# Patient Record
Sex: FEMALE | Race: ASIAN
[De-identification: names, ages, dates, MRNs, and addresses within clinical notes are randomized per-mention and may not be internally consistent; named-entity substitution may affect disease eponyms.]

---

## 2017-05-03 ENCOUNTER — HOSPITAL ENCOUNTER (EMERGENCY)
Dept: HOSPITAL 8 - ED | Age: 47
Discharge: HOME | End: 2017-05-03
Payer: MEDICAID

## 2017-05-03 VITALS — BODY MASS INDEX: 24.01 KG/M2 | HEIGHT: 71 IN | WEIGHT: 171.52 LBS

## 2017-05-03 VITALS — SYSTOLIC BLOOD PRESSURE: 139 MMHG | DIASTOLIC BLOOD PRESSURE: 93 MMHG

## 2017-05-03 DIAGNOSIS — E03.9: ICD-10-CM

## 2017-05-03 DIAGNOSIS — E78.00: ICD-10-CM

## 2017-05-03 DIAGNOSIS — J45.909: ICD-10-CM

## 2017-05-03 DIAGNOSIS — M79.601: Primary | ICD-10-CM

## 2017-05-03 DIAGNOSIS — G89.11: ICD-10-CM

## 2017-05-03 DIAGNOSIS — E11.9: ICD-10-CM

## 2017-05-03 DIAGNOSIS — I10: ICD-10-CM

## 2017-05-03 PROCEDURE — 96372 THER/PROPH/DIAG INJ SC/IM: CPT

## 2017-05-03 PROCEDURE — 99284 EMERGENCY DEPT VISIT MOD MDM: CPT

## 2017-05-03 PROCEDURE — 73060 X-RAY EXAM OF HUMERUS: CPT

## 2017-05-03 PROCEDURE — 73030 X-RAY EXAM OF SHOULDER: CPT

## 2017-09-05 ENCOUNTER — HOSPITAL ENCOUNTER (EMERGENCY)
Dept: HOSPITAL 8 - ED | Age: 47
Discharge: LEFT BEFORE DISCHARGE | End: 2017-09-05
Payer: MEDICAID

## 2017-09-05 DIAGNOSIS — R51: Primary | ICD-10-CM

## 2017-09-05 DIAGNOSIS — R04.0: ICD-10-CM

## 2017-09-05 DIAGNOSIS — Z53.21: ICD-10-CM

## 2017-10-13 ENCOUNTER — HOSPITAL ENCOUNTER (EMERGENCY)
Dept: HOSPITAL 8 - ED | Age: 47
Discharge: HOME | End: 2017-10-13
Payer: MEDICAID

## 2017-10-13 VITALS — HEIGHT: 61 IN | BODY MASS INDEX: 31.3 KG/M2 | WEIGHT: 165.79 LBS

## 2017-10-13 VITALS — SYSTOLIC BLOOD PRESSURE: 162 MMHG | DIASTOLIC BLOOD PRESSURE: 113 MMHG

## 2017-10-13 DIAGNOSIS — M25.551: ICD-10-CM

## 2017-10-13 DIAGNOSIS — M25.572: ICD-10-CM

## 2017-10-13 DIAGNOSIS — M25.552: ICD-10-CM
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
## 2017-10-13 DIAGNOSIS — I10: ICD-10-CM

## 2017-10-13 DIAGNOSIS — M25.562: ICD-10-CM

## 2017-10-13 DIAGNOSIS — S39.012A: Primary | ICD-10-CM

## 2017-10-13 DIAGNOSIS — E03.9: ICD-10-CM

## 2017-10-13 DIAGNOSIS — E78.00: ICD-10-CM

## 2017-10-13 DIAGNOSIS — M25.571: ICD-10-CM
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
## 2017-10-13 DIAGNOSIS — M25.561: ICD-10-CM
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
## 2017-10-13 DIAGNOSIS — E11.9: ICD-10-CM

## 2017-10-13 LAB
BUN SERPL-MCNC: 16 MG/DL (ref 7–18)
HCT VFR BLD CALC: 44.3 % (ref 34.6–47.8)
HGB BLD-MCNC: 15.1 G/DL (ref 11.7–16.4)
WBC # BLD AUTO: 7.1 X10^3/UL (ref 3.4–10)

## 2017-10-13 PROCEDURE — 82040 ASSAY OF SERUM ALBUMIN: CPT

## 2017-10-13 PROCEDURE — 80048 BASIC METABOLIC PNL TOTAL CA: CPT

## 2017-10-13 PROCEDURE — 96372 THER/PROPH/DIAG INJ SC/IM: CPT

## 2017-10-13 PROCEDURE — 85025 COMPLETE CBC W/AUTO DIFF WBC: CPT

## 2017-10-13 PROCEDURE — 99284 EMERGENCY DEPT VISIT MOD MDM: CPT

## 2017-10-13 PROCEDURE — 36415 COLL VENOUS BLD VENIPUNCTURE: CPT

## 2018-01-09 ENCOUNTER — HOSPITAL ENCOUNTER (EMERGENCY)
Dept: HOSPITAL 8 - ED | Age: 48
Discharge: HOME | End: 2018-01-09
Payer: MEDICAID

## 2018-01-09 VITALS — WEIGHT: 163.34 LBS | BODY MASS INDEX: 30.84 KG/M2 | HEIGHT: 61 IN

## 2018-01-09 VITALS — DIASTOLIC BLOOD PRESSURE: 101 MMHG | SYSTOLIC BLOOD PRESSURE: 143 MMHG

## 2018-01-09 DIAGNOSIS — E03.9: ICD-10-CM

## 2018-01-09 DIAGNOSIS — I10: ICD-10-CM

## 2018-01-09 DIAGNOSIS — E11.65: ICD-10-CM

## 2018-01-09 DIAGNOSIS — R07.89: Primary | ICD-10-CM

## 2018-01-09 DIAGNOSIS — J45.909: ICD-10-CM

## 2018-01-09 DIAGNOSIS — E87.6: ICD-10-CM

## 2018-01-09 DIAGNOSIS — E78.00: ICD-10-CM

## 2018-01-09 LAB
ALBUMIN SERPL-MCNC: 3.6 G/DL (ref 3.4–5)
ANION GAP SERPL CALC-SCNC: 7 MMOL/L (ref 5–15)
BASOPHILS # BLD AUTO: 0.05 X10^3/UL (ref 0–0.1)
BASOPHILS NFR BLD AUTO: 1 % (ref 0–1)
CALCIUM SERPL-MCNC: 8.5 MG/DL (ref 8.5–10.1)
CHLORIDE SERPL-SCNC: 106 MMOL/L (ref 98–107)
CREAT SERPL-MCNC: 0.9 MG/DL (ref 0.55–1.02)
EOSINOPHIL # BLD AUTO: 0.21 X10^3/UL (ref 0–0.4)
EOSINOPHIL NFR BLD AUTO: 3 % (ref 1–7)
ERYTHROCYTE [DISTWIDTH] IN BLOOD BY AUTOMATED COUNT: 12.9 % (ref 9.6–15.2)
LYMPHOCYTES # BLD AUTO: 2.55 X10^3/UL (ref 1–3.4)
LYMPHOCYTES NFR BLD AUTO: 39 % (ref 22–44)
MCH RBC QN AUTO: 30.2 PG (ref 27–34.8)
MCHC RBC AUTO-ENTMCNC: 33.7 G/DL (ref 32.4–35.8)
MCV RBC AUTO: 89.6 FL (ref 80–100)
MD: NO
MONOCYTES # BLD AUTO: 0.51 X10^3/UL (ref 0.2–0.8)
MONOCYTES NFR BLD AUTO: 8 % (ref 2–9)
NEUTROPHILS # BLD AUTO: 3.25 X10^3/UL (ref 1.8–6.8)
NEUTROPHILS NFR BLD AUTO: 50 % (ref 42–75)
PLATELET # BLD AUTO: 314 X10^3/UL (ref 130–400)
PMV BLD AUTO: 7.2 FL (ref 7.4–10.4)
RBC # BLD AUTO: 4.92 X10^6/UL (ref 3.82–5.3)
TROPONIN I SERPL-MCNC: < 0.015 NG/ML (ref 0–0.04)

## 2018-01-09 PROCEDURE — 71045 X-RAY EXAM CHEST 1 VIEW: CPT

## 2018-01-09 PROCEDURE — 84484 ASSAY OF TROPONIN QUANT: CPT

## 2018-01-09 PROCEDURE — 85025 COMPLETE CBC W/AUTO DIFF WBC: CPT

## 2018-01-09 PROCEDURE — 82040 ASSAY OF SERUM ALBUMIN: CPT

## 2018-01-09 PROCEDURE — 93005 ELECTROCARDIOGRAM TRACING: CPT

## 2018-01-09 PROCEDURE — 99285 EMERGENCY DEPT VISIT HI MDM: CPT

## 2018-01-09 PROCEDURE — 36415 COLL VENOUS BLD VENIPUNCTURE: CPT

## 2018-01-09 PROCEDURE — 80048 BASIC METABOLIC PNL TOTAL CA: CPT

## 2018-04-21 ENCOUNTER — HOSPITAL ENCOUNTER (EMERGENCY)
Dept: HOSPITAL 8 - ED | Age: 48
LOS: 1 days | Discharge: HOME | End: 2018-04-22
Payer: MEDICAID

## 2018-04-21 VITALS — WEIGHT: 165.35 LBS | HEIGHT: 62 IN | BODY MASS INDEX: 30.43 KG/M2

## 2018-04-21 DIAGNOSIS — J45.909: ICD-10-CM

## 2018-04-21 DIAGNOSIS — E11.9: ICD-10-CM

## 2018-04-21 DIAGNOSIS — E03.9: ICD-10-CM

## 2018-04-21 DIAGNOSIS — N30.90: Primary | ICD-10-CM

## 2018-04-21 DIAGNOSIS — I10: ICD-10-CM

## 2018-04-21 DIAGNOSIS — E78.00: ICD-10-CM

## 2018-04-21 PROCEDURE — 85025 COMPLETE CBC W/AUTO DIFF WBC: CPT

## 2018-04-21 PROCEDURE — 99285 EMERGENCY DEPT VISIT HI MDM: CPT

## 2018-04-21 PROCEDURE — 96375 TX/PRO/DX INJ NEW DRUG ADDON: CPT

## 2018-04-21 PROCEDURE — 36415 COLL VENOUS BLD VENIPUNCTURE: CPT

## 2018-04-21 PROCEDURE — 96365 THER/PROPH/DIAG IV INF INIT: CPT

## 2018-04-21 PROCEDURE — 80053 COMPREHEN METABOLIC PANEL: CPT

## 2018-04-21 PROCEDURE — 96372 THER/PROPH/DIAG INJ SC/IM: CPT

## 2018-04-21 PROCEDURE — 83690 ASSAY OF LIPASE: CPT

## 2018-04-21 PROCEDURE — 84703 CHORIONIC GONADOTROPIN ASSAY: CPT

## 2018-04-21 PROCEDURE — 74177 CT ABD & PELVIS W/CONTRAST: CPT

## 2018-04-21 PROCEDURE — 81001 URINALYSIS AUTO W/SCOPE: CPT

## 2018-04-21 PROCEDURE — 87086 URINE CULTURE/COLONY COUNT: CPT

## 2018-04-22 VITALS — DIASTOLIC BLOOD PRESSURE: 90 MMHG | SYSTOLIC BLOOD PRESSURE: 141 MMHG

## 2018-04-22 LAB
ALBUMIN SERPL-MCNC: 3.8 G/DL (ref 3.4–5)
ALP SERPL-CCNC: 95 U/L (ref 45–117)
ALT SERPL-CCNC: 23 U/L (ref 12–78)
ANION GAP SERPL CALC-SCNC: 8 MMOL/L (ref 5–15)
BASOPHILS # BLD AUTO: 0.07 X10^3/UL (ref 0–0.1)
BASOPHILS NFR BLD AUTO: 1 % (ref 0–1)
BILIRUB SERPL-MCNC: 0.4 MG/DL (ref 0.2–1)
CALCIUM SERPL-MCNC: 8.6 MG/DL (ref 8.5–10.1)
CHLORIDE SERPL-SCNC: 109 MMOL/L (ref 98–107)
CREAT SERPL-MCNC: 0.88 MG/DL (ref 0.55–1.02)
CULTURE INDICATED?: YES
EOSINOPHIL # BLD AUTO: 0.15 X10^3/UL (ref 0–0.4)
EOSINOPHIL NFR BLD AUTO: 1 % (ref 1–7)
ERYTHROCYTE [DISTWIDTH] IN BLOOD BY AUTOMATED COUNT: 13 % (ref 9.6–15.2)
LYMPHOCYTES # BLD AUTO: 3.19 X10^3/UL (ref 1–3.4)
LYMPHOCYTES NFR BLD AUTO: 23 % (ref 22–44)
MCH RBC QN AUTO: 30 PG (ref 27–34.8)
MCHC RBC AUTO-ENTMCNC: 33.9 G/DL (ref 32.4–35.8)
MCV RBC AUTO: 88.4 FL (ref 80–100)
MD: NO
MICROSCOPIC: (no result)
MONOCYTES # BLD AUTO: 1.21 X10^3/UL (ref 0.2–0.8)
MONOCYTES NFR BLD AUTO: 9 % (ref 2–9)
NEUTROPHILS # BLD AUTO: 9.36 X10^3/UL (ref 1.8–6.8)
NEUTROPHILS NFR BLD AUTO: 67 % (ref 42–75)
PLATELET # BLD AUTO: 383 X10^3/UL (ref 130–400)
PMV BLD AUTO: 7.3 FL (ref 7.4–10.4)
PROT SERPL-MCNC: 8.2 G/DL (ref 6.4–8.2)
RBC # BLD AUTO: 4.93 X10^6/UL (ref 3.82–5.3)

## 2018-06-21 ENCOUNTER — HOSPITAL ENCOUNTER (EMERGENCY)
Dept: HOSPITAL 8 - ED | Age: 48
Discharge: LEFT BEFORE DISCHARGE | End: 2018-06-21
Payer: MEDICAID

## 2018-06-21 ENCOUNTER — HOSPITAL ENCOUNTER (EMERGENCY)
Dept: HOSPITAL 8 - ED | Age: 48
LOS: 1 days | Discharge: HOME | End: 2018-06-22
Payer: MEDICAID

## 2018-06-21 VITALS — HEIGHT: 61 IN | WEIGHT: 176.15 LBS | BODY MASS INDEX: 33.26 KG/M2

## 2018-06-21 VITALS — WEIGHT: 175.71 LBS | HEIGHT: 61 IN | BODY MASS INDEX: 33.17 KG/M2

## 2018-06-21 VITALS — DIASTOLIC BLOOD PRESSURE: 116 MMHG | SYSTOLIC BLOOD PRESSURE: 183 MMHG

## 2018-06-21 DIAGNOSIS — E03.9: ICD-10-CM

## 2018-06-21 DIAGNOSIS — M79.661: Primary | ICD-10-CM

## 2018-06-21 DIAGNOSIS — E11.65: ICD-10-CM

## 2018-06-21 DIAGNOSIS — L01.01: ICD-10-CM

## 2018-06-21 DIAGNOSIS — I10: ICD-10-CM

## 2018-06-21 DIAGNOSIS — J45.909: ICD-10-CM

## 2018-06-21 DIAGNOSIS — M79.651: ICD-10-CM

## 2018-06-21 DIAGNOSIS — Z53.21: ICD-10-CM

## 2018-06-21 DIAGNOSIS — F17.200: ICD-10-CM

## 2018-06-21 DIAGNOSIS — M25.571: Primary | ICD-10-CM

## 2018-06-21 PROCEDURE — 99284 EMERGENCY DEPT VISIT MOD MDM: CPT

## 2018-06-22 VITALS — DIASTOLIC BLOOD PRESSURE: 77 MMHG | SYSTOLIC BLOOD PRESSURE: 132 MMHG

## 2018-11-04 ENCOUNTER — HOSPITAL ENCOUNTER (EMERGENCY)
Dept: HOSPITAL 8 - ED | Age: 48
Discharge: HOME | End: 2018-11-04
Payer: MEDICAID

## 2018-11-04 VITALS — WEIGHT: 154.32 LBS | HEIGHT: 61 IN | BODY MASS INDEX: 29.14 KG/M2

## 2018-11-04 VITALS — SYSTOLIC BLOOD PRESSURE: 121 MMHG | DIASTOLIC BLOOD PRESSURE: 94 MMHG

## 2018-11-04 DIAGNOSIS — I10: ICD-10-CM

## 2018-11-04 DIAGNOSIS — E03.9: ICD-10-CM

## 2018-11-04 DIAGNOSIS — J45.901: Primary | ICD-10-CM

## 2018-11-04 DIAGNOSIS — F17.200: ICD-10-CM

## 2018-11-04 DIAGNOSIS — E11.9: ICD-10-CM

## 2018-11-04 DIAGNOSIS — E78.00: ICD-10-CM

## 2018-11-04 LAB
ALBUMIN SERPL-MCNC: 3 G/DL (ref 3.4–5)
ANION GAP SERPL CALC-SCNC: 9 MMOL/L (ref 5–15)
BASOPHILS # BLD AUTO: 0.05 X10^3/UL (ref 0–0.1)
BASOPHILS NFR BLD AUTO: 1 % (ref 0–1)
CALCIUM SERPL-MCNC: 8.2 MG/DL (ref 8.5–10.1)
CHLORIDE SERPL-SCNC: 109 MMOL/L (ref 98–107)
CREAT SERPL-MCNC: 0.84 MG/DL (ref 0.55–1.02)
EOSINOPHIL # BLD AUTO: 0.16 X10^3/UL (ref 0–0.4)
EOSINOPHIL NFR BLD AUTO: 2 % (ref 1–7)
ERYTHROCYTE [DISTWIDTH] IN BLOOD BY AUTOMATED COUNT: 12.4 % (ref 9.6–15.2)
LYMPHOCYTES # BLD AUTO: 3.38 X10^3/UL (ref 1–3.4)
LYMPHOCYTES NFR BLD AUTO: 37 % (ref 22–44)
MCH RBC QN AUTO: 29.9 PG (ref 27–34.8)
MCHC RBC AUTO-ENTMCNC: 33.3 G/DL (ref 32.4–35.8)
MCV RBC AUTO: 89.7 FL (ref 80–100)
MD: NO
MONOCYTES # BLD AUTO: 0.75 X10^3/UL (ref 0.2–0.8)
MONOCYTES NFR BLD AUTO: 8 % (ref 2–9)
NEUTROPHILS # BLD AUTO: 4.74 X10^3/UL (ref 1.8–6.8)
NEUTROPHILS NFR BLD AUTO: 52 % (ref 42–75)
PLATELET # BLD AUTO: 359 X10^3/UL (ref 130–400)
PMV BLD AUTO: 7 FL (ref 7.4–10.4)
RBC # BLD AUTO: 4.59 X10^6/UL (ref 3.82–5.3)
TROPONIN I SERPL-MCNC: 0.01 NG/ML (ref 0–0.04)

## 2018-11-04 PROCEDURE — 36415 COLL VENOUS BLD VENIPUNCTURE: CPT

## 2018-11-04 PROCEDURE — 94640 AIRWAY INHALATION TREATMENT: CPT

## 2018-11-04 PROCEDURE — 82040 ASSAY OF SERUM ALBUMIN: CPT

## 2018-11-04 PROCEDURE — 99285 EMERGENCY DEPT VISIT HI MDM: CPT

## 2018-11-04 PROCEDURE — 93005 ELECTROCARDIOGRAM TRACING: CPT

## 2018-11-04 PROCEDURE — 80048 BASIC METABOLIC PNL TOTAL CA: CPT

## 2018-11-04 PROCEDURE — 84484 ASSAY OF TROPONIN QUANT: CPT

## 2018-11-04 PROCEDURE — 96374 THER/PROPH/DIAG INJ IV PUSH: CPT

## 2018-11-04 PROCEDURE — 71045 X-RAY EXAM CHEST 1 VIEW: CPT

## 2018-11-04 PROCEDURE — 85025 COMPLETE CBC W/AUTO DIFF WBC: CPT

## 2019-03-25 ENCOUNTER — HOSPITAL ENCOUNTER (EMERGENCY)
Dept: HOSPITAL 8 - ED | Age: 49
Discharge: HOME | End: 2019-03-25
Payer: MEDICAID

## 2019-03-25 VITALS — SYSTOLIC BLOOD PRESSURE: 165 MMHG | DIASTOLIC BLOOD PRESSURE: 111 MMHG

## 2019-03-25 VITALS — HEIGHT: 61 IN | WEIGHT: 175.27 LBS | BODY MASS INDEX: 33.09 KG/M2

## 2019-03-25 DIAGNOSIS — L03.115: ICD-10-CM

## 2019-03-25 DIAGNOSIS — G89.11: Primary | ICD-10-CM

## 2019-03-25 DIAGNOSIS — M25.512: ICD-10-CM
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
## 2019-03-25 DIAGNOSIS — E11.9: ICD-10-CM
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
## 2019-03-25 DIAGNOSIS — Z76.0: ICD-10-CM

## 2019-03-25 DIAGNOSIS — L03.116: ICD-10-CM

## 2019-03-25 DIAGNOSIS — I10: ICD-10-CM

## 2019-03-25 PROCEDURE — 82962 GLUCOSE BLOOD TEST: CPT

## 2019-03-25 PROCEDURE — 99284 EMERGENCY DEPT VISIT MOD MDM: CPT

## 2019-04-19 ENCOUNTER — HOSPITAL ENCOUNTER (EMERGENCY)
Dept: HOSPITAL 8 - ED | Age: 49
Discharge: HOME | End: 2019-04-19
Payer: MEDICAID

## 2019-04-19 VITALS — SYSTOLIC BLOOD PRESSURE: 181 MMHG | DIASTOLIC BLOOD PRESSURE: 101 MMHG

## 2019-04-19 VITALS — WEIGHT: 174.17 LBS | HEIGHT: 61 IN | BODY MASS INDEX: 32.88 KG/M2

## 2019-04-19 DIAGNOSIS — E03.9: ICD-10-CM

## 2019-04-19 DIAGNOSIS — F17.200: ICD-10-CM

## 2019-04-19 DIAGNOSIS — L40.0: Primary | ICD-10-CM

## 2019-04-19 DIAGNOSIS — E78.00: ICD-10-CM

## 2019-04-19 DIAGNOSIS — E11.9: ICD-10-CM

## 2019-04-19 DIAGNOSIS — I10: ICD-10-CM

## 2019-04-19 PROCEDURE — 99283 EMERGENCY DEPT VISIT LOW MDM: CPT

## 2019-05-21 ENCOUNTER — HOSPITAL ENCOUNTER (EMERGENCY)
Dept: HOSPITAL 8 - ED | Age: 49
Discharge: HOME | End: 2019-05-21
Payer: MEDICAID

## 2019-05-21 VITALS — BODY MASS INDEX: 32.97 KG/M2 | HEIGHT: 61 IN | WEIGHT: 174.61 LBS

## 2019-05-21 VITALS — SYSTOLIC BLOOD PRESSURE: 167 MMHG | DIASTOLIC BLOOD PRESSURE: 112 MMHG

## 2019-05-21 DIAGNOSIS — I10: ICD-10-CM

## 2019-05-21 DIAGNOSIS — E03.9: ICD-10-CM

## 2019-05-21 DIAGNOSIS — E78.00: ICD-10-CM

## 2019-05-21 DIAGNOSIS — B02.9: Primary | ICD-10-CM

## 2019-05-21 DIAGNOSIS — F17.200: ICD-10-CM

## 2019-05-21 DIAGNOSIS — E11.9: ICD-10-CM

## 2019-05-21 DIAGNOSIS — E78.5: ICD-10-CM

## 2019-05-21 PROCEDURE — 99283 EMERGENCY DEPT VISIT LOW MDM: CPT

## 2019-10-22 ENCOUNTER — HOSPITAL ENCOUNTER (EMERGENCY)
Dept: HOSPITAL 8 - ED | Age: 49
Discharge: HOME | End: 2019-10-22
Payer: MEDICAID

## 2019-10-22 VITALS — BODY MASS INDEX: 33.09 KG/M2 | HEIGHT: 61 IN | WEIGHT: 175.27 LBS

## 2019-10-22 VITALS — SYSTOLIC BLOOD PRESSURE: 184 MMHG | DIASTOLIC BLOOD PRESSURE: 113 MMHG

## 2019-10-22 DIAGNOSIS — E78.00: ICD-10-CM

## 2019-10-22 DIAGNOSIS — S00.83XA: ICD-10-CM

## 2019-10-22 DIAGNOSIS — E11.9: ICD-10-CM
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
## 2019-10-22 DIAGNOSIS — J45.909: ICD-10-CM
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
## 2019-10-22 DIAGNOSIS — E03.9: ICD-10-CM
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
## 2019-10-22 DIAGNOSIS — S00.11XA: Primary | ICD-10-CM

## 2019-10-22 PROCEDURE — 70486 CT MAXILLOFACIAL W/O DYE: CPT

## 2019-10-22 PROCEDURE — 70450 CT HEAD/BRAIN W/O DYE: CPT

## 2019-10-22 PROCEDURE — 99284 EMERGENCY DEPT VISIT MOD MDM: CPT

## 2019-12-27 ENCOUNTER — HOSPITAL ENCOUNTER (EMERGENCY)
Dept: HOSPITAL 8 - ED | Age: 49
Discharge: HOME | End: 2019-12-27
Payer: MEDICAID

## 2019-12-27 VITALS — DIASTOLIC BLOOD PRESSURE: 119 MMHG | SYSTOLIC BLOOD PRESSURE: 175 MMHG

## 2019-12-27 VITALS — WEIGHT: 171.96 LBS | HEIGHT: 61 IN | BODY MASS INDEX: 32.47 KG/M2

## 2019-12-27 DIAGNOSIS — E78.00: ICD-10-CM

## 2019-12-27 DIAGNOSIS — E11.9: ICD-10-CM

## 2019-12-27 DIAGNOSIS — J45.41: Primary | ICD-10-CM

## 2019-12-27 DIAGNOSIS — I10: ICD-10-CM

## 2019-12-27 DIAGNOSIS — E03.9: ICD-10-CM

## 2019-12-27 DIAGNOSIS — F17.200: ICD-10-CM

## 2019-12-27 LAB
ALBUMIN SERPL-MCNC: 3.6 G/DL (ref 3.4–5)
ALP SERPL-CCNC: 90 U/L (ref 45–117)
ALT SERPL-CCNC: 24 U/L (ref 12–78)
ANION GAP SERPL CALC-SCNC: 8 MMOL/L (ref 5–15)
BASOPHILS # BLD AUTO: 0.04 X10^3/UL (ref 0–0.1)
BASOPHILS NFR BLD AUTO: 1 % (ref 0–1)
BILIRUB SERPL-MCNC: 0.2 MG/DL (ref 0.2–1)
CALCIUM SERPL-MCNC: 8.6 MG/DL (ref 8.5–10.1)
CHLORIDE SERPL-SCNC: 106 MMOL/L (ref 98–107)
CREAT SERPL-MCNC: 0.86 MG/DL (ref 0.55–1.02)
EOSINOPHIL # BLD AUTO: 0.16 X10^3/UL (ref 0–0.4)
EOSINOPHIL NFR BLD AUTO: 2 % (ref 1–7)
ERYTHROCYTE [DISTWIDTH] IN BLOOD BY AUTOMATED COUNT: 12.7 % (ref 9.6–15.2)
LYMPHOCYTES # BLD AUTO: 2.39 X10^3/UL (ref 1–3.4)
LYMPHOCYTES NFR BLD AUTO: 33 % (ref 22–44)
MCH RBC QN AUTO: 30.5 PG (ref 27–34.8)
MCHC RBC AUTO-ENTMCNC: 32.8 G/DL (ref 32.4–35.8)
MCV RBC AUTO: 92.8 FL (ref 80–100)
MD: NO
MONOCYTES # BLD AUTO: 0.51 X10^3/UL (ref 0.2–0.8)
MONOCYTES NFR BLD AUTO: 7 % (ref 2–9)
NEUTROPHILS # BLD AUTO: 4.1 X10^3/UL (ref 1.8–6.8)
NEUTROPHILS NFR BLD AUTO: 57 % (ref 42–75)
PLATELET # BLD AUTO: 360 X10^3/UL (ref 130–400)
PMV BLD AUTO: 7.5 FL (ref 7.4–10.4)
PROT SERPL-MCNC: 7.6 G/DL (ref 6.4–8.2)
RBC # BLD AUTO: 5.05 X10^6/UL (ref 3.82–5.3)
TROPONIN I SERPL-MCNC: 0.02 NG/ML (ref 0–0.04)

## 2019-12-27 PROCEDURE — 36415 COLL VENOUS BLD VENIPUNCTURE: CPT

## 2019-12-27 PROCEDURE — 80053 COMPREHEN METABOLIC PANEL: CPT

## 2019-12-27 PROCEDURE — 99284 EMERGENCY DEPT VISIT MOD MDM: CPT

## 2019-12-27 PROCEDURE — 84484 ASSAY OF TROPONIN QUANT: CPT

## 2019-12-27 PROCEDURE — 83880 ASSAY OF NATRIURETIC PEPTIDE: CPT

## 2019-12-27 PROCEDURE — 71045 X-RAY EXAM CHEST 1 VIEW: CPT

## 2019-12-27 PROCEDURE — 96374 THER/PROPH/DIAG INJ IV PUSH: CPT

## 2019-12-27 PROCEDURE — 85025 COMPLETE CBC W/AUTO DIFF WBC: CPT

## 2019-12-27 PROCEDURE — 94640 AIRWAY INHALATION TREATMENT: CPT

## 2019-12-27 PROCEDURE — 96375 TX/PRO/DX INJ NEW DRUG ADDON: CPT

## 2019-12-27 PROCEDURE — 93005 ELECTROCARDIOGRAM TRACING: CPT

## 2020-01-06 ENCOUNTER — HOSPITAL ENCOUNTER (EMERGENCY)
Dept: HOSPITAL 8 - ED | Age: 50
Discharge: HOME | End: 2020-01-06
Payer: MEDICAID

## 2020-01-06 VITALS — SYSTOLIC BLOOD PRESSURE: 148 MMHG | DIASTOLIC BLOOD PRESSURE: 105 MMHG

## 2020-01-06 VITALS — WEIGHT: 170.86 LBS | HEIGHT: 61.5 IN | BODY MASS INDEX: 31.85 KG/M2

## 2020-01-06 DIAGNOSIS — E11.9: ICD-10-CM

## 2020-01-06 DIAGNOSIS — I10: ICD-10-CM

## 2020-01-06 DIAGNOSIS — Z76.0: ICD-10-CM
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
## 2020-01-06 DIAGNOSIS — F17.210: ICD-10-CM
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
## 2020-01-06 DIAGNOSIS — S01.111A: Primary | ICD-10-CM

## 2020-01-06 PROCEDURE — 99284 EMERGENCY DEPT VISIT MOD MDM: CPT

## 2020-01-06 PROCEDURE — 12051 INTMD RPR FACE/MM 2.5 CM/<: CPT

## 2020-02-10 ENCOUNTER — HOSPITAL ENCOUNTER (EMERGENCY)
Dept: HOSPITAL 8 - ED | Age: 50
Discharge: HOME | End: 2020-02-10
Payer: MEDICAID

## 2020-02-10 VITALS — BODY MASS INDEX: 34.09 KG/M2 | WEIGHT: 180.56 LBS | HEIGHT: 61 IN

## 2020-02-10 VITALS — DIASTOLIC BLOOD PRESSURE: 113 MMHG | SYSTOLIC BLOOD PRESSURE: 169 MMHG

## 2020-02-10 DIAGNOSIS — Z72.9: ICD-10-CM

## 2020-02-10 DIAGNOSIS — L03.116: ICD-10-CM

## 2020-02-10 DIAGNOSIS — E11.9: ICD-10-CM

## 2020-02-10 DIAGNOSIS — L03.115: Primary | ICD-10-CM

## 2020-02-10 DIAGNOSIS — F17.200: ICD-10-CM

## 2020-02-10 DIAGNOSIS — L40.0: ICD-10-CM

## 2020-02-10 DIAGNOSIS — I10: ICD-10-CM

## 2020-02-10 PROCEDURE — 90715 TDAP VACCINE 7 YRS/> IM: CPT

## 2020-02-10 PROCEDURE — 90471 IMMUNIZATION ADMIN: CPT

## 2020-02-10 PROCEDURE — 99283 EMERGENCY DEPT VISIT LOW MDM: CPT

## 2020-09-10 ENCOUNTER — HOSPITAL ENCOUNTER (INPATIENT)
Dept: HOSPITAL 8 - ED | Age: 50
Discharge: LEFT BEFORE DISCHARGE | DRG: 280 | End: 2020-09-10
Attending: STUDENT IN AN ORGANIZED HEALTH CARE EDUCATION/TRAINING PROGRAM | Admitting: INTERNAL MEDICINE
Payer: MEDICAID

## 2020-09-10 VITALS — SYSTOLIC BLOOD PRESSURE: 148 MMHG | DIASTOLIC BLOOD PRESSURE: 101 MMHG

## 2020-09-10 VITALS — DIASTOLIC BLOOD PRESSURE: 109 MMHG | SYSTOLIC BLOOD PRESSURE: 159 MMHG

## 2020-09-10 VITALS — SYSTOLIC BLOOD PRESSURE: 138 MMHG | DIASTOLIC BLOOD PRESSURE: 90 MMHG

## 2020-09-10 VITALS — HEIGHT: 61 IN | BODY MASS INDEX: 34.59 KG/M2 | WEIGHT: 183.2 LBS

## 2020-09-10 DIAGNOSIS — G93.41: ICD-10-CM

## 2020-09-10 DIAGNOSIS — K22.4: ICD-10-CM

## 2020-09-10 DIAGNOSIS — F15.10: ICD-10-CM

## 2020-09-10 DIAGNOSIS — J45.909: ICD-10-CM

## 2020-09-10 DIAGNOSIS — F10.239: ICD-10-CM

## 2020-09-10 DIAGNOSIS — F41.9: ICD-10-CM

## 2020-09-10 DIAGNOSIS — I26.99: ICD-10-CM

## 2020-09-10 DIAGNOSIS — E03.9: ICD-10-CM

## 2020-09-10 DIAGNOSIS — E87.6: ICD-10-CM

## 2020-09-10 DIAGNOSIS — K21.0: ICD-10-CM

## 2020-09-10 DIAGNOSIS — Z79.4: ICD-10-CM

## 2020-09-10 DIAGNOSIS — I20.0: ICD-10-CM

## 2020-09-10 DIAGNOSIS — R07.89: ICD-10-CM

## 2020-09-10 DIAGNOSIS — E11.40: ICD-10-CM

## 2020-09-10 DIAGNOSIS — F17.200: ICD-10-CM

## 2020-09-10 DIAGNOSIS — I21.9: Primary | ICD-10-CM

## 2020-09-10 DIAGNOSIS — I10: ICD-10-CM

## 2020-09-10 DIAGNOSIS — R00.0: ICD-10-CM

## 2020-09-10 DIAGNOSIS — Z63.8: ICD-10-CM

## 2020-09-10 LAB
ALBUMIN SERPL-MCNC: 3.8 G/DL (ref 3.4–5)
ALP SERPL-CCNC: 91 U/L (ref 45–117)
ALT SERPL-CCNC: 23 U/L (ref 12–78)
ANION GAP SERPL CALC-SCNC: 8 MMOL/L (ref 5–15)
ANION GAP SERPL CALC-SCNC: 8 MMOL/L (ref 5–15)
BASOPHILS # BLD AUTO: 0.06 X10^3/UL (ref 0–0.1)
BASOPHILS NFR BLD AUTO: 1 % (ref 0–1)
BILIRUB SERPL-MCNC: 0.7 MG/DL (ref 0.2–1)
CALCIUM SERPL-MCNC: 7.9 MG/DL (ref 8.5–10.1)
CALCIUM SERPL-MCNC: 8.4 MG/DL (ref 8.5–10.1)
CHLORIDE SERPL-SCNC: 104 MMOL/L (ref 98–107)
CHLORIDE SERPL-SCNC: 109 MMOL/L (ref 98–107)
CHOL/HDL RATIO: 5.7
CREAT SERPL-MCNC: 0.74 MG/DL (ref 0.55–1.02)
CREAT SERPL-MCNC: 1.09 MG/DL (ref 0.55–1.02)
EOSINOPHIL # BLD AUTO: 0.14 X10^3/UL (ref 0–0.4)
EOSINOPHIL NFR BLD AUTO: 2 % (ref 1–7)
ERYTHROCYTE [DISTWIDTH] IN BLOOD BY AUTOMATED COUNT: 13.1 % (ref 9.6–15.2)
HDL CHOL %: 18 % (ref 28–40)
HDL CHOLESTEROL (DIRECT): 36 MG/DL (ref 40–60)
LDL CHOLESTEROL,CALCULATED: 128 MG/DL (ref 54–169)
LDLC/HDLC SERPL: 3.6 {RATIO} (ref 0.5–3)
LYMPHOCYTES # BLD AUTO: 3.53 X10^3/UL (ref 1–3.4)
LYMPHOCYTES NFR BLD AUTO: 38 % (ref 22–44)
MCH RBC QN AUTO: 29.5 PG (ref 27–34.8)
MCHC RBC AUTO-ENTMCNC: 32.7 G/DL (ref 32.4–35.8)
MCV RBC AUTO: 90.2 FL (ref 80–100)
MD: NO
MONOCYTES # BLD AUTO: 0.68 X10^3/UL (ref 0.2–0.8)
MONOCYTES NFR BLD AUTO: 7 % (ref 2–9)
NEUTROPHILS # BLD AUTO: 4.84 X10^3/UL (ref 1.8–6.8)
NEUTROPHILS NFR BLD AUTO: 52 % (ref 42–75)
PLATELET # BLD AUTO: 319 X10^3/UL (ref 130–400)
PMV BLD AUTO: 7.4 FL (ref 7.4–10.4)
PROT SERPL-MCNC: 7.9 G/DL (ref 6.4–8.2)
RBC # BLD AUTO: 5.15 X10^6/UL (ref 3.82–5.3)
TRIGL SERPL-MCNC: 203 MG/DL (ref 50–200)
TROPONIN I SERPL-MCNC: 0.06 NG/ML (ref 0–0.04)
TROPONIN I SERPL-MCNC: 0.07 NG/ML (ref 0–0.04)
TROPONIN I SERPL-MCNC: 0.07 NG/ML (ref 0–0.04)
TROPONIN I SERPL-MCNC: 0.08 NG/ML (ref 0–0.04)
VLDLC SERPL CALC-MCNC: 41 MG/DL (ref 0–25)

## 2020-09-10 PROCEDURE — 80307 DRUG TEST PRSMV CHEM ANLYZR: CPT

## 2020-09-10 PROCEDURE — 36415 COLL VENOUS BLD VENIPUNCTURE: CPT

## 2020-09-10 PROCEDURE — 83735 ASSAY OF MAGNESIUM: CPT

## 2020-09-10 PROCEDURE — 85379 FIBRIN DEGRADATION QUANT: CPT

## 2020-09-10 PROCEDURE — 80048 BASIC METABOLIC PNL TOTAL CA: CPT

## 2020-09-10 PROCEDURE — 82962 GLUCOSE BLOOD TEST: CPT

## 2020-09-10 PROCEDURE — 85025 COMPLETE CBC W/AUTO DIFF WBC: CPT

## 2020-09-10 PROCEDURE — 71045 X-RAY EXAM CHEST 1 VIEW: CPT

## 2020-09-10 PROCEDURE — 80061 LIPID PANEL: CPT

## 2020-09-10 PROCEDURE — 93005 ELECTROCARDIOGRAM TRACING: CPT

## 2020-09-10 PROCEDURE — 84484 ASSAY OF TROPONIN QUANT: CPT

## 2020-09-10 PROCEDURE — 80053 COMPREHEN METABOLIC PANEL: CPT

## 2020-09-10 RX ADMIN — ASPIRIN SCH MG: 81 TABLET, COATED ORAL at 08:58

## 2020-09-10 RX ADMIN — INSULIN LISPRO SCH UNITS: 100 INJECTION, SOLUTION INTRAVENOUS; SUBCUTANEOUS at 11:00

## 2020-09-10 RX ADMIN — INSULIN LISPRO SCH UNITS: 100 INJECTION, SOLUTION INTRAVENOUS; SUBCUTANEOUS at 07:00

## 2020-09-10 RX ADMIN — SODIUM CHLORIDE SCH MLS/HR: 0.9 INJECTION, SOLUTION INTRAVENOUS at 05:04

## 2020-09-10 RX ADMIN — SODIUM CHLORIDE SCH MLS/HR: 0.9 INJECTION, SOLUTION INTRAVENOUS at 16:40

## 2020-09-10 RX ADMIN — ASPIRIN SCH MG: 81 TABLET, COATED ORAL at 08:55

## 2020-09-10 SDOH — SOCIAL STABILITY - SOCIAL INSECURITY: OTHER SPECIFIED PROBLEMS RELATED TO PRIMARY SUPPORT GROUP: Z63.8

## 2020-09-11 ENCOUNTER — HOSPITAL ENCOUNTER (EMERGENCY)
Dept: HOSPITAL 8 - ED | Age: 50
Discharge: LEFT BEFORE DISCHARGE | End: 2020-09-11
Payer: MEDICAID

## 2020-09-11 DIAGNOSIS — R06.02: Primary | ICD-10-CM

## 2020-09-11 DIAGNOSIS — Z53.21: ICD-10-CM

## 2021-04-15 ENCOUNTER — HOSPITAL ENCOUNTER (INPATIENT)
Dept: HOSPITAL 8 - ED | Age: 51
LOS: 5 days | Discharge: LEFT BEFORE DISCHARGE | DRG: 193 | End: 2021-04-20
Attending: STUDENT IN AN ORGANIZED HEALTH CARE EDUCATION/TRAINING PROGRAM | Admitting: INTERNAL MEDICINE
Payer: MEDICAID

## 2021-04-15 VITALS — HEIGHT: 61 IN | WEIGHT: 181.44 LBS | BODY MASS INDEX: 34.26 KG/M2

## 2021-04-15 VITALS — DIASTOLIC BLOOD PRESSURE: 113 MMHG | SYSTOLIC BLOOD PRESSURE: 166 MMHG

## 2021-04-15 DIAGNOSIS — J44.0: ICD-10-CM

## 2021-04-15 DIAGNOSIS — I25.2: ICD-10-CM

## 2021-04-15 DIAGNOSIS — J44.1: ICD-10-CM

## 2021-04-15 DIAGNOSIS — E78.5: ICD-10-CM

## 2021-04-15 DIAGNOSIS — D86.9: ICD-10-CM

## 2021-04-15 DIAGNOSIS — J18.9: Primary | ICD-10-CM

## 2021-04-15 DIAGNOSIS — J96.01: ICD-10-CM

## 2021-04-15 DIAGNOSIS — E03.9: ICD-10-CM

## 2021-04-15 DIAGNOSIS — I25.10: ICD-10-CM

## 2021-04-15 DIAGNOSIS — I50.22: ICD-10-CM

## 2021-04-15 DIAGNOSIS — Z20.822: ICD-10-CM

## 2021-04-15 DIAGNOSIS — E11.65: ICD-10-CM

## 2021-04-15 DIAGNOSIS — I11.0: ICD-10-CM

## 2021-04-15 DIAGNOSIS — F15.90: ICD-10-CM

## 2021-04-15 DIAGNOSIS — Z91.14: ICD-10-CM

## 2021-04-15 DIAGNOSIS — K80.20: ICD-10-CM

## 2021-04-15 DIAGNOSIS — Z91.19: ICD-10-CM

## 2021-04-15 LAB
ALBUMIN SERPL-MCNC: 3.5 G/DL (ref 3.4–5)
ALP SERPL-CCNC: 107 U/L (ref 45–117)
ALT SERPL-CCNC: 108 U/L (ref 12–78)
ANION GAP SERPL CALC-SCNC: 5 MMOL/L (ref 5–15)
BASOPHILS # BLD AUTO: 0.1 X10^3/UL (ref 0–0.1)
BASOPHILS NFR BLD AUTO: 1 % (ref 0–1)
BILIRUB SERPL-MCNC: 0.7 MG/DL (ref 0.2–1)
CALCIUM SERPL-MCNC: 8.8 MG/DL (ref 8.5–10.1)
CHLORIDE SERPL-SCNC: 101 MMOL/L (ref 98–107)
CREAT SERPL-MCNC: 1.11 MG/DL (ref 0.55–1.02)
EOSINOPHIL # BLD AUTO: 0 X10^3/UL (ref 0–0.4)
EOSINOPHIL NFR BLD AUTO: 0 % (ref 1–7)
ERYTHROCYTE [DISTWIDTH] IN BLOOD BY AUTOMATED COUNT: 13.8 % (ref 9.6–15.2)
EST. AVERAGE GLUCOSE BLD GHB EST-MCNC: 249 MG/DL (ref 0–126)
LYMPHOCYTES # BLD AUTO: 2.2 X10^3/UL (ref 1–3.4)
LYMPHOCYTES NFR BLD AUTO: 21 % (ref 22–44)
MCH RBC QN AUTO: 30.6 PG (ref 27–34.8)
MCHC RBC AUTO-ENTMCNC: 33.7 G/DL (ref 32.4–35.8)
MD: NO
MICROSCOPIC: (no result)
MONOCYTES # BLD AUTO: 0.7 X10^3/UL (ref 0.2–0.8)
MONOCYTES NFR BLD AUTO: 7 % (ref 2–9)
NEUTROPHILS # BLD AUTO: 7.5 X10^3/UL (ref 1.8–6.8)
NEUTROPHILS NFR BLD AUTO: 72 % (ref 42–75)
PLATELET # BLD AUTO: 330 X10^3/UL (ref 130–400)
PMV BLD AUTO: 7.5 FL (ref 7.4–10.4)
PROT SERPL-MCNC: 7.8 G/DL (ref 6.4–8.2)
RBC # BLD AUTO: 5.02 X10^6/UL (ref 3.82–5.3)
TROPONIN I SERPL-MCNC: 0.06 NG/ML (ref 0–0.04)
TROPONIN I SERPL-MCNC: 0.08 NG/ML (ref 0–0.04)

## 2021-04-15 PROCEDURE — 81001 URINALYSIS AUTO W/SCOPE: CPT

## 2021-04-15 PROCEDURE — 80074 ACUTE HEPATITIS PANEL: CPT

## 2021-04-15 PROCEDURE — 76700 US EXAM ABDOM COMPLETE: CPT

## 2021-04-15 PROCEDURE — 80053 COMPREHEN METABOLIC PANEL: CPT

## 2021-04-15 PROCEDURE — 96365 THER/PROPH/DIAG IV INF INIT: CPT

## 2021-04-15 PROCEDURE — 80048 BASIC METABOLIC PNL TOTAL CA: CPT

## 2021-04-15 PROCEDURE — 83036 HEMOGLOBIN GLYCOSYLATED A1C: CPT

## 2021-04-15 PROCEDURE — 93005 ELECTROCARDIOGRAM TRACING: CPT

## 2021-04-15 PROCEDURE — 84100 ASSAY OF PHOSPHORUS: CPT

## 2021-04-15 PROCEDURE — 71045 X-RAY EXAM CHEST 1 VIEW: CPT

## 2021-04-15 PROCEDURE — 96375 TX/PRO/DX INJ NEW DRUG ADDON: CPT

## 2021-04-15 PROCEDURE — 84703 CHORIONIC GONADOTROPIN ASSAY: CPT

## 2021-04-15 PROCEDURE — U0003 INFECTIOUS AGENT DETECTION BY NUCLEIC ACID (DNA OR RNA); SEVERE ACUTE RESPIRATORY SYNDROME CORONAVIRUS 2 (SARS-COV-2) (CORONAVIRUS DISEASE [COVID-19]), AMPLIFIED PROBE TECHNIQUE, MAKING USE OF HIGH THROUGHPUT TECHNOLOGIES AS DESCRIBED BY CMS-2020-01-R: HCPCS

## 2021-04-15 PROCEDURE — 83605 ASSAY OF LACTIC ACID: CPT

## 2021-04-15 PROCEDURE — 84145 PROCALCITONIN (PCT): CPT

## 2021-04-15 PROCEDURE — 94640 AIRWAY INHALATION TREATMENT: CPT

## 2021-04-15 PROCEDURE — 87040 BLOOD CULTURE FOR BACTERIA: CPT

## 2021-04-15 PROCEDURE — 83880 ASSAY OF NATRIURETIC PEPTIDE: CPT

## 2021-04-15 PROCEDURE — 99285 EMERGENCY DEPT VISIT HI MDM: CPT

## 2021-04-15 PROCEDURE — 87806 HIV AG W/HIV1&2 ANTB W/OPTIC: CPT

## 2021-04-15 PROCEDURE — 85379 FIBRIN DEGRADATION QUANT: CPT

## 2021-04-15 PROCEDURE — 82962 GLUCOSE BLOOD TEST: CPT

## 2021-04-15 PROCEDURE — 71275 CT ANGIOGRAPHY CHEST: CPT

## 2021-04-15 PROCEDURE — 93306 TTE W/DOPPLER COMPLETE: CPT

## 2021-04-15 PROCEDURE — 85025 COMPLETE CBC W/AUTO DIFF WBC: CPT

## 2021-04-15 PROCEDURE — 82947 ASSAY GLUCOSE BLOOD QUANT: CPT

## 2021-04-15 PROCEDURE — 84484 ASSAY OF TROPONIN QUANT: CPT

## 2021-04-15 PROCEDURE — 83735 ASSAY OF MAGNESIUM: CPT

## 2021-04-15 PROCEDURE — 84443 ASSAY THYROID STIM HORMONE: CPT

## 2021-04-15 PROCEDURE — 36415 COLL VENOUS BLD VENIPUNCTURE: CPT

## 2021-04-15 PROCEDURE — G0475 HIV COMBINATION ASSAY: HCPCS

## 2021-04-15 PROCEDURE — 80307 DRUG TEST PRSMV CHEM ANLYZR: CPT

## 2021-04-15 RX ADMIN — LINEZOLID SCH MG: 600 TABLET, FILM COATED ORAL at 18:23

## 2021-04-15 RX ADMIN — Medication SCH MG: at 21:10

## 2021-04-15 RX ADMIN — ENOXAPARIN SODIUM SCH MG: 40 INJECTION SUBCUTANEOUS at 18:23

## 2021-04-15 RX ADMIN — INSULIN LISPRO SCH UNITS: 100 INJECTION, SOLUTION INTRAVENOUS; SUBCUTANEOUS at 21:09

## 2021-04-15 RX ADMIN — HYDRALAZINE HYDROCHLORIDE PRN MG: 20 INJECTION INTRAMUSCULAR; INTRAVENOUS at 18:42

## 2021-04-15 RX ADMIN — ATORVASTATIN CALCIUM SCH MG: 40 TABLET, FILM COATED ORAL at 21:10

## 2021-04-15 RX ADMIN — TAZOBACTAM SODIUM AND PIPERACILLIN SODIUM SCH MLS/HR: 375; 3 INJECTION, SOLUTION INTRAVENOUS at 21:10

## 2021-04-16 VITALS — SYSTOLIC BLOOD PRESSURE: 159 MMHG | DIASTOLIC BLOOD PRESSURE: 93 MMHG

## 2021-04-16 VITALS — DIASTOLIC BLOOD PRESSURE: 99 MMHG | SYSTOLIC BLOOD PRESSURE: 144 MMHG

## 2021-04-16 VITALS — DIASTOLIC BLOOD PRESSURE: 96 MMHG | SYSTOLIC BLOOD PRESSURE: 139 MMHG

## 2021-04-16 VITALS — DIASTOLIC BLOOD PRESSURE: 82 MMHG | SYSTOLIC BLOOD PRESSURE: 142 MMHG

## 2021-04-16 VITALS — DIASTOLIC BLOOD PRESSURE: 90 MMHG | SYSTOLIC BLOOD PRESSURE: 149 MMHG

## 2021-04-16 LAB
ANION GAP SERPL CALC-SCNC: 4 MMOL/L (ref 5–15)
BASOPHILS # BLD AUTO: 0 X10^3/UL (ref 0–0.1)
BASOPHILS NFR BLD AUTO: 0 % (ref 0–1)
CALCIUM SERPL-MCNC: 8.6 MG/DL (ref 8.5–10.1)
CHLORIDE SERPL-SCNC: 100 MMOL/L (ref 98–107)
CREAT SERPL-MCNC: 0.98 MG/DL (ref 0.55–1.02)
EOSINOPHIL # BLD AUTO: 0 X10^3/UL (ref 0–0.4)
EOSINOPHIL NFR BLD AUTO: 0 % (ref 1–7)
ERYTHROCYTE [DISTWIDTH] IN BLOOD BY AUTOMATED COUNT: 13.7 % (ref 9.6–15.2)
LYMPHOCYTES # BLD AUTO: 1.7 X10^3/UL (ref 1–3.4)
LYMPHOCYTES NFR BLD AUTO: 14 % (ref 22–44)
MCH RBC QN AUTO: 30.2 PG (ref 27–34.8)
MCHC RBC AUTO-ENTMCNC: 33.7 G/DL (ref 32.4–35.8)
MD: NO
MONOCYTES # BLD AUTO: 0.6 X10^3/UL (ref 0.2–0.8)
MONOCYTES NFR BLD AUTO: 5 % (ref 2–9)
NEUTROPHILS # BLD AUTO: 9.7 X10^3/UL (ref 1.8–6.8)
NEUTROPHILS NFR BLD AUTO: 81 % (ref 42–75)
PLATELET # BLD AUTO: 334 X10^3/UL (ref 130–400)
PMV BLD AUTO: 8 FL (ref 7.4–10.4)
RBC # BLD AUTO: 4.82 X10^6/UL (ref 3.82–5.3)
TROPONIN I SERPL-MCNC: 0.05 NG/ML (ref 0–0.04)

## 2021-04-16 RX ADMIN — TAZOBACTAM SODIUM AND PIPERACILLIN SODIUM SCH MLS/HR: 375; 3 INJECTION, SOLUTION INTRAVENOUS at 22:03

## 2021-04-16 RX ADMIN — INSULIN LISPRO SCH UNITS: 100 INJECTION, SOLUTION INTRAVENOUS; SUBCUTANEOUS at 11:24

## 2021-04-16 RX ADMIN — ATORVASTATIN CALCIUM SCH MG: 40 TABLET, FILM COATED ORAL at 20:12

## 2021-04-16 RX ADMIN — ASPIRIN 81 MG SCH MG: 81 TABLET ORAL at 05:09

## 2021-04-16 RX ADMIN — INSULIN LISPRO SCH UNITS: 100 INJECTION, SOLUTION INTRAVENOUS; SUBCUTANEOUS at 20:11

## 2021-04-16 RX ADMIN — TAZOBACTAM SODIUM AND PIPERACILLIN SODIUM SCH MLS/HR: 375; 3 INJECTION, SOLUTION INTRAVENOUS at 16:30

## 2021-04-16 RX ADMIN — Medication SCH MG: at 20:13

## 2021-04-16 RX ADMIN — INSULIN LISPRO SCH UNITS: 100 INJECTION, SOLUTION INTRAVENOUS; SUBCUTANEOUS at 09:07

## 2021-04-16 RX ADMIN — LINEZOLID SCH MG: 600 TABLET, FILM COATED ORAL at 05:09

## 2021-04-16 RX ADMIN — TAZOBACTAM SODIUM AND PIPERACILLIN SODIUM SCH MLS/HR: 375; 3 INJECTION, SOLUTION INTRAVENOUS at 02:56

## 2021-04-16 RX ADMIN — Medication SCH MG: at 09:07

## 2021-04-16 RX ADMIN — TAZOBACTAM SODIUM AND PIPERACILLIN SODIUM SCH MLS/HR: 375; 3 INJECTION, SOLUTION INTRAVENOUS at 09:46

## 2021-04-16 RX ADMIN — ENOXAPARIN SODIUM SCH MG: 40 INJECTION SUBCUTANEOUS at 20:13

## 2021-04-16 RX ADMIN — INSULIN LISPRO SCH UNITS: 100 INJECTION, SOLUTION INTRAVENOUS; SUBCUTANEOUS at 17:22

## 2021-04-16 RX ADMIN — LINEZOLID SCH MG: 600 TABLET, FILM COATED ORAL at 17:22

## 2021-04-16 RX ADMIN — FUROSEMIDE SCH MG: 10 INJECTION, SOLUTION INTRAVENOUS at 18:32

## 2021-04-17 VITALS — DIASTOLIC BLOOD PRESSURE: 110 MMHG | SYSTOLIC BLOOD PRESSURE: 161 MMHG

## 2021-04-17 VITALS — SYSTOLIC BLOOD PRESSURE: 187 MMHG | DIASTOLIC BLOOD PRESSURE: 103 MMHG

## 2021-04-17 VITALS — SYSTOLIC BLOOD PRESSURE: 160 MMHG | DIASTOLIC BLOOD PRESSURE: 100 MMHG

## 2021-04-17 VITALS — DIASTOLIC BLOOD PRESSURE: 109 MMHG | SYSTOLIC BLOOD PRESSURE: 170 MMHG

## 2021-04-17 VITALS — SYSTOLIC BLOOD PRESSURE: 147 MMHG | DIASTOLIC BLOOD PRESSURE: 92 MMHG

## 2021-04-17 VITALS — DIASTOLIC BLOOD PRESSURE: 109 MMHG | SYSTOLIC BLOOD PRESSURE: 158 MMHG

## 2021-04-17 LAB
ALBUMIN SERPL-MCNC: 3.2 G/DL (ref 3.4–5)
ALP SERPL-CCNC: 92 U/L (ref 45–117)
ALT SERPL-CCNC: 108 U/L (ref 12–78)
ANION GAP SERPL CALC-SCNC: 4 MMOL/L (ref 5–15)
BASOPHILS # BLD AUTO: 0.1 X10^3/UL (ref 0–0.1)
BASOPHILS NFR BLD AUTO: 1 % (ref 0–1)
BILIRUB SERPL-MCNC: 0.3 MG/DL (ref 0.2–1)
CALCIUM SERPL-MCNC: 8.8 MG/DL (ref 8.5–10.1)
CHLORIDE SERPL-SCNC: 99 MMOL/L (ref 98–107)
CREAT SERPL-MCNC: 0.91 MG/DL (ref 0.55–1.02)
EOSINOPHIL # BLD AUTO: 0 X10^3/UL (ref 0–0.4)
EOSINOPHIL NFR BLD AUTO: 0 % (ref 1–7)
ERYTHROCYTE [DISTWIDTH] IN BLOOD BY AUTOMATED COUNT: 13.5 % (ref 9.6–15.2)
LYMPHOCYTES # BLD AUTO: 2.4 X10^3/UL (ref 1–3.4)
LYMPHOCYTES NFR BLD AUTO: 18 % (ref 22–44)
MCH RBC QN AUTO: 30.4 PG (ref 27–34.8)
MCHC RBC AUTO-ENTMCNC: 33.1 G/DL (ref 32.4–35.8)
MD: NO
MONOCYTES # BLD AUTO: 0.9 X10^3/UL (ref 0.2–0.8)
MONOCYTES NFR BLD AUTO: 7 % (ref 2–9)
NEUTROPHILS # BLD AUTO: 9.7 X10^3/UL (ref 1.8–6.8)
NEUTROPHILS NFR BLD AUTO: 74 % (ref 42–75)
PLATELET # BLD AUTO: 312 X10^3/UL (ref 130–400)
PMV BLD AUTO: 7.8 FL (ref 7.4–10.4)
PROT SERPL-MCNC: 7.2 G/DL (ref 6.4–8.2)
RBC # BLD AUTO: 4.59 X10^6/UL (ref 3.82–5.3)

## 2021-04-17 RX ADMIN — LINEZOLID SCH MG: 600 TABLET, FILM COATED ORAL at 16:33

## 2021-04-17 RX ADMIN — INSULIN LISPRO SCH UNITS: 100 INJECTION, SOLUTION INTRAVENOUS; SUBCUTANEOUS at 16:33

## 2021-04-17 RX ADMIN — TAZOBACTAM SODIUM AND PIPERACILLIN SODIUM SCH MLS/HR: 375; 3 INJECTION, SOLUTION INTRAVENOUS at 04:45

## 2021-04-17 RX ADMIN — TAZOBACTAM SODIUM AND PIPERACILLIN SODIUM SCH MLS/HR: 375; 3 INJECTION, SOLUTION INTRAVENOUS at 11:14

## 2021-04-17 RX ADMIN — Medication SCH MG: at 08:29

## 2021-04-17 RX ADMIN — ATORVASTATIN CALCIUM SCH MG: 40 TABLET, FILM COATED ORAL at 21:13

## 2021-04-17 RX ADMIN — METOPROLOL TARTRATE SCH MG: 25 TABLET, FILM COATED ORAL at 16:33

## 2021-04-17 RX ADMIN — INSULIN GLARGINE SCH UNITS: 100 INJECTION, SOLUTION SUBCUTANEOUS at 21:14

## 2021-04-17 RX ADMIN — INSULIN GLARGINE SCH UNITS: 100 INJECTION, SOLUTION SUBCUTANEOUS at 08:33

## 2021-04-17 RX ADMIN — TAZOBACTAM SODIUM AND PIPERACILLIN SODIUM SCH MLS/HR: 375; 3 INJECTION, SOLUTION INTRAVENOUS at 23:01

## 2021-04-17 RX ADMIN — TAZOBACTAM SODIUM AND PIPERACILLIN SODIUM SCH MLS/HR: 375; 3 INJECTION, SOLUTION INTRAVENOUS at 16:33

## 2021-04-17 RX ADMIN — LINEZOLID SCH MG: 600 TABLET, FILM COATED ORAL at 05:16

## 2021-04-17 RX ADMIN — METOPROLOL TARTRATE SCH MG: 25 TABLET, FILM COATED ORAL at 09:23

## 2021-04-17 RX ADMIN — ASPIRIN 81 MG SCH MG: 81 TABLET ORAL at 05:16

## 2021-04-17 RX ADMIN — INSULIN GLARGINE SCH UNITS: 100 INJECTION, SOLUTION SUBCUTANEOUS at 09:00

## 2021-04-17 RX ADMIN — INSULIN LISPRO SCH UNITS: 100 INJECTION, SOLUTION INTRAVENOUS; SUBCUTANEOUS at 21:14

## 2021-04-17 RX ADMIN — ENOXAPARIN SODIUM SCH MG: 40 INJECTION SUBCUTANEOUS at 21:13

## 2021-04-17 RX ADMIN — Medication SCH MG: at 21:13

## 2021-04-17 RX ADMIN — HYDRALAZINE HYDROCHLORIDE PRN MG: 20 INJECTION INTRAMUSCULAR; INTRAVENOUS at 21:13

## 2021-04-17 RX ADMIN — FUROSEMIDE SCH MG: 10 INJECTION, SOLUTION INTRAVENOUS at 08:30

## 2021-04-17 RX ADMIN — INSULIN LISPRO SCH UNITS: 100 INJECTION, SOLUTION INTRAVENOUS; SUBCUTANEOUS at 08:32

## 2021-04-17 RX ADMIN — INSULIN LISPRO SCH UNITS: 100 INJECTION, SOLUTION INTRAVENOUS; SUBCUTANEOUS at 11:48

## 2021-04-17 RX ADMIN — LISINOPRIL SCH MG: 10 TABLET ORAL at 09:23

## 2021-04-18 VITALS — SYSTOLIC BLOOD PRESSURE: 158 MMHG | DIASTOLIC BLOOD PRESSURE: 112 MMHG

## 2021-04-18 VITALS — SYSTOLIC BLOOD PRESSURE: 150 MMHG | DIASTOLIC BLOOD PRESSURE: 69 MMHG

## 2021-04-18 VITALS — SYSTOLIC BLOOD PRESSURE: 123 MMHG | DIASTOLIC BLOOD PRESSURE: 82 MMHG

## 2021-04-18 VITALS — DIASTOLIC BLOOD PRESSURE: 80 MMHG | SYSTOLIC BLOOD PRESSURE: 127 MMHG

## 2021-04-18 VITALS — SYSTOLIC BLOOD PRESSURE: 143 MMHG | DIASTOLIC BLOOD PRESSURE: 90 MMHG

## 2021-04-18 LAB
ANION GAP SERPL CALC-SCNC: 4 MMOL/L (ref 5–15)
CALCIUM SERPL-MCNC: 8.4 MG/DL (ref 8.5–10.1)
CHLORIDE SERPL-SCNC: 99 MMOL/L (ref 98–107)
CREAT SERPL-MCNC: 0.91 MG/DL (ref 0.55–1.02)

## 2021-04-18 RX ADMIN — DOXYCYCLINE SCH MLS/HR: 100 INJECTION, POWDER, LYOPHILIZED, FOR SOLUTION INTRAVENOUS at 22:14

## 2021-04-18 RX ADMIN — LINEZOLID SCH MG: 600 TABLET, FILM COATED ORAL at 05:08

## 2021-04-18 RX ADMIN — INSULIN LISPRO SCH UNITS: 100 INJECTION, SOLUTION INTRAVENOUS; SUBCUTANEOUS at 21:26

## 2021-04-18 RX ADMIN — TAZOBACTAM SODIUM AND PIPERACILLIN SODIUM SCH MLS/HR: 375; 3 INJECTION, SOLUTION INTRAVENOUS at 17:09

## 2021-04-18 RX ADMIN — INSULIN LISPRO SCH UNITS: 100 INJECTION, SOLUTION INTRAVENOUS; SUBCUTANEOUS at 10:05

## 2021-04-18 RX ADMIN — FUROSEMIDE SCH MG: 10 INJECTION, SOLUTION INTRAVENOUS at 22:08

## 2021-04-18 RX ADMIN — LINEZOLID SCH MG: 600 TABLET, FILM COATED ORAL at 17:08

## 2021-04-18 RX ADMIN — LISINOPRIL SCH MG: 10 TABLET ORAL at 09:59

## 2021-04-18 RX ADMIN — INSULIN GLARGINE SCH UNITS: 100 INJECTION, SOLUTION SUBCUTANEOUS at 10:06

## 2021-04-18 RX ADMIN — CARVEDILOL SCH MG: 3.12 TABLET, FILM COATED ORAL at 17:08

## 2021-04-18 RX ADMIN — TAZOBACTAM SODIUM AND PIPERACILLIN SODIUM SCH MLS/HR: 375; 3 INJECTION, SOLUTION INTRAVENOUS at 12:27

## 2021-04-18 RX ADMIN — INSULIN LISPRO SCH UNITS: 100 INJECTION, SOLUTION INTRAVENOUS; SUBCUTANEOUS at 17:09

## 2021-04-18 RX ADMIN — ASPIRIN 81 MG SCH MG: 81 TABLET ORAL at 05:08

## 2021-04-18 RX ADMIN — METOPROLOL TARTRATE SCH MG: 25 TABLET, FILM COATED ORAL at 05:08

## 2021-04-18 RX ADMIN — TAZOBACTAM SODIUM AND PIPERACILLIN SODIUM SCH MLS/HR: 375; 3 INJECTION, SOLUTION INTRAVENOUS at 23:38

## 2021-04-18 RX ADMIN — Medication SCH MG: at 09:59

## 2021-04-18 RX ADMIN — ENOXAPARIN SODIUM SCH MG: 40 INJECTION SUBCUTANEOUS at 21:13

## 2021-04-18 RX ADMIN — Medication SCH MG: at 21:13

## 2021-04-18 RX ADMIN — INSULIN LISPRO SCH UNITS: 100 INJECTION, SOLUTION INTRAVENOUS; SUBCUTANEOUS at 12:26

## 2021-04-18 RX ADMIN — ATORVASTATIN CALCIUM SCH MG: 40 TABLET, FILM COATED ORAL at 21:14

## 2021-04-18 RX ADMIN — TAZOBACTAM SODIUM AND PIPERACILLIN SODIUM SCH MLS/HR: 375; 3 INJECTION, SOLUTION INTRAVENOUS at 05:08

## 2021-04-18 RX ADMIN — INSULIN GLARGINE SCH UNITS: 100 INJECTION, SOLUTION SUBCUTANEOUS at 21:27

## 2021-04-18 RX ADMIN — FUROSEMIDE SCH MG: 10 INJECTION, SOLUTION INTRAVENOUS at 10:00

## 2021-04-19 VITALS — SYSTOLIC BLOOD PRESSURE: 145 MMHG | DIASTOLIC BLOOD PRESSURE: 87 MMHG

## 2021-04-19 VITALS — DIASTOLIC BLOOD PRESSURE: 94 MMHG | SYSTOLIC BLOOD PRESSURE: 143 MMHG

## 2021-04-19 VITALS — DIASTOLIC BLOOD PRESSURE: 90 MMHG | SYSTOLIC BLOOD PRESSURE: 150 MMHG

## 2021-04-19 VITALS — DIASTOLIC BLOOD PRESSURE: 97 MMHG | SYSTOLIC BLOOD PRESSURE: 158 MMHG

## 2021-04-19 LAB
ALBUMIN SERPL-MCNC: 3.3 G/DL (ref 3.4–5)
ALP SERPL-CCNC: 98 U/L (ref 45–117)
ALT SERPL-CCNC: 132 U/L (ref 12–78)
ANION GAP SERPL CALC-SCNC: 6 MMOL/L (ref 5–15)
BASOPHILS # BLD AUTO: 0 X10^3/UL (ref 0–0.1)
BASOPHILS NFR BLD AUTO: 0 % (ref 0–1)
BILIRUB SERPL-MCNC: 0.6 MG/DL (ref 0.2–1)
CALCIUM SERPL-MCNC: 9.3 MG/DL (ref 8.5–10.1)
CHLORIDE SERPL-SCNC: 96 MMOL/L (ref 98–107)
CREAT SERPL-MCNC: 0.86 MG/DL (ref 0.55–1.02)
EOSINOPHIL # BLD AUTO: 0 X10^3/UL (ref 0–0.4)
EOSINOPHIL NFR BLD AUTO: 0 % (ref 1–7)
ERYTHROCYTE [DISTWIDTH] IN BLOOD BY AUTOMATED COUNT: 13.5 % (ref 9.6–15.2)
LYMPHOCYTES # BLD AUTO: 2.8 X10^3/UL (ref 1–3.4)
LYMPHOCYTES NFR BLD AUTO: 27 % (ref 22–44)
MCH RBC QN AUTO: 30.6 PG (ref 27–34.8)
MCHC RBC AUTO-ENTMCNC: 34.3 G/DL (ref 32.4–35.8)
MD: NO
MONOCYTES # BLD AUTO: 1 X10^3/UL (ref 0.2–0.8)
MONOCYTES NFR BLD AUTO: 9 % (ref 2–9)
NEUTROPHILS # BLD AUTO: 6.6 X10^3/UL (ref 1.8–6.8)
NEUTROPHILS NFR BLD AUTO: 63 % (ref 42–75)
PLATELET # BLD AUTO: 328 X10^3/UL (ref 130–400)
PMV BLD AUTO: 7.6 FL (ref 7.4–10.4)
PROT SERPL-MCNC: 7.3 G/DL (ref 6.4–8.2)
RBC # BLD AUTO: 5.34 X10^6/UL (ref 3.82–5.3)

## 2021-04-19 RX ADMIN — TAZOBACTAM SODIUM AND PIPERACILLIN SODIUM SCH MLS/HR: 375; 3 INJECTION, SOLUTION INTRAVENOUS at 12:13

## 2021-04-19 RX ADMIN — TAZOBACTAM SODIUM AND PIPERACILLIN SODIUM SCH MLS/HR: 375; 3 INJECTION, SOLUTION INTRAVENOUS at 05:20

## 2021-04-19 RX ADMIN — INSULIN GLARGINE SCH UNITS: 100 INJECTION, SOLUTION SUBCUTANEOUS at 20:16

## 2021-04-19 RX ADMIN — Medication SCH MG: at 20:14

## 2021-04-19 RX ADMIN — CARVEDILOL SCH MG: 3.12 TABLET, FILM COATED ORAL at 05:16

## 2021-04-19 RX ADMIN — LISINOPRIL SCH MG: 10 TABLET ORAL at 09:10

## 2021-04-19 RX ADMIN — LINEZOLID SCH MG: 600 TABLET, FILM COATED ORAL at 18:05

## 2021-04-19 RX ADMIN — Medication SCH MG: at 09:10

## 2021-04-19 RX ADMIN — TAZOBACTAM SODIUM AND PIPERACILLIN SODIUM SCH MLS/HR: 375; 3 INJECTION, SOLUTION INTRAVENOUS at 16:52

## 2021-04-19 RX ADMIN — ASPIRIN 81 MG SCH MG: 81 TABLET ORAL at 05:16

## 2021-04-19 RX ADMIN — ATORVASTATIN CALCIUM SCH MG: 40 TABLET, FILM COATED ORAL at 20:14

## 2021-04-19 RX ADMIN — INSULIN LISPRO SCH UNITS: 100 INJECTION, SOLUTION INTRAVENOUS; SUBCUTANEOUS at 16:51

## 2021-04-19 RX ADMIN — DOXYCYCLINE SCH MLS/HR: 100 INJECTION, POWDER, LYOPHILIZED, FOR SOLUTION INTRAVENOUS at 09:04

## 2021-04-19 RX ADMIN — CARVEDILOL SCH MG: 3.12 TABLET, FILM COATED ORAL at 18:05

## 2021-04-19 RX ADMIN — FUROSEMIDE SCH MG: 10 INJECTION, SOLUTION INTRAVENOUS at 09:11

## 2021-04-19 RX ADMIN — DOXYCYCLINE SCH MLS/HR: 100 INJECTION, POWDER, LYOPHILIZED, FOR SOLUTION INTRAVENOUS at 20:15

## 2021-04-19 RX ADMIN — LINEZOLID SCH MG: 600 TABLET, FILM COATED ORAL at 05:16

## 2021-04-19 RX ADMIN — INSULIN LISPRO SCH UNITS: 100 INJECTION, SOLUTION INTRAVENOUS; SUBCUTANEOUS at 20:15

## 2021-04-19 RX ADMIN — INSULIN LISPRO SCH UNITS: 100 INJECTION, SOLUTION INTRAVENOUS; SUBCUTANEOUS at 07:00

## 2021-04-19 RX ADMIN — INSULIN LISPRO SCH UNITS: 100 INJECTION, SOLUTION INTRAVENOUS; SUBCUTANEOUS at 11:20

## 2021-04-19 RX ADMIN — INSULIN GLARGINE SCH UNITS: 100 INJECTION, SOLUTION SUBCUTANEOUS at 09:08

## 2021-04-19 RX ADMIN — TAZOBACTAM SODIUM AND PIPERACILLIN SODIUM SCH MLS/HR: 375; 3 INJECTION, SOLUTION INTRAVENOUS at 23:11

## 2021-04-19 RX ADMIN — ENOXAPARIN SODIUM SCH MG: 40 INJECTION SUBCUTANEOUS at 20:14

## 2021-04-20 VITALS — DIASTOLIC BLOOD PRESSURE: 107 MMHG | SYSTOLIC BLOOD PRESSURE: 150 MMHG

## 2021-04-20 VITALS — SYSTOLIC BLOOD PRESSURE: 132 MMHG | DIASTOLIC BLOOD PRESSURE: 90 MMHG

## 2021-04-20 VITALS — DIASTOLIC BLOOD PRESSURE: 97 MMHG | SYSTOLIC BLOOD PRESSURE: 144 MMHG

## 2021-04-20 LAB — TROPONIN I SERPL-MCNC: 0.04 NG/ML (ref 0–0.04)

## 2021-04-20 RX ADMIN — Medication SCH MG: at 08:59

## 2021-04-20 RX ADMIN — TAZOBACTAM SODIUM AND PIPERACILLIN SODIUM SCH MLS/HR: 375; 3 INJECTION, SOLUTION INTRAVENOUS at 17:06

## 2021-04-20 RX ADMIN — ASPIRIN 81 MG SCH MG: 81 TABLET ORAL at 05:12

## 2021-04-20 RX ADMIN — INSULIN GLARGINE SCH UNITS: 100 INJECTION, SOLUTION SUBCUTANEOUS at 08:58

## 2021-04-20 RX ADMIN — LINEZOLID SCH MG: 600 TABLET, FILM COATED ORAL at 17:46

## 2021-04-20 RX ADMIN — INSULIN LISPRO SCH UNITS: 100 INJECTION, SOLUTION INTRAVENOUS; SUBCUTANEOUS at 09:04

## 2021-04-20 RX ADMIN — CARVEDILOL SCH MG: 3.12 TABLET, FILM COATED ORAL at 05:12

## 2021-04-20 RX ADMIN — LINEZOLID SCH MG: 600 TABLET, FILM COATED ORAL at 05:12

## 2021-04-20 RX ADMIN — TAZOBACTAM SODIUM AND PIPERACILLIN SODIUM SCH MLS/HR: 375; 3 INJECTION, SOLUTION INTRAVENOUS at 11:06

## 2021-04-20 RX ADMIN — DOXYCYCLINE SCH MLS/HR: 100 INJECTION, POWDER, LYOPHILIZED, FOR SOLUTION INTRAVENOUS at 08:57

## 2021-04-20 RX ADMIN — LISINOPRIL SCH MG: 10 TABLET ORAL at 08:59

## 2021-04-20 RX ADMIN — TAZOBACTAM SODIUM AND PIPERACILLIN SODIUM SCH MLS/HR: 375; 3 INJECTION, SOLUTION INTRAVENOUS at 05:12

## 2021-04-20 RX ADMIN — CARVEDILOL SCH MG: 3.12 TABLET, FILM COATED ORAL at 17:46

## 2021-04-20 RX ADMIN — INSULIN LISPRO SCH UNITS: 100 INJECTION, SOLUTION INTRAVENOUS; SUBCUTANEOUS at 11:09

## 2021-07-08 ENCOUNTER — HOSPITAL ENCOUNTER (EMERGENCY)
Age: 51
Discharge: HOME | End: 2021-07-08
Attending: EMERGENCY MEDICINE
Payer: MEDICAID

## 2021-07-08 ENCOUNTER — APPOINTMENT (OUTPATIENT)
Dept: GENERAL RADIOLOGY | Age: 51
End: 2021-07-08
Payer: MEDICAID

## 2021-07-08 VITALS
HEART RATE: 114 BPM | OXYGEN SATURATION: 97 % | WEIGHT: 168 LBS | HEIGHT: 62 IN | TEMPERATURE: 98.7 F | BODY MASS INDEX: 30.91 KG/M2 | DIASTOLIC BLOOD PRESSURE: 120 MMHG | SYSTOLIC BLOOD PRESSURE: 157 MMHG | RESPIRATION RATE: 24 BRPM

## 2021-07-08 DIAGNOSIS — R79.89 ELEVATED BRAIN NATRIURETIC PEPTIDE (BNP) LEVEL: ICD-10-CM

## 2021-07-08 DIAGNOSIS — F15.10 METHAMPHETAMINE USE (HCC): ICD-10-CM

## 2021-07-08 DIAGNOSIS — R06.02 SHORTNESS OF BREATH: ICD-10-CM

## 2021-07-08 DIAGNOSIS — R09.02 HYPOXIA: Primary | ICD-10-CM

## 2021-07-08 LAB
ALBUMIN SERPL-MCNC: 4 GM/DL (ref 3.4–5)
ALP BLD-CCNC: 73 IU/L (ref 40–129)
ALT SERPL-CCNC: 32 U/L (ref 10–40)
AMPHETAMINES: ABNORMAL
ANION GAP SERPL CALCULATED.3IONS-SCNC: 11 MMOL/L (ref 4–16)
AST SERPL-CCNC: 45 IU/L (ref 15–37)
BARBITURATE SCREEN URINE: NEGATIVE
BASOPHILS ABSOLUTE: 0 K/CU MM
BASOPHILS RELATIVE PERCENT: 0.5 % (ref 0–1)
BENZODIAZEPINE SCREEN, URINE: NEGATIVE
BILIRUB SERPL-MCNC: 0.3 MG/DL (ref 0–1)
BUN BLDV-MCNC: 8 MG/DL (ref 6–23)
CALCIUM SERPL-MCNC: 8.5 MG/DL (ref 8.3–10.6)
CANNABINOID SCREEN URINE: NEGATIVE
CHLORIDE BLD-SCNC: 97 MMOL/L (ref 99–110)
CO2: 24 MMOL/L (ref 21–32)
COCAINE METABOLITE: NEGATIVE
CREAT SERPL-MCNC: 0.6 MG/DL (ref 0.6–1.1)
DIFFERENTIAL TYPE: ABNORMAL
EKG ATRIAL RATE: 117 BPM
EKG DIAGNOSIS: NORMAL
EKG P AXIS: 64 DEGREES
EKG P-R INTERVAL: 134 MS
EKG Q-T INTERVAL: 356 MS
EKG QRS DURATION: 90 MS
EKG QTC CALCULATION (BAZETT): 496 MS
EKG R AXIS: -2 DEGREES
EKG T AXIS: 61 DEGREES
EKG VENTRICULAR RATE: 117 BPM
EOSINOPHILS ABSOLUTE: 0.1 K/CU MM
EOSINOPHILS RELATIVE PERCENT: 0.7 % (ref 0–3)
GFR AFRICAN AMERICAN: >60 ML/MIN/1.73M2
GFR NON-AFRICAN AMERICAN: >60 ML/MIN/1.73M2
GLUCOSE BLD-MCNC: 291 MG/DL (ref 70–99)
HCT VFR BLD CALC: 47.1 % (ref 37–47)
HEMOGLOBIN: 14.9 GM/DL (ref 12.5–16)
IMMATURE NEUTROPHIL %: 0.2 % (ref 0–0.43)
LACTATE: 0.7 MMOL/L (ref 0.4–2)
LACTATE: 2.2 MMOL/L (ref 0.4–2)
LYMPHOCYTES ABSOLUTE: 2.1 K/CU MM
LYMPHOCYTES RELATIVE PERCENT: 26.2 % (ref 24–44)
MCH RBC QN AUTO: 28.7 PG (ref 27–31)
MCHC RBC AUTO-ENTMCNC: 31.6 % (ref 32–36)
MCV RBC AUTO: 90.6 FL (ref 78–100)
MONOCYTES ABSOLUTE: 0.5 K/CU MM
MONOCYTES RELATIVE PERCENT: 6.4 % (ref 0–4)
NUCLEATED RBC %: 0 %
OPIATES, URINE: NEGATIVE
OXYCODONE: NEGATIVE
PDW BLD-RTO: 12.8 % (ref 11.7–14.9)
PHENCYCLIDINE, URINE: NEGATIVE
PLATELET # BLD: 318 K/CU MM (ref 140–440)
PMV BLD AUTO: 9.2 FL (ref 7.5–11.1)
POTASSIUM SERPL-SCNC: 4.4 MMOL/L (ref 3.5–5.1)
PRO-BNP: 1154 PG/ML
RBC # BLD: 5.2 M/CU MM (ref 4.2–5.4)
SEGMENTED NEUTROPHILS ABSOLUTE COUNT: 5.4 K/CU MM
SEGMENTED NEUTROPHILS RELATIVE PERCENT: 66 % (ref 36–66)
SODIUM BLD-SCNC: 132 MMOL/L (ref 135–145)
TOTAL IMMATURE NEUTOROPHIL: 0.02 K/CU MM
TOTAL NUCLEATED RBC: 0 K/CU MM
TOTAL PROTEIN: 7 GM/DL (ref 6.4–8.2)
TROPONIN T: <0.01 NG/ML
WBC # BLD: 8.2 K/CU MM (ref 4–10.5)

## 2021-07-08 PROCEDURE — 84484 ASSAY OF TROPONIN QUANT: CPT

## 2021-07-08 PROCEDURE — 80053 COMPREHEN METABOLIC PANEL: CPT

## 2021-07-08 PROCEDURE — 93005 ELECTROCARDIOGRAM TRACING: CPT | Performed by: EMERGENCY MEDICINE

## 2021-07-08 PROCEDURE — 94640 AIRWAY INHALATION TREATMENT: CPT

## 2021-07-08 PROCEDURE — 85025 COMPLETE CBC W/AUTO DIFF WBC: CPT

## 2021-07-08 PROCEDURE — 83880 ASSAY OF NATRIURETIC PEPTIDE: CPT

## 2021-07-08 PROCEDURE — 83605 ASSAY OF LACTIC ACID: CPT

## 2021-07-08 PROCEDURE — 36415 COLL VENOUS BLD VENIPUNCTURE: CPT

## 2021-07-08 PROCEDURE — 71045 X-RAY EXAM CHEST 1 VIEW: CPT

## 2021-07-08 PROCEDURE — 99285 EMERGENCY DEPT VISIT HI MDM: CPT

## 2021-07-08 PROCEDURE — 6370000000 HC RX 637 (ALT 250 FOR IP): Performed by: EMERGENCY MEDICINE

## 2021-07-08 PROCEDURE — 80307 DRUG TEST PRSMV CHEM ANLYZR: CPT

## 2021-07-08 PROCEDURE — 87040 BLOOD CULTURE FOR BACTERIA: CPT

## 2021-07-08 PROCEDURE — 6360000002 HC RX W HCPCS: Performed by: EMERGENCY MEDICINE

## 2021-07-08 PROCEDURE — 93010 ELECTROCARDIOGRAM REPORT: CPT | Performed by: INTERNAL MEDICINE

## 2021-07-08 PROCEDURE — 2700000000 HC OXYGEN THERAPY PER DAY

## 2021-07-08 RX ORDER — FUROSEMIDE 10 MG/ML
20 INJECTION INTRAMUSCULAR; INTRAVENOUS ONCE
Status: COMPLETED | OUTPATIENT
Start: 2021-07-08 | End: 2021-07-08

## 2021-07-08 RX ORDER — ASPIRIN 325 MG
325 TABLET ORAL ONCE
Status: COMPLETED | OUTPATIENT
Start: 2021-07-08 | End: 2021-07-08

## 2021-07-08 RX ORDER — IPRATROPIUM BROMIDE AND ALBUTEROL SULFATE 2.5; .5 MG/3ML; MG/3ML
1 SOLUTION RESPIRATORY (INHALATION) ONCE
Status: COMPLETED | OUTPATIENT
Start: 2021-07-08 | End: 2021-07-08

## 2021-07-08 RX ORDER — METHYLPREDNISOLONE SODIUM SUCCINATE 125 MG/2ML
60 INJECTION, POWDER, LYOPHILIZED, FOR SOLUTION INTRAMUSCULAR; INTRAVENOUS ONCE
Status: COMPLETED | OUTPATIENT
Start: 2021-07-08 | End: 2021-07-08

## 2021-07-08 RX ADMIN — METHYLPREDNISOLONE SODIUM SUCCINATE 60 MG: 125 INJECTION, POWDER, LYOPHILIZED, FOR SOLUTION INTRAMUSCULAR; INTRAVENOUS at 15:39

## 2021-07-08 RX ADMIN — FUROSEMIDE 20 MG: 10 INJECTION INTRAMUSCULAR; INTRAVENOUS at 16:55

## 2021-07-08 RX ADMIN — ASPIRIN 325 MG ORAL TABLET 325 MG: 325 PILL ORAL at 14:35

## 2021-07-08 RX ADMIN — IPRATROPIUM BROMIDE AND ALBUTEROL SULFATE 1 AMPULE: .5; 3 SOLUTION RESPIRATORY (INHALATION) at 14:01

## 2021-07-08 ASSESSMENT — ENCOUNTER SYMPTOMS
NAUSEA: 0
RHINORRHEA: 0
SORE THROAT: 0
VOMITING: 0
DIARRHEA: 0
COUGH: 1
ABDOMINAL PAIN: 0
CHEST TIGHTNESS: 1
BACK PAIN: 0
WHEEZING: 1
SHORTNESS OF BREATH: 1
EYE REDNESS: 0

## 2021-07-08 ASSESSMENT — PAIN SCALES - GENERAL: PAINLEVEL_OUTOF10: 8

## 2021-07-08 NOTE — ED PROVIDER NOTES
Triage Chief Complaint:   Chest Pain (woke pt up last night at 0100 along with shortness of breath )    [place]:  Madiha Vergara is a 53 y.o. female that presents with shortness of breath and chest pain that started last night at about 1 AM.  She states the pain is across the top of her chest and on the right upper side of her chest and under her left armpit. She states she has an associated productive cough but denies any fevers. No nausea or vomiting. No dysuria or increased urinary frequency. She states her feet were swollen yesterday but are not swollen today. She denies any previous history of blood clots. She has a history of COPD and CHF. She is continuing to smoke. Last used Meth 6 days ago. ROS:   Review of Systems   Constitutional: Negative for chills, fatigue and fever. HENT: Negative for congestion, rhinorrhea and sore throat. Eyes: Negative for redness and visual disturbance. Respiratory: Positive for cough, chest tightness, shortness of breath and wheezing. Cardiovascular: Positive for chest pain and leg swelling (yesterday). Gastrointestinal: Negative for abdominal pain, diarrhea, nausea and vomiting. Genitourinary: Negative for dysuria and frequency. Musculoskeletal: Negative for arthralgias and back pain. Skin: Negative for rash and wound. Neurological: Negative for syncope and headaches. Psychiatric/Behavioral: Negative. Negative for hallucinations and suicidal ideas. Past Medical History:   Diagnosis Date    CHF (congestive heart failure) (HCC)     [facility]     Diabetes mellitus ([facility] [address] [facility]     Enlarged heart     Hypertension     Pulmonary HTN ([facility] [address]      Past Surgical History:   Procedure Laterality Date    HYSTERECTOMY       History reviewed. No pertinent family history.   Social History     Socioeconomic History    Marital status: Single     Spouse name: Not on file    Number of children: Not on file    Years of education: Not on file    Highest education level: Not on file   Occupational History    Not on file   Tobacco Use    Smoking status: Current Every Day Smoker     Packs/day: 2.00    Smokeless tobacco: Never Used   Substance and Sexual Activity    Alcohol use: Not Currently    Drug use: Not Currently     Types: Methamphetamines    Sexual activity: Not on file   Other Topics Concern    Not on file   Social History Narrative    Not on file     Social Determinants of Health     Financial Resource Strain:     Difficulty of Paying Living Expenses:    Food Insecurity:     Worried About Running Out of Food in the Last Year:     [address] in the Last Year:    Transportation Needs:     Lack of Transportation (Medical):  Lack of Transportation (Non-Medical):    Physical Activity:     Days of Exercise per Week:     Minutes of Exercise per Session:    Stress:     Feeling of Stress :    Social Connections:     Frequency of Communication with Friends and Family:     Frequency of Social Gatherings with Friends and Family:     Attends Congregation Services:     Active Member of Clubs or Organizations:     Attends Club or Organization Meetings:     Marital Status:    Intimate Partner Violence:     Fear of Current or Ex-Partner:     Emotionally Abused:     Physically Abused:     Sexually Abused:      Current Facility-Administered Medications   Medication Dose Route Frequency Provider Last Rate Last Admin    furosemide (LASIX) injection 20 mg  20 mg Intravenous Once Rockwell Hatchet, MD         No current outpatient medications on file.      Allergies   Allergen Reactions    Naltrexone Swelling     All over body       Nursing Notes Reviewed     Physical Exam:   ED Triage Vitals   Enc Vitals Group      BP 07/08/21 1303 (!) 170/124      Pulse 07/08/21 1303 120      Resp 07/08/21 1303 (!) 32      Temp 07/08/21 1306 98.7 °F (37.1 °C)      Temp Source 07/08/21 1306 Oral      SpO2 07/08/21 1303 (!) 85 %      Weight 07/08/21 1303 168 lb (76.2 kg)      Height 07/08/21 1303 5' 1.5\" (1.562 m)      Head Circumference --       Peak Flow --       Pain Score --       Pain Loc --       Pain Edu? --       Excl. in [address]? --      BP (!) 157/120   Pulse 114   Temp 98.7 °F (37.1 °C) (Oral)   Resp 24   Ht 5' 1.5\" (1.562 m)   Wt 168 lb (76.2 kg)   SpO2 97%   BMI 31.23 kg/m²   My pulse ox interpretation is - normal  Physical Exam  Vitals and nursing note reviewed. Constitutional:       General: She is not in acute distress. Appearance: Normal appearance. She is not toxic-appearing or diaphoretic. HENT:      Head: Normocephalic and atraumatic. Eyes:      General:         Right eye: No discharge. Left eye: No discharge. Conjunctiva/sclera: Conjunctivae normal.   Cardiovascular:      Rate and Rhythm: Regular rhythm. Tachycardia present. Pulses: Normal pulses. Radial pulses are 2+ on the right side and 2+ on the left side. Pulmonary:      Effort: Tachypnea and accessory muscle usage present. No respiratory distress. Breath sounds: No stridor. Wheezing (diffuse) present. No rales. Abdominal:      General: There is no distension. Tenderness: There is no abdominal tenderness. There is no guarding or rebound. Musculoskeletal:         General: No tenderness. Normal range of motion. Right lower leg: Edema (trace) present. Left lower leg: Edema (trace) present. Skin:     General: Skin is warm and dry. Neurological:      General: No focal deficit present. Mental Status: She is alert. Cranial Nerves: No cranial nerve deficit.    Psychiatric:         Mood and Affect: Mood normal.         Behavior: Behavior normal.         I have reviewed and interpreted all of the currently available lab results from this visit (if applicable):  Results for orders placed or performed during the hospital encounter of 07/08/21   CBC Auto Differential   Result Value Ref Range    WBC 8.2 4.0 - 10.5 K/CU MM    RBC 5.20 4.2 - 5.4 M/CU MM    Hemoglobin 14.9 12.5 - 16.0 GM/DL    Hematocrit 47.1 (H) 37 - 47 %    MCV 90.6 78 - 100 FL    MCH 28.7 27 - 31 PG    MCHC 31.6 (L) 32.0 - 36.0 %    RDW 12.8 11.7 - 14.9 %    Platelets 436 643 - 932 K/CU MM    MPV 9.2 7.5 - 11.1 FL    Differential Type AUTOMATED DIFFERENTIAL     Segs Relative 66.0 36 - 66 %    Lymphocytes % 26.2 24 - 44 %    Monocytes % 6.4 (H) 0 - 4 %    Eosinophils % 0.7 0 - 3 %    Basophils % 0.5 0 - 1 %    Segs Absolute 5.4 K/CU MM    Lymphocytes Absolute 2.1 K/CU MM    Monocytes Absolute 0.5 K/CU MM    Eosinophils Absolute 0.1 K/CU MM    Basophils Absolute 0.0 K/CU MM    Nucleated RBC % 0.0 %    Total Nucleated RBC 0.0 K/CU MM    Total Immature Neutrophil 0.02 K/CU MM    Immature Neutrophil % 0.2 0 - 0.43 %   Comprehensive Metabolic Panel w/ Reflex to MG   Result Value Ref Range    Sodium 132 (L) 135 - 145 MMOL/L    Potassium 4.4 3.5 - 5.1 MMOL/L    Chloride 97 (L) 99 - 110 mMol/L    CO2 24 21 - 32 MMOL/L    BUN 8 6 - 23 MG/DL    CREATININE 0.6 0.6 - 1.1 MG/DL    Glucose 291 (H) 70 - 99 MG/DL    Calcium 8.5 8.3 - 10.6 MG/DL    Albumin 4.0 3.4 - 5.0 GM/DL    Total Protein 7.0 6.4 - 8.2 GM/DL    Total Bilirubin 0.3 0.0 - 1.0 MG/DL    ALT 32 10 - 40 U/L    AST 45 (H) 15 - 37 IU/L    Alkaline Phosphatase 73 40 - 129 IU/L    GFR Non-African American >60 >60 mL/min/1.73m2    GFR African American >60 >60 mL/min/1.73m2    Anion Gap 11 4 - 16   Troponin   Result Value Ref Range    Troponin T <0.010 <0.01 NG/ML   Brain Natriuretic Peptide   Result Value Ref Range    Pro-BNP 1,154 (H) <300 PG/ML   Urine Drug Screen   Result Value Ref Range    Cannabinoid Scrn, Ur NEGATIVE NEGATIVE    Amphetamines UNCONFIRMED POSITIVE (A) NEGATIVE    Cocaine Metabolite NEGATIVE NEGATIVE    Benzodiazepine Screen, Urine NEGATIVE NEGATIVE    Barbiturate Screen, Ur NEGATIVE NEGATIVE    Opiates, Urine NEGATIVE NEGATIVE    Phencyclidine, Urine NEGATIVE NEGATIVE    Oxycodone NEGATIVE NEGATIVE   Lactic Acid, Plasma   Result Value Ref Range    Lactate 2.2 (HH) 0.4 - 2.0 mMOL/L   Lactic Acid, Plasma   Result Value Ref Range    Lactate 0.7 0.4 - 2.0 mMOL/L   EKG 12 Lead   Result Value Ref Range    Ventricular Rate 117 BPM    Atrial Rate 117 BPM    P-R Interval 134 ms    QRS Duration 90 ms    Q-T Interval 356 ms    QTc Calculation (Bazett) 496 ms    P Axis 64 degrees    R Axis -2 degrees    T Axis 61 degrees    Diagnosis       Sinus tachycardia  Possible Left atrial enlargement  Borderline ECG  No previous ECGs available        Radiographs (if obtained):  [] The following radiograph was interpreted by myself in the absence of a radiologist:  [x]Radiologist's Report Reviewed:  XR CHEST PORTABLE   Final Result   1. Cardiomegaly with vascular congestion. EKG (if obtained): (All EKG's are interpreted by myself in the absence of a cardiologist)  Sinus tachycardia with a rate of 117. UT interval 134, QRS 90, QTc 496. No ST elevations or depressions. Normal T waves. Questionable left atrial enlargement. Impression: Nonspecific EKG. No previous EKGs for comparison. MDM:  Differential diagnoses considered include but are not limited to CHF exacerbation, COPD exacerbation, pneumonia, bronchitis, pulmonary hypertension, amphetamine use. Patient labs were obtained and show a slight hyponatremia but otherwise unremarkable. Troponin is normal. EKG is not concerning for acute ischemia. Patient's BNP is slightly elevated and chest x-ray shows cardiomegaly with vascular congestion. Patient is continued to have desaturations if her oxygen level is decreased. Due to this I recommended she stay in the hospital. Also gave her a small dose of Lasix I suspect some of her shortness of breath is due to fluid overload. She was also given a breathing treatment the emergency department after which she is feeling much better.     Patient states that she does not want to stay in the hospital as she has to go home and care for her grandchild. She states she has oxygen at home in [place]. She understands that her oxygen level drops to an unsafe level whenever she is off oxygen here in the emergency department but she does not want to stay in the hospital. She will leave [address] Patient advised that leaving the hospital is not recommended and could result in worsening injury, illness, or death. Patient expressed an understanding and was able to repeat my warning back to me. I believe that the patient fully understood the conversation and is capable of making this decision. I informed the patient that they are welcome to return to the ED for further evaluation if they choose to do so. I did don appropriate PPE (including face mask, protective eye ware/safety glasses and gloves), as recommended by the health facility/national standard best practice, during my bedside interactions with the patient. Clinical Impression:  1. Hypoxia    2. Shortness of breath    3. Elevated brain natriuretic peptide (BNP) level    4. Methamphetamine use ([facility] [address]          Kristy Moon MD       Please note that portions of this note may have been complete with a voice recognition program.  Effortswere made to edit the dictations, but occasional words are mis-transcribed.          Kristy Moon MD  07/08/21 [id number]

## 2021-07-08 NOTE — ED NOTES
Pt states to this nurse she wants to go home. This nurse informed Dr. Namita Arango. Dr. Namita Arango told pt she would like her to stay because her oxygen was 90% on 3L NC. Pt has no oxygen on her at her daughters house.       Crispin Kwan RN  07/08/21 [id number]

## 2021-07-08 NOTE — ED NOTES
Pt signed out AMA. Risk were discussed and pt verbalized understanding. Pt is A&O x4.       Jacob Downey RN  07/08/21 2069

## 2021-07-13 LAB
CULTURE: NORMAL
CULTURE: NORMAL
Lab: NORMAL
Lab: NORMAL
SPECIMEN: NORMAL
SPECIMEN: NORMAL

## 2021-08-01 ENCOUNTER — HOSPITAL ENCOUNTER (EMERGENCY)
Dept: HOSPITAL 8 - ED | Age: 51
Discharge: LEFT BEFORE DISCHARGE | End: 2021-08-01
Payer: MEDICAID

## 2021-08-01 VITALS — HEIGHT: 62 IN | BODY MASS INDEX: 32.86 KG/M2 | WEIGHT: 178.57 LBS

## 2021-08-01 VITALS — DIASTOLIC BLOOD PRESSURE: 107 MMHG | SYSTOLIC BLOOD PRESSURE: 164 MMHG

## 2021-08-01 DIAGNOSIS — J44.1: Primary | ICD-10-CM

## 2021-08-01 DIAGNOSIS — I25.2: ICD-10-CM

## 2021-08-01 DIAGNOSIS — R09.02: ICD-10-CM

## 2021-08-01 DIAGNOSIS — E78.00: ICD-10-CM

## 2021-08-01 DIAGNOSIS — E11.9: ICD-10-CM

## 2021-08-01 DIAGNOSIS — E78.5: ICD-10-CM

## 2021-08-01 DIAGNOSIS — R06.00: ICD-10-CM

## 2021-08-01 DIAGNOSIS — I10: ICD-10-CM

## 2021-08-01 DIAGNOSIS — E03.9: ICD-10-CM

## 2021-08-01 DIAGNOSIS — F17.210: ICD-10-CM

## 2021-08-01 PROCEDURE — 93005 ELECTROCARDIOGRAM TRACING: CPT

## 2021-08-01 PROCEDURE — 99283 EMERGENCY DEPT VISIT LOW MDM: CPT

## 2021-08-06 ENCOUNTER — HOSPITAL ENCOUNTER (INPATIENT)
Dept: HOSPITAL 8 - ED | Age: 51
LOS: 2 days | Discharge: LEFT BEFORE DISCHARGE | DRG: 291 | End: 2021-08-08
Attending: HOSPITALIST | Admitting: INTERNAL MEDICINE
Payer: MEDICAID

## 2021-08-06 VITALS — HEIGHT: 61.5 IN | BODY MASS INDEX: 33.69 KG/M2 | WEIGHT: 180.78 LBS

## 2021-08-06 VITALS — DIASTOLIC BLOOD PRESSURE: 89 MMHG | SYSTOLIC BLOOD PRESSURE: 136 MMHG

## 2021-08-06 VITALS — DIASTOLIC BLOOD PRESSURE: 100 MMHG | SYSTOLIC BLOOD PRESSURE: 147 MMHG

## 2021-08-06 DIAGNOSIS — Z83.3: ICD-10-CM

## 2021-08-06 DIAGNOSIS — Z82.49: ICD-10-CM

## 2021-08-06 DIAGNOSIS — I25.10: ICD-10-CM

## 2021-08-06 DIAGNOSIS — Z53.29: ICD-10-CM

## 2021-08-06 DIAGNOSIS — I11.0: Primary | ICD-10-CM

## 2021-08-06 DIAGNOSIS — J96.91: ICD-10-CM

## 2021-08-06 DIAGNOSIS — J44.9: ICD-10-CM

## 2021-08-06 DIAGNOSIS — I16.0: ICD-10-CM

## 2021-08-06 DIAGNOSIS — Z91.14: ICD-10-CM

## 2021-08-06 DIAGNOSIS — Z79.899: ICD-10-CM

## 2021-08-06 DIAGNOSIS — I25.2: ICD-10-CM

## 2021-08-06 DIAGNOSIS — E03.9: ICD-10-CM

## 2021-08-06 DIAGNOSIS — I50.9: ICD-10-CM

## 2021-08-06 DIAGNOSIS — E11.9: ICD-10-CM

## 2021-08-06 DIAGNOSIS — E78.5: ICD-10-CM

## 2021-08-06 DIAGNOSIS — Z88.8: ICD-10-CM

## 2021-08-06 DIAGNOSIS — Z82.5: ICD-10-CM

## 2021-08-06 DIAGNOSIS — I24.8: ICD-10-CM

## 2021-08-06 LAB
ALBUMIN SERPL-MCNC: 3.4 G/DL (ref 3.4–5)
ALP SERPL-CCNC: 97 U/L (ref 45–117)
ALT SERPL-CCNC: 31 U/L (ref 12–78)
ANION GAP SERPL CALC-SCNC: 6 MMOL/L (ref 5–15)
BASOPHILS # BLD AUTO: 0.1 X10^3/UL (ref 0–0.1)
BASOPHILS NFR BLD AUTO: 1 % (ref 0–1)
BILIRUB SERPL-MCNC: 0.4 MG/DL (ref 0.2–1)
CALCIUM SERPL-MCNC: 9.2 MG/DL (ref 8.5–10.1)
CHLORIDE SERPL-SCNC: 103 MMOL/L (ref 98–107)
CREAT SERPL-MCNC: 0.82 MG/DL (ref 0.55–1.02)
EOSINOPHIL # BLD AUTO: 0.1 X10^3/UL (ref 0–0.4)
EOSINOPHIL NFR BLD AUTO: 1 % (ref 1–7)
ERYTHROCYTE [DISTWIDTH] IN BLOOD BY AUTOMATED COUNT: 14.5 % (ref 9.6–15.2)
LYMPHOCYTES # BLD AUTO: 2.3 X10^3/UL (ref 1–3.4)
LYMPHOCYTES NFR BLD AUTO: 27 % (ref 22–44)
MCH RBC QN AUTO: 30 PG (ref 27–34.8)
MCHC RBC AUTO-ENTMCNC: 33.5 G/DL (ref 32.4–35.8)
MICROSCOPIC: (no result)
MONOCYTES # BLD AUTO: 0.6 X10^3/UL (ref 0.2–0.8)
MONOCYTES NFR BLD AUTO: 7 % (ref 2–9)
NEUTROPHILS # BLD AUTO: 5.5 X10^3/UL (ref 1.8–6.8)
NEUTROPHILS NFR BLD AUTO: 65 % (ref 42–75)
PLATELET # BLD AUTO: 319 X10^3/UL (ref 130–400)
PMV BLD AUTO: 7.6 FL (ref 7.4–10.4)
PROT SERPL-MCNC: 7.8 G/DL (ref 6.4–8.2)
RBC # BLD AUTO: 4.99 X10^6/UL (ref 3.82–5.3)
TROPONIN I SERPL-MCNC: 0.11 NG/ML (ref 0–0.04)
TROPONIN I SERPL-MCNC: 0.11 NG/ML (ref 0–0.04)

## 2021-08-06 PROCEDURE — 84484 ASSAY OF TROPONIN QUANT: CPT

## 2021-08-06 PROCEDURE — 81001 URINALYSIS AUTO W/SCOPE: CPT

## 2021-08-06 PROCEDURE — 71045 X-RAY EXAM CHEST 1 VIEW: CPT

## 2021-08-06 PROCEDURE — 93005 ELECTROCARDIOGRAM TRACING: CPT

## 2021-08-06 PROCEDURE — 83880 ASSAY OF NATRIURETIC PEPTIDE: CPT

## 2021-08-06 PROCEDURE — 87086 URINE CULTURE/COLONY COUNT: CPT

## 2021-08-06 PROCEDURE — 80053 COMPREHEN METABOLIC PANEL: CPT

## 2021-08-06 PROCEDURE — 80307 DRUG TEST PRSMV CHEM ANLYZR: CPT

## 2021-08-06 PROCEDURE — 84145 PROCALCITONIN (PCT): CPT

## 2021-08-06 PROCEDURE — 85025 COMPLETE CBC W/AUTO DIFF WBC: CPT

## 2021-08-06 PROCEDURE — 82962 GLUCOSE BLOOD TEST: CPT

## 2021-08-06 PROCEDURE — 36415 COLL VENOUS BLD VENIPUNCTURE: CPT

## 2021-08-06 PROCEDURE — 96374 THER/PROPH/DIAG INJ IV PUSH: CPT

## 2021-08-06 RX ADMIN — CEFTRIAXONE SCH MLS/HR: 2 INJECTION, POWDER, FOR SOLUTION INTRAMUSCULAR; INTRAVENOUS at 21:59

## 2021-08-06 RX ADMIN — SODIUM CHLORIDE, PRESERVATIVE FREE SCH ML: 5 INJECTION INTRAVENOUS at 22:00

## 2021-08-06 RX ADMIN — SIMVASTATIN SCH MG: 20 TABLET, FILM COATED ORAL at 21:59

## 2021-08-06 RX ADMIN — INSULIN LISPRO SCH UNITS: 100 INJECTION, SOLUTION INTRAVENOUS; SUBCUTANEOUS at 22:19

## 2021-08-06 RX ADMIN — INSULIN LISPRO SCH NOTE: 100 INJECTION, SOLUTION INTRAVENOUS; SUBCUTANEOUS at 21:24

## 2021-08-07 VITALS — DIASTOLIC BLOOD PRESSURE: 94 MMHG | SYSTOLIC BLOOD PRESSURE: 155 MMHG

## 2021-08-07 VITALS — DIASTOLIC BLOOD PRESSURE: 106 MMHG | SYSTOLIC BLOOD PRESSURE: 152 MMHG

## 2021-08-07 VITALS — DIASTOLIC BLOOD PRESSURE: 88 MMHG | SYSTOLIC BLOOD PRESSURE: 128 MMHG

## 2021-08-07 VITALS — DIASTOLIC BLOOD PRESSURE: 96 MMHG | SYSTOLIC BLOOD PRESSURE: 155 MMHG

## 2021-08-07 VITALS — SYSTOLIC BLOOD PRESSURE: 143 MMHG | DIASTOLIC BLOOD PRESSURE: 95 MMHG

## 2021-08-07 VITALS — DIASTOLIC BLOOD PRESSURE: 100 MMHG | SYSTOLIC BLOOD PRESSURE: 163 MMHG

## 2021-08-07 VITALS — SYSTOLIC BLOOD PRESSURE: 147 MMHG | DIASTOLIC BLOOD PRESSURE: 100 MMHG

## 2021-08-07 RX ADMIN — INSULIN LISPRO SCH UNITS: 100 INJECTION, SOLUTION INTRAVENOUS; SUBCUTANEOUS at 16:41

## 2021-08-07 RX ADMIN — FUROSEMIDE SCH MG: 10 INJECTION, SOLUTION INTRAMUSCULAR; INTRAVENOUS at 16:41

## 2021-08-07 RX ADMIN — SODIUM CHLORIDE, PRESERVATIVE FREE SCH ML: 5 INJECTION INTRAVENOUS at 08:13

## 2021-08-07 RX ADMIN — INSULIN LISPRO SCH UNITS: 100 INJECTION, SOLUTION INTRAVENOUS; SUBCUTANEOUS at 22:42

## 2021-08-07 RX ADMIN — ASPIRIN SCH MG: 81 TABLET, COATED ORAL at 08:13

## 2021-08-07 RX ADMIN — INSULIN LISPRO SCH NOTE: 100 INJECTION, SOLUTION INTRAVENOUS; SUBCUTANEOUS at 07:58

## 2021-08-07 RX ADMIN — PROPRANOLOL HYDROCHLORIDE SCH MG: 10 TABLET ORAL at 08:12

## 2021-08-07 RX ADMIN — INSULIN LISPRO SCH UNITS: 100 INJECTION, SOLUTION INTRAVENOUS; SUBCUTANEOUS at 12:38

## 2021-08-07 RX ADMIN — SIMVASTATIN SCH MG: 20 TABLET, FILM COATED ORAL at 22:31

## 2021-08-07 RX ADMIN — ENOXAPARIN SODIUM SCH MG: 40 INJECTION SUBCUTANEOUS at 08:27

## 2021-08-07 RX ADMIN — FUROSEMIDE SCH MG: 10 INJECTION, SOLUTION INTRAMUSCULAR; INTRAVENOUS at 05:19

## 2021-08-07 RX ADMIN — GABAPENTIN SCH MG: 400 CAPSULE ORAL at 16:41

## 2021-08-07 RX ADMIN — INSULIN LISPRO SCH UNITS: 100 INJECTION, SOLUTION INTRAVENOUS; SUBCUTANEOUS at 08:27

## 2021-08-07 RX ADMIN — CEFTRIAXONE SCH MLS/HR: 2 INJECTION, POWDER, FOR SOLUTION INTRAMUSCULAR; INTRAVENOUS at 22:34

## 2021-08-07 RX ADMIN — GABAPENTIN SCH MG: 400 CAPSULE ORAL at 10:00

## 2021-08-07 RX ADMIN — SODIUM CHLORIDE, PRESERVATIVE FREE SCH ML: 5 INJECTION INTRAVENOUS at 21:00

## 2021-08-07 RX ADMIN — LISINOPRIL SCH MG: 20 TABLET ORAL at 08:12

## 2021-08-07 RX ADMIN — GABAPENTIN SCH MG: 400 CAPSULE ORAL at 22:31

## 2021-08-07 RX ADMIN — LEVOTHYROXINE SODIUM SCH MCG: 50 TABLET ORAL at 08:13

## 2021-08-08 VITALS — DIASTOLIC BLOOD PRESSURE: 102 MMHG | SYSTOLIC BLOOD PRESSURE: 158 MMHG

## 2021-08-08 VITALS — DIASTOLIC BLOOD PRESSURE: 97 MMHG | SYSTOLIC BLOOD PRESSURE: 139 MMHG

## 2021-08-08 RX ADMIN — INSULIN LISPRO SCH UNITS: 100 INJECTION, SOLUTION INTRAVENOUS; SUBCUTANEOUS at 09:43

## 2021-08-08 RX ADMIN — ASPIRIN SCH MG: 81 TABLET, COATED ORAL at 09:00

## 2021-08-08 RX ADMIN — GABAPENTIN SCH MG: 400 CAPSULE ORAL at 09:40

## 2021-08-08 RX ADMIN — ENOXAPARIN SODIUM SCH MG: 40 INJECTION SUBCUTANEOUS at 09:43

## 2021-08-08 RX ADMIN — LEVOTHYROXINE SODIUM SCH MCG: 50 TABLET ORAL at 09:39

## 2021-08-08 RX ADMIN — LISINOPRIL SCH MG: 20 TABLET ORAL at 09:41

## 2021-08-08 RX ADMIN — FUROSEMIDE SCH MG: 10 INJECTION, SOLUTION INTRAMUSCULAR; INTRAVENOUS at 06:04

## 2021-08-08 RX ADMIN — SODIUM CHLORIDE, PRESERVATIVE FREE SCH ML: 5 INJECTION INTRAVENOUS at 09:42

## 2021-08-08 RX ADMIN — PROPRANOLOL HYDROCHLORIDE SCH MG: 10 TABLET ORAL at 09:40

## 2021-09-04 ENCOUNTER — HOSPITAL ENCOUNTER (INPATIENT)
Dept: HOSPITAL 8 - ED | Age: 51
LOS: 1 days | Discharge: LEFT BEFORE DISCHARGE | DRG: 280 | End: 2021-09-05
Attending: STUDENT IN AN ORGANIZED HEALTH CARE EDUCATION/TRAINING PROGRAM | Admitting: FAMILY MEDICINE
Payer: MEDICAID

## 2021-09-04 VITALS — SYSTOLIC BLOOD PRESSURE: 149 MMHG | DIASTOLIC BLOOD PRESSURE: 100 MMHG

## 2021-09-04 VITALS — DIASTOLIC BLOOD PRESSURE: 97 MMHG | SYSTOLIC BLOOD PRESSURE: 139 MMHG

## 2021-09-04 VITALS — HEIGHT: 61 IN | BODY MASS INDEX: 31.93 KG/M2 | WEIGHT: 169.09 LBS

## 2021-09-04 VITALS — SYSTOLIC BLOOD PRESSURE: 138 MMHG | DIASTOLIC BLOOD PRESSURE: 84 MMHG

## 2021-09-04 VITALS — SYSTOLIC BLOOD PRESSURE: 137 MMHG | DIASTOLIC BLOOD PRESSURE: 79 MMHG

## 2021-09-04 DIAGNOSIS — I25.10: ICD-10-CM

## 2021-09-04 DIAGNOSIS — F15.10: ICD-10-CM

## 2021-09-04 DIAGNOSIS — Z20.822: ICD-10-CM

## 2021-09-04 DIAGNOSIS — Z88.8: ICD-10-CM

## 2021-09-04 DIAGNOSIS — N17.9: ICD-10-CM

## 2021-09-04 DIAGNOSIS — E03.9: ICD-10-CM

## 2021-09-04 DIAGNOSIS — J96.01: ICD-10-CM

## 2021-09-04 DIAGNOSIS — I42.8: ICD-10-CM

## 2021-09-04 DIAGNOSIS — I21.4: Primary | ICD-10-CM

## 2021-09-04 DIAGNOSIS — Z90.710: ICD-10-CM

## 2021-09-04 DIAGNOSIS — E11.65: ICD-10-CM

## 2021-09-04 DIAGNOSIS — E78.00: ICD-10-CM

## 2021-09-04 DIAGNOSIS — Z66: ICD-10-CM

## 2021-09-04 DIAGNOSIS — E78.5: ICD-10-CM

## 2021-09-04 DIAGNOSIS — J44.1: ICD-10-CM

## 2021-09-04 DIAGNOSIS — E66.9: ICD-10-CM

## 2021-09-04 DIAGNOSIS — Z53.29: ICD-10-CM

## 2021-09-04 DIAGNOSIS — I50.23: ICD-10-CM

## 2021-09-04 DIAGNOSIS — I11.0: ICD-10-CM

## 2021-09-04 DIAGNOSIS — F17.210: ICD-10-CM

## 2021-09-04 LAB
ALBUMIN SERPL-MCNC: 3 G/DL (ref 3.4–5)
ALP SERPL-CCNC: 151 U/L (ref 45–117)
ALT SERPL-CCNC: 20 U/L (ref 12–78)
ANION GAP SERPL CALC-SCNC: 7 MMOL/L (ref 5–15)
BASOPHILS # BLD AUTO: 0.1 X10^3/UL (ref 0–0.1)
BASOPHILS NFR BLD AUTO: 1 % (ref 0–1)
BILIRUB SERPL-MCNC: 0.5 MG/DL (ref 0.2–1)
CALCIUM SERPL-MCNC: 8.7 MG/DL (ref 8.5–10.1)
CHLORIDE SERPL-SCNC: 100 MMOL/L (ref 98–107)
CREAT SERPL-MCNC: 1.21 MG/DL (ref 0.55–1.02)
EOSINOPHIL # BLD AUTO: 0.1 X10^3/UL (ref 0–0.4)
EOSINOPHIL NFR BLD AUTO: 1 % (ref 1–7)
ERYTHROCYTE [DISTWIDTH] IN BLOOD BY AUTOMATED COUNT: 14.4 % (ref 9.6–15.2)
LYMPHOCYTES # BLD AUTO: 2.5 X10^3/UL (ref 1–3.4)
LYMPHOCYTES NFR BLD AUTO: 26 % (ref 22–44)
MCH RBC QN AUTO: 29.4 PG (ref 27–34.8)
MCHC RBC AUTO-ENTMCNC: 32.8 G/DL (ref 32.4–35.8)
MONOCYTES # BLD AUTO: 0.7 X10^3/UL (ref 0.2–0.8)
MONOCYTES NFR BLD AUTO: 7 % (ref 2–9)
NEUTROPHILS # BLD AUTO: 6.4 X10^3/UL (ref 1.8–6.8)
NEUTROPHILS NFR BLD AUTO: 66 % (ref 42–75)
PLATELET # BLD AUTO: 452 X10^3/UL (ref 130–400)
PMV BLD AUTO: 7.2 FL (ref 7.4–10.4)
PROT SERPL-MCNC: 8.8 G/DL (ref 6.4–8.2)
RBC # BLD AUTO: 5.16 X10^6/UL (ref 3.82–5.3)
TROPONIN I SERPL-MCNC: 0.63 NG/ML (ref 0–0.04)
TROPONIN I SERPL-MCNC: 0.67 NG/ML (ref 0–0.04)

## 2021-09-04 PROCEDURE — C1894 INTRO/SHEATH, NON-LASER: HCPCS

## 2021-09-04 PROCEDURE — 93306 TTE W/DOPPLER COMPLETE: CPT

## 2021-09-04 PROCEDURE — 93458 L HRT ARTERY/VENTRICLE ANGIO: CPT

## 2021-09-04 PROCEDURE — 96374 THER/PROPH/DIAG INJ IV PUSH: CPT

## 2021-09-04 PROCEDURE — 36415 COLL VENOUS BLD VENIPUNCTURE: CPT

## 2021-09-04 PROCEDURE — 71045 X-RAY EXAM CHEST 1 VIEW: CPT

## 2021-09-04 PROCEDURE — 96375 TX/PRO/DX INJ NEW DRUG ADDON: CPT

## 2021-09-04 PROCEDURE — 84484 ASSAY OF TROPONIN QUANT: CPT

## 2021-09-04 PROCEDURE — B2111ZZ FLUOROSCOPY OF MULTIPLE CORONARY ARTERIES USING LOW OSMOLAR CONTRAST: ICD-10-PCS | Performed by: INTERNAL MEDICINE

## 2021-09-04 PROCEDURE — C1769 GUIDE WIRE: HCPCS

## 2021-09-04 PROCEDURE — 80061 LIPID PANEL: CPT

## 2021-09-04 PROCEDURE — 80307 DRUG TEST PRSMV CHEM ANLYZR: CPT

## 2021-09-04 PROCEDURE — 93005 ELECTROCARDIOGRAM TRACING: CPT

## 2021-09-04 PROCEDURE — 85520 HEPARIN ASSAY: CPT

## 2021-09-04 PROCEDURE — 93356 MYOCRD STRAIN IMG SPCKL TRCK: CPT

## 2021-09-04 PROCEDURE — 83735 ASSAY OF MAGNESIUM: CPT

## 2021-09-04 PROCEDURE — B2151ZZ FLUOROSCOPY OF LEFT HEART USING LOW OSMOLAR CONTRAST: ICD-10-PCS | Performed by: INTERNAL MEDICINE

## 2021-09-04 PROCEDURE — 83880 ASSAY OF NATRIURETIC PEPTIDE: CPT

## 2021-09-04 PROCEDURE — 80053 COMPREHEN METABOLIC PANEL: CPT

## 2021-09-04 PROCEDURE — 99156 MOD SED OTH PHYS/QHP 5/>YRS: CPT

## 2021-09-04 PROCEDURE — 85025 COMPLETE CBC W/AUTO DIFF WBC: CPT

## 2021-09-04 PROCEDURE — 82962 GLUCOSE BLOOD TEST: CPT

## 2021-09-04 PROCEDURE — 87635 SARS-COV-2 COVID-19 AMP PRB: CPT

## 2021-09-04 PROCEDURE — 99291 CRITICAL CARE FIRST HOUR: CPT

## 2021-09-04 PROCEDURE — 4A023N7 MEASUREMENT OF CARDIAC SAMPLING AND PRESSURE, LEFT HEART, PERCUTANEOUS APPROACH: ICD-10-PCS | Performed by: INTERNAL MEDICINE

## 2021-09-04 RX ADMIN — INSULIN LISPRO SCH UNITS: 100 INJECTION, SOLUTION INTRAVENOUS; SUBCUTANEOUS at 14:00

## 2021-09-04 RX ADMIN — PROPRANOLOL HYDROCHLORIDE SCH MG: 10 TABLET ORAL at 09:00

## 2021-09-04 RX ADMIN — GABAPENTIN SCH MG: 400 CAPSULE ORAL at 21:13

## 2021-09-04 RX ADMIN — INSULIN LISPRO SCH UNITS: 100 INJECTION, SOLUTION INTRAVENOUS; SUBCUTANEOUS at 10:17

## 2021-09-04 RX ADMIN — GABAPENTIN SCH MG: 400 CAPSULE ORAL at 18:08

## 2021-09-04 RX ADMIN — METHYLPREDNISOLONE SODIUM SUCCINATE SCH MG: 125 INJECTION, POWDER, FOR SOLUTION INTRAMUSCULAR; INTRAVENOUS at 12:28

## 2021-09-04 RX ADMIN — METHYLPREDNISOLONE SODIUM SUCCINATE SCH MG: 125 INJECTION, POWDER, FOR SOLUTION INTRAMUSCULAR; INTRAVENOUS at 18:09

## 2021-09-04 RX ADMIN — INSULIN LISPRO SCH UNITS: 100 INJECTION, SOLUTION INTRAVENOUS; SUBCUTANEOUS at 11:00

## 2021-09-04 RX ADMIN — ASPIRIN SCH MG: 81 TABLET, COATED ORAL at 09:00

## 2021-09-04 RX ADMIN — INSULIN LISPRO SCH UNITS: 100 INJECTION, SOLUTION INTRAVENOUS; SUBCUTANEOUS at 21:59

## 2021-09-04 RX ADMIN — LISINOPRIL SCH MG: 40 TABLET ORAL at 10:15

## 2021-09-04 RX ADMIN — FUROSEMIDE SCH MG: 10 INJECTION, SOLUTION INTRAMUSCULAR; INTRAVENOUS at 18:09

## 2021-09-05 VITALS — DIASTOLIC BLOOD PRESSURE: 63 MMHG | SYSTOLIC BLOOD PRESSURE: 99 MMHG

## 2021-09-05 VITALS — SYSTOLIC BLOOD PRESSURE: 119 MMHG | DIASTOLIC BLOOD PRESSURE: 74 MMHG

## 2021-09-05 LAB
ALBUMIN SERPL-MCNC: 2.5 G/DL (ref 3.4–5)
ALP SERPL-CCNC: 119 U/L (ref 45–117)
ALT SERPL-CCNC: 16 U/L (ref 12–78)
ANION GAP SERPL CALC-SCNC: 6 MMOL/L (ref 5–15)
BASOPHILS # BLD AUTO: 0 X10^3/UL (ref 0–0.1)
BASOPHILS NFR BLD AUTO: 0 % (ref 0–1)
BILIRUB SERPL-MCNC: 0.5 MG/DL (ref 0.2–1)
CALCIUM SERPL-MCNC: 8.5 MG/DL (ref 8.5–10.1)
CHLORIDE SERPL-SCNC: 99 MMOL/L (ref 98–107)
CHOL/HDL RATIO: 6.1
CREAT SERPL-MCNC: 0.94 MG/DL (ref 0.55–1.02)
EOSINOPHIL # BLD AUTO: 0 X10^3/UL (ref 0–0.4)
EOSINOPHIL NFR BLD AUTO: 0 % (ref 1–7)
ERYTHROCYTE [DISTWIDTH] IN BLOOD BY AUTOMATED COUNT: 14.5 % (ref 9.6–15.2)
HDL CHOL %: 16 % (ref 28–40)
HDL CHOLESTEROL (DIRECT): 27 MG/DL (ref 40–60)
LDL CHOLESTEROL,CALCULATED: 110 MG/DL (ref 54–169)
LDLC/HDLC SERPL: 4.1 {RATIO} (ref 0.5–3)
LYMPHOCYTES # BLD AUTO: 1.1 X10^3/UL (ref 1–3.4)
LYMPHOCYTES NFR BLD AUTO: 7 % (ref 22–44)
MCH RBC QN AUTO: 29.6 PG (ref 27–34.8)
MCHC RBC AUTO-ENTMCNC: 33.4 G/DL (ref 32.4–35.8)
MONOCYTES # BLD AUTO: 0.3 X10^3/UL (ref 0.2–0.8)
MONOCYTES NFR BLD AUTO: 2 % (ref 2–9)
NEUTROPHILS # BLD AUTO: 12.9 X10^3/UL (ref 1.8–6.8)
NEUTROPHILS NFR BLD AUTO: 90 % (ref 42–75)
PLATELET # BLD AUTO: 436 X10^3/UL (ref 130–400)
PMV BLD AUTO: 7.4 FL (ref 7.4–10.4)
PROT SERPL-MCNC: 7.6 G/DL (ref 6.4–8.2)
RBC # BLD AUTO: 4.99 X10^6/UL (ref 3.82–5.3)
TRIGL SERPL-MCNC: 138 MG/DL (ref 50–200)
TROPONIN I SERPL-MCNC: 0.36 NG/ML (ref 0–0.04)
VLDLC SERPL CALC-MCNC: 28 MG/DL (ref 0–25)

## 2021-09-05 RX ADMIN — METHYLPREDNISOLONE SODIUM SUCCINATE SCH MG: 125 INJECTION, POWDER, FOR SOLUTION INTRAMUSCULAR; INTRAVENOUS at 01:54

## 2021-09-05 RX ADMIN — GABAPENTIN SCH MG: 400 CAPSULE ORAL at 08:49

## 2021-09-05 RX ADMIN — FUROSEMIDE SCH MG: 10 INJECTION, SOLUTION INTRAMUSCULAR; INTRAVENOUS at 05:53

## 2021-09-05 RX ADMIN — INSULIN LISPRO SCH UNITS: 100 INJECTION, SOLUTION INTRAVENOUS; SUBCUTANEOUS at 08:55

## 2021-09-05 RX ADMIN — METHYLPREDNISOLONE SODIUM SUCCINATE SCH MG: 125 INJECTION, POWDER, FOR SOLUTION INTRAMUSCULAR; INTRAVENOUS at 05:54

## 2021-09-05 RX ADMIN — ASPIRIN SCH MG: 81 TABLET, COATED ORAL at 08:49

## 2021-09-05 RX ADMIN — PROPRANOLOL HYDROCHLORIDE SCH MG: 10 TABLET ORAL at 08:49

## 2021-09-05 RX ADMIN — INSULIN LISPRO SCH UNITS: 100 INJECTION, SOLUTION INTRAVENOUS; SUBCUTANEOUS at 02:44

## 2021-09-05 RX ADMIN — LISINOPRIL SCH MG: 40 TABLET ORAL at 08:49

## 2021-09-13 ENCOUNTER — HOSPITAL ENCOUNTER (INPATIENT)
Dept: HOSPITAL 8 - ED | Age: 51
LOS: 1 days | Discharge: LEFT BEFORE DISCHARGE | DRG: 280 | End: 2021-09-14
Attending: STUDENT IN AN ORGANIZED HEALTH CARE EDUCATION/TRAINING PROGRAM | Admitting: INTERNAL MEDICINE
Payer: MEDICAID

## 2021-09-13 VITALS — BODY MASS INDEX: 31.89 KG/M2 | HEIGHT: 61.5 IN | WEIGHT: 171.08 LBS

## 2021-09-13 DIAGNOSIS — Z90.710: ICD-10-CM

## 2021-09-13 DIAGNOSIS — Z91.19: ICD-10-CM

## 2021-09-13 DIAGNOSIS — J96.01: ICD-10-CM

## 2021-09-13 DIAGNOSIS — F10.10: ICD-10-CM

## 2021-09-13 DIAGNOSIS — Z88.8: ICD-10-CM

## 2021-09-13 DIAGNOSIS — J44.9: ICD-10-CM

## 2021-09-13 DIAGNOSIS — I42.8: ICD-10-CM

## 2021-09-13 DIAGNOSIS — Z20.822: ICD-10-CM

## 2021-09-13 DIAGNOSIS — I50.23: ICD-10-CM

## 2021-09-13 DIAGNOSIS — I16.1: ICD-10-CM

## 2021-09-13 DIAGNOSIS — I21.A1: ICD-10-CM

## 2021-09-13 DIAGNOSIS — E03.9: ICD-10-CM

## 2021-09-13 DIAGNOSIS — F15.10: ICD-10-CM

## 2021-09-13 DIAGNOSIS — Z72.0: ICD-10-CM

## 2021-09-13 DIAGNOSIS — E11.65: ICD-10-CM

## 2021-09-13 DIAGNOSIS — E78.5: ICD-10-CM

## 2021-09-13 DIAGNOSIS — E11.40: ICD-10-CM

## 2021-09-13 DIAGNOSIS — Z79.4: ICD-10-CM

## 2021-09-13 DIAGNOSIS — I11.0: Primary | ICD-10-CM

## 2021-09-13 DIAGNOSIS — Z90.49: ICD-10-CM

## 2021-09-13 DIAGNOSIS — Z66: ICD-10-CM

## 2021-09-13 DIAGNOSIS — Z53.29: ICD-10-CM

## 2021-09-13 LAB
ALBUMIN SERPL-MCNC: 2.9 G/DL (ref 3.4–5)
ALP SERPL-CCNC: 81 U/L (ref 45–117)
ALT SERPL-CCNC: 19 U/L (ref 12–78)
ANION GAP SERPL CALC-SCNC: 6 MMOL/L (ref 5–15)
BASOPHILS # BLD AUTO: 0.1 X10^3/UL (ref 0–0.1)
BASOPHILS NFR BLD AUTO: 1 % (ref 0–1)
BILIRUB SERPL-MCNC: 0.2 MG/DL (ref 0.2–1)
CALCIUM SERPL-MCNC: 8.1 MG/DL (ref 8.5–10.1)
CHLORIDE SERPL-SCNC: 102 MMOL/L (ref 98–107)
CREAT SERPL-MCNC: 0.83 MG/DL (ref 0.55–1.02)
EOSINOPHIL # BLD AUTO: 0.1 X10^3/UL (ref 0–0.4)
EOSINOPHIL NFR BLD AUTO: 1 % (ref 1–7)
ERYTHROCYTE [DISTWIDTH] IN BLOOD BY AUTOMATED COUNT: 14.4 % (ref 9.6–15.2)
LYMPHOCYTES # BLD AUTO: 2.9 X10^3/UL (ref 1–3.4)
LYMPHOCYTES NFR BLD AUTO: 45 % (ref 22–44)
MCH RBC QN AUTO: 30 PG (ref 27–34.8)
MCHC RBC AUTO-ENTMCNC: 33.6 G/DL (ref 32.4–35.8)
MONOCYTES # BLD AUTO: 0.6 X10^3/UL (ref 0.2–0.8)
MONOCYTES NFR BLD AUTO: 9 % (ref 2–9)
NEUTROPHILS # BLD AUTO: 2.9 X10^3/UL (ref 1.8–6.8)
NEUTROPHILS NFR BLD AUTO: 44 % (ref 42–75)
PLATELET # BLD AUTO: 382 X10^3/UL (ref 130–400)
PMV BLD AUTO: 7.4 FL (ref 7.4–10.4)
PROT SERPL-MCNC: 7.8 G/DL (ref 6.4–8.2)
RBC # BLD AUTO: 4.93 X10^6/UL (ref 3.82–5.3)
TROPONIN I SERPL-MCNC: 0.17 NG/ML (ref 0–0.04)

## 2021-09-13 PROCEDURE — 80053 COMPREHEN METABOLIC PANEL: CPT

## 2021-09-13 PROCEDURE — 80307 DRUG TEST PRSMV CHEM ANLYZR: CPT

## 2021-09-13 PROCEDURE — 71275 CT ANGIOGRAPHY CHEST: CPT

## 2021-09-13 PROCEDURE — 83036 HEMOGLOBIN GLYCOSYLATED A1C: CPT

## 2021-09-13 PROCEDURE — 85025 COMPLETE CBC W/AUTO DIFF WBC: CPT

## 2021-09-13 PROCEDURE — 96375 TX/PRO/DX INJ NEW DRUG ADDON: CPT

## 2021-09-13 PROCEDURE — 93005 ELECTROCARDIOGRAM TRACING: CPT

## 2021-09-13 PROCEDURE — 99291 CRITICAL CARE FIRST HOUR: CPT

## 2021-09-13 PROCEDURE — 71045 X-RAY EXAM CHEST 1 VIEW: CPT

## 2021-09-13 PROCEDURE — 84484 ASSAY OF TROPONIN QUANT: CPT

## 2021-09-13 PROCEDURE — 83880 ASSAY OF NATRIURETIC PEPTIDE: CPT

## 2021-09-13 PROCEDURE — 82962 GLUCOSE BLOOD TEST: CPT

## 2021-09-13 PROCEDURE — 36415 COLL VENOUS BLD VENIPUNCTURE: CPT

## 2021-09-13 PROCEDURE — 84443 ASSAY THYROID STIM HORMONE: CPT

## 2021-09-13 PROCEDURE — U0003 INFECTIOUS AGENT DETECTION BY NUCLEIC ACID (DNA OR RNA); SEVERE ACUTE RESPIRATORY SYNDROME CORONAVIRUS 2 (SARS-COV-2) (CORONAVIRUS DISEASE [COVID-19]), AMPLIFIED PROBE TECHNIQUE, MAKING USE OF HIGH THROUGHPUT TECHNOLOGIES AS DESCRIBED BY CMS-2020-01-R: HCPCS

## 2021-09-13 PROCEDURE — 96374 THER/PROPH/DIAG INJ IV PUSH: CPT

## 2021-09-14 VITALS — DIASTOLIC BLOOD PRESSURE: 105 MMHG | SYSTOLIC BLOOD PRESSURE: 140 MMHG

## 2021-09-14 VITALS — SYSTOLIC BLOOD PRESSURE: 161 MMHG | DIASTOLIC BLOOD PRESSURE: 125 MMHG

## 2021-09-14 LAB
EST. AVERAGE GLUCOSE BLD GHB EST-MCNC: 240 MG/DL (ref 0–126)
TROPONIN I SERPL-MCNC: 0.17 NG/ML (ref 0–0.04)

## 2021-09-14 RX ADMIN — INSULIN LISPRO SCH UNITS: 100 INJECTION, SOLUTION INTRAVENOUS; SUBCUTANEOUS at 08:54

## 2021-09-14 RX ADMIN — INSULIN LISPRO SCH UNITS: 100 INJECTION, SOLUTION INTRAVENOUS; SUBCUTANEOUS at 02:13

## 2021-09-24 ENCOUNTER — HOSPITAL ENCOUNTER (EMERGENCY)
Dept: HOSPITAL 8 - ED | Age: 51
Discharge: HOME | End: 2021-09-24
Payer: MEDICAID

## 2021-09-24 VITALS — BODY MASS INDEX: 33.09 KG/M2 | HEIGHT: 61 IN | WEIGHT: 175.27 LBS

## 2021-09-24 VITALS — SYSTOLIC BLOOD PRESSURE: 168 MMHG | DIASTOLIC BLOOD PRESSURE: 101 MMHG

## 2021-09-24 DIAGNOSIS — F15.129: ICD-10-CM

## 2021-09-24 DIAGNOSIS — F17.200: ICD-10-CM

## 2021-09-24 DIAGNOSIS — I11.0: ICD-10-CM

## 2021-09-24 DIAGNOSIS — I25.2: ICD-10-CM

## 2021-09-24 DIAGNOSIS — E78.00: ICD-10-CM

## 2021-09-24 DIAGNOSIS — J44.9: Primary | ICD-10-CM

## 2021-09-24 DIAGNOSIS — I50.9: ICD-10-CM

## 2021-09-24 DIAGNOSIS — I42.9: ICD-10-CM

## 2021-09-24 DIAGNOSIS — E11.9: ICD-10-CM

## 2021-09-24 DIAGNOSIS — E78.5: ICD-10-CM

## 2021-09-24 DIAGNOSIS — E03.9: ICD-10-CM

## 2021-09-24 DIAGNOSIS — R00.0: ICD-10-CM

## 2021-09-24 DIAGNOSIS — R06.00: ICD-10-CM

## 2021-09-24 LAB
ALBUMIN SERPL-MCNC: 3 G/DL (ref 3.4–5)
ALP SERPL-CCNC: 79 U/L (ref 45–117)
ALT SERPL-CCNC: 17 U/L (ref 12–78)
ANION GAP SERPL CALC-SCNC: 6 MMOL/L (ref 5–15)
BASOPHILS # BLD AUTO: 0.1 X10^3/UL (ref 0–0.1)
BASOPHILS NFR BLD AUTO: 1 % (ref 0–1)
BILIRUB SERPL-MCNC: 0.4 MG/DL (ref 0.2–1)
CALCIUM SERPL-MCNC: 8.2 MG/DL (ref 8.5–10.1)
CHLORIDE SERPL-SCNC: 102 MMOL/L (ref 98–107)
CREAT SERPL-MCNC: 0.79 MG/DL (ref 0.55–1.02)
EOSINOPHIL # BLD AUTO: 0.1 X10^3/UL (ref 0–0.4)
EOSINOPHIL NFR BLD AUTO: 1 % (ref 1–7)
ERYTHROCYTE [DISTWIDTH] IN BLOOD BY AUTOMATED COUNT: 14.5 % (ref 9.6–15.2)
LYMPHOCYTES # BLD AUTO: 2 X10^3/UL (ref 1–3.4)
LYMPHOCYTES NFR BLD AUTO: 26 % (ref 22–44)
MCH RBC QN AUTO: 30 PG (ref 27–34.8)
MCHC RBC AUTO-ENTMCNC: 33.6 G/DL (ref 32.4–35.8)
MONOCYTES # BLD AUTO: 0.6 X10^3/UL (ref 0.2–0.8)
MONOCYTES NFR BLD AUTO: 7 % (ref 2–9)
NEUTROPHILS # BLD AUTO: 4.9 X10^3/UL (ref 1.8–6.8)
NEUTROPHILS NFR BLD AUTO: 64 % (ref 42–75)
PLATELET # BLD AUTO: 286 X10^3/UL (ref 130–400)
PMV BLD AUTO: 7.6 FL (ref 7.4–10.4)
PROT SERPL-MCNC: 7.9 G/DL (ref 6.4–8.2)
RBC # BLD AUTO: 5.13 X10^6/UL (ref 3.82–5.3)
TROPONIN I SERPL-MCNC: 0.19 NG/ML (ref 0–0.04)

## 2021-09-24 PROCEDURE — 83880 ASSAY OF NATRIURETIC PEPTIDE: CPT

## 2021-09-24 PROCEDURE — 71045 X-RAY EXAM CHEST 1 VIEW: CPT

## 2021-09-24 PROCEDURE — 93005 ELECTROCARDIOGRAM TRACING: CPT

## 2021-09-24 PROCEDURE — 84484 ASSAY OF TROPONIN QUANT: CPT

## 2021-09-24 PROCEDURE — 36415 COLL VENOUS BLD VENIPUNCTURE: CPT

## 2021-09-24 PROCEDURE — 80053 COMPREHEN METABOLIC PANEL: CPT

## 2021-09-24 PROCEDURE — 85025 COMPLETE CBC W/AUTO DIFF WBC: CPT

## 2021-09-24 PROCEDURE — 99285 EMERGENCY DEPT VISIT HI MDM: CPT

## 2021-10-25 ENCOUNTER — APPOINTMENT (OUTPATIENT)
Dept: RADIOLOGY | Facility: MEDICAL CENTER | Age: 51
End: 2021-10-25
Attending: EMERGENCY MEDICINE
Payer: MEDICAID

## 2021-10-25 ENCOUNTER — OFFICE VISIT (OUTPATIENT)
Dept: MEDICAL GROUP | Facility: CLINIC | Age: 51
End: 2021-10-25
Payer: MEDICAID

## 2021-10-25 VITALS
BODY MASS INDEX: 34.74 KG/M2 | OXYGEN SATURATION: 90 % | WEIGHT: 184 LBS | DIASTOLIC BLOOD PRESSURE: 100 MMHG | HEART RATE: 117 BPM | HEIGHT: 61 IN | TEMPERATURE: 46.2 F | RESPIRATION RATE: 22 BRPM | SYSTOLIC BLOOD PRESSURE: 163 MMHG

## 2021-10-25 DIAGNOSIS — J96.01 ACUTE RESPIRATORY FAILURE WITH HYPOXIA (HCC): ICD-10-CM

## 2021-10-25 DIAGNOSIS — I50.9 HEART FAILURE, UNSPECIFIED HF CHRONICITY, UNSPECIFIED HEART FAILURE TYPE (HCC): ICD-10-CM

## 2021-10-25 DIAGNOSIS — G62.89 OTHER POLYNEUROPATHY: ICD-10-CM

## 2021-10-25 DIAGNOSIS — I10 HYPERTENSION, UNSPECIFIED TYPE: ICD-10-CM

## 2021-10-25 PROBLEM — I50.1 LEFT HEART FAILURE (HCC): Status: ACTIVE | Noted: 2021-04-30

## 2021-10-25 PROBLEM — G62.9 PERIPHERAL NEUROPATHY: Status: ACTIVE | Noted: 2021-04-30

## 2021-10-25 PROBLEM — J81.1 PULMONARY EDEMA: Status: ACTIVE | Noted: 2021-04-30

## 2021-10-25 PROBLEM — I34.0 MITRAL VALVE INSUFFICIENCY: Status: ACTIVE | Noted: 2021-04-30

## 2021-10-25 PROBLEM — F17.200 TOBACCO DEPENDENCE: Status: ACTIVE | Noted: 2021-04-30

## 2021-10-25 PROBLEM — E66.9 OBESITY: Status: ACTIVE | Noted: 2021-04-30

## 2021-10-25 PROBLEM — F10.10 ALCOHOL ABUSE: Status: ACTIVE | Noted: 2021-04-30

## 2021-10-25 PROBLEM — J44.9 CHRONIC OBSTRUCTIVE PULMONARY DISEASE (HCC): Status: ACTIVE | Noted: 2021-04-30

## 2021-10-25 PROBLEM — E78.5 HYPERLIPIDEMIA: Status: ACTIVE | Noted: 2021-04-30

## 2021-10-25 PROBLEM — F15.20 METHAMPHETAMINE DEPENDENCE (HCC): Status: ACTIVE | Noted: 2021-04-30

## 2021-10-25 PROBLEM — E11.9 TYPE 2 DIABETES MELLITUS WITHOUT COMPLICATIONS (HCC): Status: ACTIVE | Noted: 2021-04-30

## 2021-10-25 PROBLEM — E07.9 THYROID DISORDER: Status: ACTIVE | Noted: 2021-04-30

## 2021-10-25 PROBLEM — R79.89 ABNORMAL LIVER FUNCTION TESTS: Status: ACTIVE | Noted: 2021-04-30

## 2021-10-25 LAB
ALBUMIN SERPL BCP-MCNC: 3.6 G/DL (ref 3.2–4.9)
ALBUMIN/GLOB SERPL: 1 G/DL
ALP SERPL-CCNC: 145 U/L (ref 30–99)
ALT SERPL-CCNC: 94 U/L (ref 2–50)
ANION GAP SERPL CALC-SCNC: 12 MMOL/L (ref 7–16)
AST SERPL-CCNC: 57 U/L (ref 12–45)
BILIRUB SERPL-MCNC: 0.2 MG/DL (ref 0.1–1.5)
BUN SERPL-MCNC: 13 MG/DL (ref 8–22)
CALCIUM SERPL-MCNC: 8.3 MG/DL (ref 8.5–10.5)
CHLORIDE SERPL-SCNC: 101 MMOL/L (ref 96–112)
CO2 SERPL-SCNC: 25 MMOL/L (ref 20–33)
CREAT SERPL-MCNC: 0.83 MG/DL (ref 0.5–1.4)
EKG IMPRESSION: NORMAL
EKG IMPRESSION: NORMAL
GLOBULIN SER CALC-MCNC: 3.6 G/DL (ref 1.9–3.5)
GLUCOSE SERPL-MCNC: 295 MG/DL (ref 65–99)
POTASSIUM SERPL-SCNC: 3.8 MMOL/L (ref 3.6–5.5)
PROT SERPL-MCNC: 7.2 G/DL (ref 6–8.2)
SODIUM SERPL-SCNC: 138 MMOL/L (ref 135–145)
TROPONIN T SERPL-MCNC: 32 NG/L (ref 6–19)

## 2021-10-25 PROCEDURE — 85025 COMPLETE CBC W/AUTO DIFF WBC: CPT

## 2021-10-25 PROCEDURE — 84484 ASSAY OF TROPONIN QUANT: CPT

## 2021-10-25 PROCEDURE — 93005 ELECTROCARDIOGRAM TRACING: CPT | Performed by: EMERGENCY MEDICINE

## 2021-10-25 PROCEDURE — 71045 X-RAY EXAM CHEST 1 VIEW: CPT

## 2021-10-25 PROCEDURE — 80053 COMPREHEN METABOLIC PANEL: CPT

## 2021-10-25 PROCEDURE — 99215 OFFICE O/P EST HI 40 MIN: CPT | Mod: GC | Performed by: STUDENT IN AN ORGANIZED HEALTH CARE EDUCATION/TRAINING PROGRAM

## 2021-10-25 PROCEDURE — 93005 ELECTROCARDIOGRAM TRACING: CPT

## 2021-10-25 PROCEDURE — 99285 EMERGENCY DEPT VISIT HI MDM: CPT

## 2021-10-25 RX ORDER — ASPIRIN 81 MG/1
1 TABLET ORAL DAILY
COMMUNITY
Start: 2021-04-30 | End: 2021-11-12 | Stop reason: SDUPTHER

## 2021-10-25 RX ORDER — GABAPENTIN 400 MG/1
400 CAPSULE ORAL 3 TIMES DAILY
COMMUNITY
Start: 2021-04-30 | End: 2021-10-26

## 2021-10-25 RX ORDER — CARVEDILOL 6.25 MG/1
6.25 TABLET ORAL DAILY
COMMUNITY
Start: 2021-10-21 | End: 2021-11-12 | Stop reason: SDUPTHER

## 2021-10-25 RX ORDER — SPIRONOLACTONE 25 MG/1
12.5 TABLET ORAL DAILY
Qty: 15 TABLET | Refills: 5 | Status: SHIPPED | OUTPATIENT
Start: 2021-10-25 | End: 2021-10-26

## 2021-10-25 RX ORDER — SIMVASTATIN 40 MG
1 TABLET ORAL DAILY
COMMUNITY
Start: 2021-04-30 | End: 2021-11-12 | Stop reason: SDUPTHER

## 2021-10-25 RX ORDER — FUROSEMIDE 20 MG/1
20 TABLET ORAL 2 TIMES DAILY
Status: ON HOLD | COMMUNITY
Start: 2021-10-21 | End: 2021-10-27 | Stop reason: SDUPTHER

## 2021-10-25 RX ORDER — ALBUTEROL SULFATE 90 UG/1
2 AEROSOL, METERED RESPIRATORY (INHALATION) EVERY 6 HOURS PRN
COMMUNITY
Start: 2021-05-09 | End: 2021-11-12 | Stop reason: SDUPTHER

## 2021-10-25 RX ORDER — LISINOPRIL 40 MG/1
1 TABLET ORAL DAILY
COMMUNITY
Start: 2021-04-30 | End: 2021-11-12 | Stop reason: SDUPTHER

## 2021-10-25 RX ORDER — LEVOTHYROXINE SODIUM 0.05 MG/1
50 TABLET ORAL DAILY
COMMUNITY
Start: 2021-04-30 | End: 2021-11-12 | Stop reason: SDUPTHER

## 2021-10-26 ENCOUNTER — HOSPITAL ENCOUNTER (OUTPATIENT)
Facility: MEDICAL CENTER | Age: 51
End: 2021-10-27
Attending: EMERGENCY MEDICINE | Admitting: INTERNAL MEDICINE
Payer: MEDICAID

## 2021-10-26 DIAGNOSIS — I50.1 LEFT HEART FAILURE (HCC): ICD-10-CM

## 2021-10-26 DIAGNOSIS — I50.9 ACUTE ON CHRONIC CONGESTIVE HEART FAILURE, UNSPECIFIED HEART FAILURE TYPE (HCC): ICD-10-CM

## 2021-10-26 LAB
ALBUMIN SERPL BCP-MCNC: 3.4 G/DL (ref 3.2–4.9)
ALBUMIN/GLOB SERPL: 1.1 G/DL
ALP SERPL-CCNC: 142 U/L (ref 30–99)
ALT SERPL-CCNC: 83 U/L (ref 2–50)
AMPHET UR QL SCN: POSITIVE
ANION GAP SERPL CALC-SCNC: 9 MMOL/L (ref 7–16)
AST SERPL-CCNC: 53 U/L (ref 12–45)
BARBITURATES UR QL SCN: NEGATIVE
BASOPHILS # BLD AUTO: 0.5 % (ref 0–1.8)
BASOPHILS # BLD: 0.04 K/UL (ref 0–0.12)
BENZODIAZ UR QL SCN: NEGATIVE
BILIRUB SERPL-MCNC: 0.2 MG/DL (ref 0.1–1.5)
BUN SERPL-MCNC: 13 MG/DL (ref 8–22)
BZE UR QL SCN: NEGATIVE
CALCIUM SERPL-MCNC: 8.5 MG/DL (ref 8.5–10.5)
CANNABINOIDS UR QL SCN: NEGATIVE
CHLORIDE SERPL-SCNC: 102 MMOL/L (ref 96–112)
CO2 SERPL-SCNC: 27 MMOL/L (ref 20–33)
CREAT SERPL-MCNC: 0.86 MG/DL (ref 0.5–1.4)
EOSINOPHIL # BLD AUTO: 0.08 K/UL (ref 0–0.51)
EOSINOPHIL NFR BLD: 1 % (ref 0–6.9)
ERYTHROCYTE [DISTWIDTH] IN BLOOD BY AUTOMATED COUNT: 43 FL (ref 35.9–50)
FLUAV RNA SPEC QL NAA+PROBE: NEGATIVE
FLUBV RNA SPEC QL NAA+PROBE: NEGATIVE
GLOBULIN SER CALC-MCNC: 3 G/DL (ref 1.9–3.5)
GLUCOSE BLD-MCNC: 272 MG/DL (ref 65–99)
GLUCOSE BLD-MCNC: 275 MG/DL (ref 65–99)
GLUCOSE BLD-MCNC: 296 MG/DL (ref 65–99)
GLUCOSE SERPL-MCNC: 279 MG/DL (ref 65–99)
HCT VFR BLD AUTO: 42.3 % (ref 37–47)
HGB BLD-MCNC: 13.6 G/DL (ref 12–16)
IMM GRANULOCYTES # BLD AUTO: 0.03 K/UL (ref 0–0.11)
IMM GRANULOCYTES NFR BLD AUTO: 0.4 % (ref 0–0.9)
LYMPHOCYTES # BLD AUTO: 2.36 K/UL (ref 1–4.8)
LYMPHOCYTES NFR BLD: 28.1 % (ref 22–41)
MAGNESIUM SERPL-MCNC: 1.5 MG/DL (ref 1.5–2.5)
MCH RBC QN AUTO: 28.2 PG (ref 27–33)
MCHC RBC AUTO-ENTMCNC: 32.2 G/DL (ref 33.6–35)
MCV RBC AUTO: 87.8 FL (ref 81.4–97.8)
METHADONE UR QL SCN: NEGATIVE
MONOCYTES # BLD AUTO: 0.83 K/UL (ref 0–0.85)
MONOCYTES NFR BLD AUTO: 9.9 % (ref 0–13.4)
NEUTROPHILS # BLD AUTO: 5.07 K/UL (ref 2–7.15)
NEUTROPHILS NFR BLD: 60.1 % (ref 44–72)
NRBC # BLD AUTO: 0 K/UL
NRBC BLD-RTO: 0 /100 WBC
NT-PROBNP SERPL IA-MCNC: 1654 PG/ML (ref 0–125)
NT-PROBNP SERPL IA-MCNC: 1769 PG/ML (ref 0–125)
OPIATES UR QL SCN: NEGATIVE
OXYCODONE UR QL SCN: NEGATIVE
PCP UR QL SCN: NEGATIVE
PLATELET # BLD AUTO: 341 K/UL (ref 164–446)
PMV BLD AUTO: 9.3 FL (ref 9–12.9)
POTASSIUM SERPL-SCNC: 3.6 MMOL/L (ref 3.6–5.5)
PROPOXYPH UR QL SCN: NEGATIVE
PROT SERPL-MCNC: 6.4 G/DL (ref 6–8.2)
RBC # BLD AUTO: 4.82 M/UL (ref 4.2–5.4)
RSV RNA SPEC QL NAA+PROBE: NEGATIVE
SARS-COV-2 RNA RESP QL NAA+PROBE: NOTDETECTED
SODIUM SERPL-SCNC: 138 MMOL/L (ref 135–145)
SPECIMEN SOURCE: NORMAL
TROPONIN T SERPL-MCNC: 37 NG/L (ref 6–19)
WBC # BLD AUTO: 8.4 K/UL (ref 4.8–10.8)

## 2021-10-26 PROCEDURE — 96372 THER/PROPH/DIAG INJ SC/IM: CPT | Mod: XU

## 2021-10-26 PROCEDURE — 700111 HCHG RX REV CODE 636 W/ 250 OVERRIDE (IP): Performed by: INTERNAL MEDICINE

## 2021-10-26 PROCEDURE — 80053 COMPREHEN METABOLIC PANEL: CPT

## 2021-10-26 PROCEDURE — 83880 ASSAY OF NATRIURETIC PEPTIDE: CPT

## 2021-10-26 PROCEDURE — 90686 IIV4 VACC NO PRSV 0.5 ML IM: CPT | Performed by: INTERNAL MEDICINE

## 2021-10-26 PROCEDURE — 80307 DRUG TEST PRSMV CHEM ANLYZR: CPT

## 2021-10-26 PROCEDURE — 700111 HCHG RX REV CODE 636 W/ 250 OVERRIDE (IP): Performed by: EMERGENCY MEDICINE

## 2021-10-26 PROCEDURE — A9270 NON-COVERED ITEM OR SERVICE: HCPCS | Performed by: INTERNAL MEDICINE

## 2021-10-26 PROCEDURE — 96375 TX/PRO/DX INJ NEW DRUG ADDON: CPT

## 2021-10-26 PROCEDURE — 82962 GLUCOSE BLOOD TEST: CPT

## 2021-10-26 PROCEDURE — 90471 IMMUNIZATION ADMIN: CPT

## 2021-10-26 PROCEDURE — A9270 NON-COVERED ITEM OR SERVICE: HCPCS | Performed by: EMERGENCY MEDICINE

## 2021-10-26 PROCEDURE — 700102 HCHG RX REV CODE 250 W/ 637 OVERRIDE(OP): Performed by: EMERGENCY MEDICINE

## 2021-10-26 PROCEDURE — 96376 TX/PRO/DX INJ SAME DRUG ADON: CPT

## 2021-10-26 PROCEDURE — 96374 THER/PROPH/DIAG INJ IV PUSH: CPT

## 2021-10-26 PROCEDURE — 90732 PPSV23 VACC 2 YRS+ SUBQ/IM: CPT | Performed by: INTERNAL MEDICINE

## 2021-10-26 PROCEDURE — 0241U HCHG SARS-COV-2 COVID-19 NFCT DS RESP RNA 4 TRGT MIC: CPT

## 2021-10-26 PROCEDURE — 700111 HCHG RX REV CODE 636 W/ 250 OVERRIDE (IP)

## 2021-10-26 PROCEDURE — 84484 ASSAY OF TROPONIN QUANT: CPT

## 2021-10-26 PROCEDURE — G0378 HOSPITAL OBSERVATION PER HR: HCPCS

## 2021-10-26 PROCEDURE — 99220 PR INITIAL OBSERVATION CARE,LEVL III: CPT | Performed by: INTERNAL MEDICINE

## 2021-10-26 PROCEDURE — 700102 HCHG RX REV CODE 250 W/ 637 OVERRIDE(OP): Performed by: INTERNAL MEDICINE

## 2021-10-26 PROCEDURE — 83735 ASSAY OF MAGNESIUM: CPT

## 2021-10-26 RX ORDER — ACETAMINOPHEN 325 MG/1
650 TABLET ORAL EVERY 6 HOURS PRN
Status: DISCONTINUED | OUTPATIENT
Start: 2021-10-26 | End: 2021-10-27 | Stop reason: HOSPADM

## 2021-10-26 RX ORDER — NITROGLYCERIN 0.4 MG/1
0.4 TABLET SUBLINGUAL ONCE
Status: COMPLETED | OUTPATIENT
Start: 2021-10-26 | End: 2021-10-26

## 2021-10-26 RX ORDER — SIMVASTATIN 20 MG
40 TABLET ORAL DAILY
Status: DISCONTINUED | OUTPATIENT
Start: 2021-10-26 | End: 2021-10-27 | Stop reason: HOSPADM

## 2021-10-26 RX ORDER — AMOXICILLIN 250 MG
2 CAPSULE ORAL 2 TIMES DAILY
Status: DISCONTINUED | OUTPATIENT
Start: 2021-10-26 | End: 2021-10-27 | Stop reason: HOSPADM

## 2021-10-26 RX ORDER — POLYETHYLENE GLYCOL 3350 17 G/17G
1 POWDER, FOR SOLUTION ORAL
Status: DISCONTINUED | OUTPATIENT
Start: 2021-10-26 | End: 2021-10-27 | Stop reason: HOSPADM

## 2021-10-26 RX ORDER — FUROSEMIDE 10 MG/ML
40 INJECTION INTRAMUSCULAR; INTRAVENOUS
Status: DISCONTINUED | OUTPATIENT
Start: 2021-10-26 | End: 2021-10-26

## 2021-10-26 RX ORDER — LABETALOL HYDROCHLORIDE 5 MG/ML
10 INJECTION, SOLUTION INTRAVENOUS EVERY 4 HOURS PRN
Status: DISCONTINUED | OUTPATIENT
Start: 2021-10-26 | End: 2021-10-27 | Stop reason: HOSPADM

## 2021-10-26 RX ORDER — FUROSEMIDE 10 MG/ML
40 INJECTION INTRAMUSCULAR; INTRAVENOUS 3 TIMES DAILY
Status: DISCONTINUED | OUTPATIENT
Start: 2021-10-26 | End: 2021-10-27 | Stop reason: HOSPADM

## 2021-10-26 RX ORDER — SPIRONOLACTONE 25 MG/1
12.5 TABLET ORAL DAILY
Status: DISCONTINUED | OUTPATIENT
Start: 2021-10-26 | End: 2021-10-26

## 2021-10-26 RX ORDER — CARVEDILOL 6.25 MG/1
6.25 TABLET ORAL 2 TIMES DAILY
Status: DISCONTINUED | OUTPATIENT
Start: 2021-10-26 | End: 2021-10-27 | Stop reason: HOSPADM

## 2021-10-26 RX ORDER — GABAPENTIN 400 MG/1
400 CAPSULE ORAL 3 TIMES DAILY
Status: DISCONTINUED | OUTPATIENT
Start: 2021-10-26 | End: 2021-10-26

## 2021-10-26 RX ORDER — ENALAPRILAT 1.25 MG/ML
1.25 INJECTION INTRAVENOUS EVERY 6 HOURS PRN
Status: DISCONTINUED | OUTPATIENT
Start: 2021-10-26 | End: 2021-10-27 | Stop reason: HOSPADM

## 2021-10-26 RX ORDER — ALBUTEROL SULFATE 90 UG/1
2 AEROSOL, METERED RESPIRATORY (INHALATION) EVERY 4 HOURS PRN
Status: DISCONTINUED | OUTPATIENT
Start: 2021-10-26 | End: 2021-10-27 | Stop reason: HOSPADM

## 2021-10-26 RX ORDER — DEXTROSE MONOHYDRATE 25 G/50ML
50 INJECTION, SOLUTION INTRAVENOUS
Status: DISCONTINUED | OUTPATIENT
Start: 2021-10-26 | End: 2021-10-27 | Stop reason: HOSPADM

## 2021-10-26 RX ORDER — CLONIDINE HYDROCHLORIDE 0.1 MG/1
0.1 TABLET ORAL EVERY 6 HOURS PRN
Status: DISCONTINUED | OUTPATIENT
Start: 2021-10-26 | End: 2021-10-27 | Stop reason: HOSPADM

## 2021-10-26 RX ORDER — POTASSIUM CHLORIDE 20 MEQ/1
20 TABLET, EXTENDED RELEASE ORAL 2 TIMES DAILY
Status: DISCONTINUED | OUTPATIENT
Start: 2021-10-26 | End: 2021-10-26

## 2021-10-26 RX ORDER — BISACODYL 10 MG
10 SUPPOSITORY, RECTAL RECTAL
Status: DISCONTINUED | OUTPATIENT
Start: 2021-10-26 | End: 2021-10-27 | Stop reason: HOSPADM

## 2021-10-26 RX ORDER — LEVOTHYROXINE SODIUM 0.05 MG/1
50 TABLET ORAL DAILY
Status: DISCONTINUED | OUTPATIENT
Start: 2021-10-26 | End: 2021-10-27 | Stop reason: HOSPADM

## 2021-10-26 RX ORDER — CYCLOBENZAPRINE HCL 5 MG
5 TABLET ORAL 3 TIMES DAILY PRN
Status: DISCONTINUED | OUTPATIENT
Start: 2021-10-26 | End: 2021-10-27 | Stop reason: HOSPADM

## 2021-10-26 RX ORDER — LISINOPRIL 20 MG/1
40 TABLET ORAL DAILY
Status: DISCONTINUED | OUTPATIENT
Start: 2021-10-26 | End: 2021-10-27 | Stop reason: HOSPADM

## 2021-10-26 RX ORDER — POTASSIUM CHLORIDE 20 MEQ/1
40 TABLET, EXTENDED RELEASE ORAL 2 TIMES DAILY
Status: DISCONTINUED | OUTPATIENT
Start: 2021-10-27 | End: 2021-10-27 | Stop reason: HOSPADM

## 2021-10-26 RX ORDER — MAGNESIUM SULFATE HEPTAHYDRATE 40 MG/ML
4 INJECTION, SOLUTION INTRAVENOUS ONCE
Status: COMPLETED | OUTPATIENT
Start: 2021-10-26 | End: 2021-10-26

## 2021-10-26 RX ORDER — FUROSEMIDE 10 MG/ML
40 INJECTION INTRAMUSCULAR; INTRAVENOUS ONCE
Status: COMPLETED | OUTPATIENT
Start: 2021-10-26 | End: 2021-10-26

## 2021-10-26 RX ADMIN — LEVOTHYROXINE SODIUM 50 MCG: 0.05 TABLET ORAL at 05:36

## 2021-10-26 RX ADMIN — LISINOPRIL 40 MG: 20 TABLET ORAL at 05:36

## 2021-10-26 RX ADMIN — CYCLOBENZAPRINE 5 MG: 10 TABLET, FILM COATED ORAL at 02:07

## 2021-10-26 RX ADMIN — CARVEDILOL 6.25 MG: 6.25 TABLET, FILM COATED ORAL at 17:21

## 2021-10-26 RX ADMIN — POTASSIUM CHLORIDE 20 MEQ: 1500 TABLET, EXTENDED RELEASE ORAL at 17:21

## 2021-10-26 RX ADMIN — INSULIN HUMAN 3 UNITS: 100 INJECTION, SOLUTION PARENTERAL at 10:32

## 2021-10-26 RX ADMIN — FUROSEMIDE 40 MG: 10 INJECTION, SOLUTION INTRAMUSCULAR; INTRAVENOUS at 05:35

## 2021-10-26 RX ADMIN — FUROSEMIDE 40 MG: 10 INJECTION, SOLUTION INTRAMUSCULAR; INTRAVENOUS at 01:54

## 2021-10-26 RX ADMIN — NITROGLYCERIN 0.4 MG: 0.4 TABLET, ORALLY DISINTEGRATING SUBLINGUAL at 01:55

## 2021-10-26 RX ADMIN — ASPIRIN 81 MG: 81 TABLET, COATED ORAL at 05:35

## 2021-10-26 RX ADMIN — FUROSEMIDE 40 MG: 10 INJECTION, SOLUTION INTRAMUSCULAR; INTRAVENOUS at 20:41

## 2021-10-26 RX ADMIN — MAGNESIUM SULFATE HEPTAHYDRATE 4 G: 40 INJECTION, SOLUTION INTRAVENOUS at 08:16

## 2021-10-26 RX ADMIN — INSULIN HUMAN 3 UNITS: 100 INJECTION, SOLUTION PARENTERAL at 17:22

## 2021-10-26 RX ADMIN — POTASSIUM CHLORIDE 20 MEQ: 1500 TABLET, EXTENDED RELEASE ORAL at 05:36

## 2021-10-26 RX ADMIN — ENOXAPARIN SODIUM 40 MG: 40 INJECTION SUBCUTANEOUS at 05:37

## 2021-10-26 RX ADMIN — CYCLOBENZAPRINE 5 MG: 10 TABLET, FILM COATED ORAL at 20:39

## 2021-10-26 RX ADMIN — ACETAMINOPHEN 650 MG: 325 TABLET, FILM COATED ORAL at 05:50

## 2021-10-26 RX ADMIN — INSULIN HUMAN 3 UNITS: 100 INJECTION, SOLUTION PARENTERAL at 20:46

## 2021-10-26 RX ADMIN — CARVEDILOL 6.25 MG: 6.25 TABLET, FILM COATED ORAL at 05:36

## 2021-10-26 RX ADMIN — PNEUMOCOCCAL VACCINE POLYVALENT 25 MCG
25; 25; 25; 25; 25; 25; 25; 25; 25; 25; 25; 25; 25; 25; 25; 25; 25; 25; 25; 25; 25; 25; 25 INJECTION, SOLUTION INTRAMUSCULAR; SUBCUTANEOUS at 13:46

## 2021-10-26 RX ADMIN — SIMVASTATIN 40 MG: 20 TABLET, FILM COATED ORAL at 05:36

## 2021-10-26 RX ADMIN — INFLUENZA A VIRUS A/VICTORIA/2570/2019 IVR-215 (H1N1) ANTIGEN (FORMALDEHYDE INACTIVATED), INFLUENZA A VIRUS A/TASMANIA/503/2020 IVR-221 (H3N2) ANTIGEN (FORMALDEHYDE INACTIVATED), INFLUENZA B VIRUS B/PHUKET/3073/2013 ANTIGEN (FORMALDEHYDE INACTIVATED), AND INFLUENZA B VIRUS B/WASHINGTON/02/2019 ANTIGEN (FORMALDEHYDE INACTIVATED) 0.5 ML: 15; 15; 15; 15 INJECTION, SUSPENSION INTRAMUSCULAR at 13:45

## 2021-10-26 RX ADMIN — FUROSEMIDE 40 MG: 10 INJECTION, SOLUTION INTRAMUSCULAR; INTRAVENOUS at 17:22

## 2021-10-26 ASSESSMENT — PAIN DESCRIPTION - PAIN TYPE
TYPE: ACUTE PAIN

## 2021-10-26 ASSESSMENT — ENCOUNTER SYMPTOMS
PALPITATIONS: 0
WEIGHT LOSS: 0
NAUSEA: 0
DEPRESSION: 0
BRUISES/BLEEDS EASILY: 0
INSOMNIA: 0
DIZZINESS: 0
CHILLS: 0
MYALGIAS: 0
COUGH: 0
BLURRED VISION: 0
POLYDIPSIA: 0
DIARRHEA: 0
ABDOMINAL PAIN: 0
SHORTNESS OF BREATH: 1
SORE THROAT: 0
HEARTBURN: 0
HEMOPTYSIS: 0
HEADACHES: 0
NECK PAIN: 0
STRIDOR: 0
COUGH: 1
DOUBLE VISION: 0
VOMITING: 0
FEVER: 0

## 2021-10-26 ASSESSMENT — FIBROSIS 4 INDEX: FIB4 SCORE: 0.84

## 2021-10-26 NOTE — PROGRESS NOTES
[facility] FAMILY MEDICINE OFFICE VISIT    Date: 10/25/2021    MRN: [id number]  Patient ID: Mer Arriaza    SUBJECTIVE:  Mer Arriaza is a 50 y.o. woman who presents to clinic for hospital follow-up.  Patient recently admitted to [facility] where she was discovered to have significant worsening of her heart failure, reportedly with ejection fraction of 30%.  Patient had cardiac catheterization while at [facility] which included a stent per patient report.  Patient attended to add visit by her daughter, who reports that Mer left [facility] AMA due to frustration with limited eye contact by physicians while she was admitted.  Patient reportedly was supposed to leave with home oxygen, however this was not arranged prior to discharge.  Patient has been home since this time without home oxygen.  Patient was seen by [facility] cardiology on 10/21, where medications were adjusted.  Patient reports that since then, she has had increasing volume overload in both her legs and her abdomen, increasing orthopnea, increasing respiratory distress, and increasing peripheral pain with cramping.  Patient has been taking furosemide daily during this time and reports that she has not been taking potassium.  Patient reports that her potassium was borderline low when she was discharged from [facility].  On physician examination, patient noted to be talking in 1-2 word sentences, with frequent pausing for respiratory rations in between.    PMHx/PSHx:  Past Medical History:   Diagnosis Date   • Hypertension    • Psychiatric disorder     multiple personality disorder/bipolar     Past Surgical History:   Procedure Laterality Date   • ABDOMINAL HYSTERECTOMY TOTAL      Hysterectomy, Total Abdominal   • OTHER ORTHOPEDIC SURGERY      right       Allergies: Naltrexone and Nkda [no known drug allergy]    OBJECTIVE:  Vitals:    10/25/21 1711   BP: (!) 163/100   Pulse: (!) 117   Resp: (!) 22   Temp: (!) 7.9 °C  (46.2 °F)   SpO2: 90%       Physical Examination:  General: Unwell and ill-appearing woman in moderate respiratory and anxious distress   HEENT: Normocephalic, atraumatic  Cardiovascular: RRR, no murmurs, gallops, or rubs  Pulmonary: CTAB, tachypnea present, no rales, rhonchi, or wheezes  Abdominal: Obese abdomen, non-tender to palpation, distention present across entirety of abdomen  Extremities: Moves all spontaneously, trace lower extremity edema   neurological: Alert and oriented    ASSESSMENT & PLAN:  Mer Arriaza is a 50 y.o. woman with heart failure (LVEF 30%), ischemic cardiomyopathy, type 2 diabetes, chronic methamphetamine and tobacco use who presents with symptoms indicative of progressive worsening heart failure despite medical treatment.    1. Heart failure, unspecified HF chronicity, unspecified heart failure type (HCC)  spironolactone (ALDACTONE) 25 MG Tab   2. Acute respiratory failure with hypoxia (HCC)     3. Hypertension, unspecified type     4. Other polyneuropathy         Orders Placed This Encounter   • spironolactone (ALDACTONE) 25 MG Tab   • albuterol 108 (90 Base) MCG/ACT Aero Soln inhalation aerosol   • aspirin (ASPIRIN 81) 81 MG EC tablet   • carvedilol (COREG) 6.25 MG Tab   • furosemide (LASIX) 20 MG Tab   • gabapentin (NEURONTIN) 400 MG Cap   • levothyroxine (SYNTHROID) 50 MCG Tab   • lisinopril (PRINIVIL) 40 MG tablet   • simvastatin (ZOCOR) 40 MG Tab   • INCRUSE ELLIPTA 62.5 MCG/INH AEROSOL POWDER, BREATH ACTIVATED   • metFORMIN (GLUCOPHAGE) 500 MG Tab       #Heart failure, unspecified  Unable to review lab/imaging/reports from [facility] at this time.  Patient appears to be in decompensated heart failure, especially given her reported orthopnea, increasing hypoxia, and increasing peripheral volume overload.  Patient left the hospital AMA, which was against best medical decision.  Reviewed medication list from [facility], which did not appear to be medically optimized per  this physician's review.  Advised to Mer that since she has multiple times been to [facility] and has not been medically optimized, that she should seek emergent medical care at [facility] emergency department.  Discussed with patient that should she be admitted, [facility] family medicine team will care for her during her admission.  Advised patient to head there immediately with family to arrange for her transportation.  Patient and family verbalized understanding and agreement with plan of care.    #Acute respiratory failure with hypoxia  Patient noted to be tachypneic, saturating 90% however requiring significant respiratory effort to maintain this.  Sending to emergency room as above.    #Hypertensive  Patient with unspecified hypertension, blood pressure 163/100.  Sent patient to emergency room, will review blood pressure at next visit in outpatient setting for further management.    #Polyneuropathy  Patient reporting significant lower extremity pain, however difficult to determine in the clinical setting whether this is related to hypokalemia, hypocalcemia, peripheral volume overload, or diabetic neuropathy.  Send patient to emergency room as above.  We will review this at next visit.    Manny Araya M.D.  Family Medicine Resident  PGY-3

## 2021-10-26 NOTE — ED NOTES
Med Rec completed: per pt at bedside with visitor present      No ORAL antibiotics in last 30 days    [facility]     Pt confirmed following allergies:  Allergies   Allergen Reactions   • Naltrexone Swelling     All over body   • Nkda [No Known Drug Allergy]       Pt's home medications:   Medication Sig   • albuterol 108 (90 Base) MCG/ACT Aero Soln inhalation aerosol Inhale 2 Puffs every 6 hours as needed for Shortness of Breath.   • aspirin (ASPIRIN 81) 81 MG EC tablet Take 1 Tablet by mouth every day.   • carvedilol (COREG) 6.25 MG Tab Take 1 Tablet by mouth 2 times a day.   • furosemide (LASIX) 20 MG Tab Take 20 mg by mouth 2 times a day.   • levothyroxine (SYNTHROID) 50 MCG Tab Take 50 mcg by mouth every day.   • lisinopril (PRINIVIL) 40 MG tablet Take 1 Tablet by mouth every day.   • simvastatin (ZOCOR) 40 MG Tab Take 1 Tablet by mouth every day.   • INCRUSE ELLIPTA 62.5 MCG/INH AEROSOL POWDER, BREATH ACTIVATED Inhale 1 Ampule every day.   • metFORMIN (GLUCOPHAGE) 500 MG Tab Take 1 Tablet by mouth 2 times a day.     Removed medications:   Medication Removal Reason   • [DISCONTINUED] spironolactone (ALDACTONE) 25 MG Tab Pt reports not taking     • [DISCONTINUED] gabapentin (NEURONTIN) 400 MG Cap Pt reports not taking     • [DISCONTINUED] ondansetron (ZOFRAN) 4 MG TABS Pt reports not taking     • [DISCONTINUED] cyclobenzaprine (FLEXERIL) 10 MG TABS Pt reports not taking

## 2021-10-26 NOTE — PROGRESS NOTES
Patient arrived to room tele placed family at bedside oriented to room discussed poc bed in low position. covid 19 surge charting in place assessment done.

## 2021-10-26 NOTE — ED NOTES
Pt WC to room 10. Reports story c/w triage note. Pt sent here by PCP due to increasing SOB and LE swelling. Pt states recent CHF diagnosis. Also has hx of recent MI, DM, HTN, HLD. Report hx of smoking, was smoking 2 packs/day, states now down to 1 pack/day

## 2021-10-26 NOTE — H&P
Hospital Medicine History & Physical Note    Date of Service  10/26/2021    Primary Care Physician  Manny Araya M.D.      Code Status  Full Code    Chief Complaint  Chief Complaint   Patient presents with   • CHF (Acute)     pt recent dx of CHF with new prescriptions. Presents for worsening swelling in last 3 days.        History of Presenting Illness  Mer Arriaza is a 50 y.o. female who presented 10/26/2021 with shortness of breath, nonproductive cough and lower extremity swelling.  She is found to have uncontrolled hypertension and tachycardia.  She receives Lasix, nitroglycerin and referred to the hospitalist for admission.  At bedside she denies medication noncompliance, alcohol tobacco or drugs.  Urine drug screen was positive for amphetamines.    I discussed the plan of care with patient.    Review of Systems  Review of Systems   Constitutional: Negative for fever, malaise/fatigue and weight loss.   HENT: Negative for sore throat and tinnitus.    Eyes: Negative for blurred vision and double vision.   Respiratory: Positive for cough and shortness of breath. Negative for hemoptysis and stridor.    Cardiovascular: Positive for leg swelling. Negative for chest pain and palpitations.   Gastrointestinal: Negative for nausea and vomiting.   Genitourinary: Negative for dysuria and urgency.   Musculoskeletal: Negative for myalgias and neck pain.   Skin: Negative for itching and rash.   Neurological: Negative for dizziness and headaches.   Endo/Heme/Allergies: Does not bruise/bleed easily.   Psychiatric/Behavioral: Negative for depression. The patient does not have insomnia.        Past Medical History   has a past medical history of CAD (coronary artery disease), Congestive heart failure (HCC), Hypertension, and Psychiatric disorder.    Surgical History   has a past surgical history that includes other orthopedic surgery and abdominal hysterectomy total.     Family History  family history is not on  file.   Family history reviewed with patient. There is no family history that is pertinent to the chief complaint.     Social History   reports that she has been smoking cigarettes. She started smoking about 38 years ago. She has been smoking about 1.00 pack per day. She has never used smokeless tobacco. She reports current drug use. Drug: Inhaled. She reports that she does not drink alcohol.    Allergies  Allergies   Allergen Reactions   • Naltrexone Swelling     All over body   • Nkda [No Known Drug Allergy]        Medications  Prior to Admission Medications   Prescriptions Last Dose Informant Patient Reported? Taking?   INCRUSE ELLIPTA 62.5 MCG/INH AEROSOL POWDER, BREATH ACTIVATED 10/26/2021 at am  Yes No   Sig: Inhale 1 Ampule every day.   albuterol 108 (90 Base) MCG/ACT Aero Soln inhalation aerosol prn at prn  Yes No   Sig: Inhale 2 Puffs every 6 hours as needed for Shortness of Breath.   aspirin (ASPIRIN 81) 81 MG EC tablet 10/26/2021 at am  Yes No   Sig: Take 1 Tablet by mouth every day.   carvedilol (COREG) 6.25 MG Tab 10/26/2021 at am  Yes No   Sig: Take 1 Tablet by mouth 2 times a day.   furosemide (LASIX) 20 MG Tab 10/26/2021 at am  Yes No   Sig: Take 20 mg by mouth 2 times a day.   levothyroxine (SYNTHROID) 50 MCG Tab 10/26/2021 at am  Yes No   Sig: Take 50 mcg by mouth every day.   lisinopril (PRINIVIL) 40 MG tablet 10/26/2021 at am  Yes No   Sig: Take 1 Tablet by mouth every day.   metFORMIN (GLUCOPHAGE) 500 MG Tab 10/26/2021 at am  Yes No   Sig: Take 1 Tablet by mouth 2 times a day.   simvastatin (ZOCOR) 40 MG Tab 10/26/2021 at am  Yes No   Sig: Take 1 Tablet by mouth every day.      Facility-Administered Medications: None       Physical Exam  Temp:  [36.1 °C (97 °F)-36.3 °C (97.3 °F)] 36.2 °C (97.1 °F)  Pulse:  [108-114] 114  Resp:  [18-24] 24  BP: (154-172)/(108-116) 172/109  SpO2:  [84 %-98 %] 95 %  Blood Pressure: (!) 172/109   Temperature: 36.2 °C (97.1 °F)   Pulse: (!) 114   Respiration: (!) 24    Pulse Oximetry: 95 %       Physical Exam  Vitals and nursing note reviewed.   Constitutional:       General: She is not in acute distress.     Appearance: Normal appearance. She is normal weight. She is not toxic-appearing.   HENT:      Head: Normocephalic and atraumatic.      Nose: Nose normal. No congestion or rhinorrhea.      Mouth/Throat:      Mouth: Mucous membranes are moist.      Pharynx: Oropharynx is clear.   Eyes:      Extraocular Movements: Extraocular movements intact.      Conjunctiva/sclera: Conjunctivae normal.      Pupils: Pupils are equal, round, and reactive to light.   Neck:      Vascular: No carotid bruit.   Cardiovascular:      Rate and Rhythm: Normal rate and regular rhythm.      Pulses: Normal pulses.      Heart sounds: Normal heart sounds. No murmur heard.   No gallop.    Pulmonary:      Effort: No respiratory distress.      Breath sounds: Wheezing and rales present.   Abdominal:      General: Abdomen is flat. Bowel sounds are normal. There is no distension.      Palpations: Abdomen is soft. There is no mass.      Tenderness: There is no abdominal tenderness.      Hernia: No hernia is present.   Musculoskeletal:         General: No tenderness or signs of injury.      Cervical back: Normal range of motion and neck supple. No muscular tenderness.      Right lower leg: Edema present.      Left lower leg: Edema present.   Lymphadenopathy:      Cervical: No cervical adenopathy.   Skin:     Capillary Refill: Capillary refill takes less than 2 seconds.      Coloration: Skin is not jaundiced or pale.      Findings: No bruising.   Neurological:      General: No focal deficit present.      Mental Status: She is alert and oriented to person, place, and time. Mental status is at baseline.      Cranial Nerves: No cranial nerve deficit.      Motor: No weakness.      Coordination: Coordination normal.   Psychiatric:         Mood and Affect: Mood normal.         Thought Content: Thought content normal.          Judgment: Judgment normal.         Laboratory:      Recent Labs     10/25/21  2212   SODIUM 138   POTASSIUM 3.8   CHLORIDE 101   CO2 25   GLUCOSE 295*   BUN 13   CREATININE 0.83   CALCIUM 8.3*     Recent Labs     10/25/21  2212   ALTSGPT 94*   ASTSGOT 57*   ALKPHOSPHAT 145*   TBILIRUBIN 0.2   GLUCOSE 295*         No results for input(s): NTPROBNP in the last 72 hours.      Recent Labs     10/25/21  2212   TROPONINT 32*       Imaging:  DX-CHEST-PORTABLE (1 VIEW)   Final Result         1.  Cardiomegaly with central pulmonary vascular prominence, appearance suggests pulmonary vascular congestion and mild edema.   2.  Bibasilar atelectasis          X-Ray:  I have personally reviewed the images and compared with prior images.    Assessment/Plan:  I anticipate this patient is appropriate for observation status at this time.    Type 2 diabetes mellitus without complications (HCC)- (present on admission)  Assessment & Plan  Regular insulin sliding scale before every meal as needed    Methamphetamine dependence (HCC)- (present on admission)  Assessment & Plan  Follow-up urine drug screen    Left heart failure (HCC)- (present on admission)  Assessment & Plan  Secondary to volume overload follow-up urine drug screen,, education, CHF care set deployed    Chronic obstructive pulmonary disease (HCC)- (present on admission)  Assessment & Plan  Compensated, supplemental oxygen, aggressive pulmonary toilet    Abnormal liver function tests- (present on admission)  Assessment & Plan  Likely secondary to fatty liver, consider right upper quadrant ultrasound        VTE prophylaxis: SCDs/TEDs

## 2021-10-26 NOTE — ED TRIAGE NOTES
Chief Complaint   Patient presents with   • CHF (Acute)     pt recent dx of CHF with new prescriptions. Presents for worsening swelling in last 3 days.      Pt sent by PCP for above concern. Hx of heart attack in September, Dx of CHF with prescription of aspirin, carvedilol, and lasix 3 days ago. Pt reports increased generalized swelling, particularly in legs and abdomen since then. Pt also complaining of SOB, prescribed 3L NC for home but has not received home oxygen yet. VSS in triage, in NAD, wet cough, slightly SOB on 3L NC

## 2021-10-26 NOTE — CARE PLAN
Problem: Pain - Standard  Goal: Alleviation of pain or a reduction in pain to the patient’s comfort goal  Outcome: Progressing     Problem: Knowledge Deficit - Standard  Goal: Patient and family/care givers will demonstrate understanding of plan of care, disease process/condition, diagnostic tests and medications  Outcome: Progressing   The patient is Stable - Low risk of patient condition declining or worsening         Progress made toward(s) clinical / shift goals:  discussed poc and fluid restriction    Patient is not progressing towards the following goals:

## 2021-10-26 NOTE — ED PROVIDER NOTES
ED Provider Note    CHIEF COMPLAINT  Chief Complaint   Patient presents with   • CHF (Acute)     pt recent dx of CHF with new prescriptions. Presents for worsening swelling in last 3 days.        HPIDictation #1  MRN:[id number]  [facility]:[id number]    Mer Arriaza is a 50 y.o. female who presents to the emergency department with worsening shortness of breath and leg swelling. Past medical history as documented below. Has been followed by her primary care physician. Recent echocardiogram showing ejection fraction of roughly 30%. She states that she is recently decreased her tobacco use from two packs a day to one pack a day. Has now been on supplemental oxygen for the last three days at home due to oxygen saturation's being in the 60s at her recent outpatient office visit. She has however continue to be significantly short of breath with again worsening edema and therefore was sent in the direction of her doctor for further CHF management.    REVIEW OF SYSTEMS  See HPI for further details. All other systems are negative.     PAST MEDICAL HISTORY   has a past medical history of CAD (coronary artery disease), Congestive heart failure (HCC), Hypertension, and Psychiatric disorder.    SOCIAL HISTORY  Social History     Tobacco Use   • Smoking status: Current Every Day Smoker     Packs/day: 1.00     Types: Cigarettes     Start date: 1983   • Smokeless tobacco: Never Used   Substance and Sexual Activity   • Alcohol use: No   • Drug use: Yes     Types: Inhaled     Comment: occassional meth use, last 2 days ago   • Sexual activity: Not on file       SURGICAL HISTORY   has a past surgical history that includes other orthopedic surgery and abdominal hysterectomy total.    CURRENT MEDICATIONS  Home Medications     Reviewed by Susannah Dawn (Pharmacy Tech) on 10/26/21 at 0142  Med List Status: Complete   Medication Last Dose Status   albuterol 108 (90 Base) MCG/ACT Aero Soln inhalation aerosol prn Active   aspirin  "(ASPIRIN 81) 81 MG EC tablet 10/25/2021 Active   carvedilol (COREG) 6.25 MG Tab 10/25/2021 Active   furosemide (LASIX) 20 MG Tab 10/25/2021 Active   INCRUSE ELLIPTA 62.5 MCG/INH AEROSOL POWDER, BREATH ACTIVATED 10/25/2021 Active   levothyroxine (SYNTHROID) 50 MCG Tab 10/25/2021 Active   lisinopril (PRINIVIL) 40 MG tablet 10/25/2021 Active   metFORMIN (GLUCOPHAGE) 500 MG Tab 10/25/2021 Active   simvastatin (ZOCOR) 40 MG Tab 10/25/2021 Active                ALLERGIES  Allergies   Allergen Reactions   • Naltrexone Swelling     All over body   • Nkda [No Known Drug Allergy]        PHYSICAL EXAM  VITAL SIGNS: /110   Pulse (!) 115   Temp 36.2 °C (97.1 °F) (Temporal)   Resp (!) 22   Ht 1.549 m (5' 1\")   Wt 83.9 kg (185 lb)   SpO2 95%   BMI 34.96 kg/m²  @DANG[[id number]::@   Pulse ox interpretation: I interpret this pulse ox as normal.  Constitutional: Alert in no apparent distress.  HENT: No signs of trauma, Bilateral external ears normal, Nose normal.   Eyes: Pupils are equal and reactive  Neck: Normal range of motion, No tenderness, Supple   Cardiovascular: tachycardic, hypotensive on monitor, regular rate and rhythm, no murmurs.   Thorax & Lungs: tachypnea, horse voice, by Basler crackles  Abdomen: Bowel sounds normal, Soft, No tenderness  Skin: Warm, Dry, No erythema, No rash.   Extremities: Intact distal pulses, one plus edema bilateral lower extremities  Musculoskeletal: Good range of motion in all major joints. No tenderness to palpation or major deformities noted.   Neurologic: Alert , Normal motor function, Normal sensory function, No focal deficits noted.   Psychiatric: Affect normal, Judgment normal, Mood normal.       DIAGNOSTIC STUDIES / PROCEDURES      LABS  Results for orders placed or performed during the hospital encounter of 10/26/21   CBC with Differential   Result Value Ref Range    WBC 8.4 4.8 - 10.8 K/uL    RBC 4.82 4.20 - 5.40 M/uL    Hemoglobin 13.6 12.0 - 16.0 g/dL    Hematocrit 42.3 37.0 - " 47.0 %    MCV 87.8 81.4 - 97.8 fL    MCH 28.2 27.0 - 33.0 pg    MCHC 32.2 (L) 33.6 - 35.0 g/dL    RDW 43.0 35.9 - 50.0 fL    Platelet Count 341 164 - 446 K/uL    MPV 9.3 9.0 - 12.9 fL    Neutrophils-Polys 60.10 44.00 - 72.00 %    Lymphocytes 28.10 22.00 - 41.00 %    Monocytes 9.90 0.00 - 13.40 %    Eosinophils 1.00 0.00 - 6.90 %    Basophils 0.50 0.00 - 1.80 %    Immature Granulocytes 0.40 0.00 - 0.90 %    Nucleated RBC 0.00 /100 WBC    Neutrophils (Absolute) 5.07 2.00 - 7.15 K/uL    Lymphs (Absolute) 2.36 1.00 - 4.80 K/uL    Monos (Absolute) 0.83 0.00 - 0.85 K/uL    Eos (Absolute) 0.08 0.00 - 0.51 K/uL    Baso (Absolute) 0.04 0.00 - 0.12 K/uL    Immature Granulocytes (abs) 0.03 0.00 - 0.11 K/uL    NRBC (Absolute) 0.00 K/uL   Complete Metabolic Panel (CMP)   Result Value Ref Range    Sodium 138 135 - 145 mmol/L    Potassium 3.8 3.6 - 5.5 mmol/L    Chloride 101 96 - 112 mmol/L    Co2 25 20 - 33 mmol/L    Anion Gap 12.0 7.0 - 16.0    Glucose 295 (H) 65 - 99 mg/dL    Bun 13 8 - 22 mg/dL    Creatinine 0.83 0.50 - 1.40 mg/dL    Calcium 8.3 (L) 8.5 - 10.5 mg/dL    AST(SGOT) 57 (H) 12 - 45 U/L    ALT(SGPT) 94 (H) 2 - 50 U/L    Alkaline Phosphatase 145 (H) 30 - 99 U/L    Total Bilirubin 0.2 0.1 - 1.5 mg/dL    Albumin 3.6 3.2 - 4.9 g/dL    Total Protein 7.2 6.0 - 8.2 g/dL    Globulin 3.6 (H) 1.9 - 3.5 g/dL    A-G Ratio 1.0 g/dL   Troponin   Result Value Ref Range    Troponin T 32 (H) 6 - 19 ng/L   ESTIMATED GFR   Result Value Ref Range    GFR If African American >60 >60 mL/min/1.73 m 2    GFR If Non African American >60 >60 mL/min/1.73 m 2   TROPONIN   Result Value Ref Range    Troponin T 37 (H) 6 - 19 ng/L   proBrain Natriuretic Peptide, NT   Result Value Ref Range    NT-proBNP 1769 (H) 0 - 125 pg/mL   Magnesium   Result Value Ref Range    Magnesium 1.5 1.5 - 2.5 mg/dL   Comp Metabolic Panel   Result Value Ref Range    Sodium 138 135 - 145 mmol/L    Potassium 3.6 3.6 - 5.5 mmol/L    Chloride 102 96 - 112 mmol/L    Co2 27 20  - 33 mmol/L    Anion Gap 9.0 7.0 - 16.0    Glucose 279 (H) 65 - 99 mg/dL    Bun 13 8 - 22 mg/dL    Creatinine 0.86 0.50 - 1.40 mg/dL    Calcium 8.5 8.5 - 10.5 mg/dL    AST(SGOT) 53 (H) 12 - 45 U/L    ALT(SGPT) 83 (H) 2 - 50 U/L    Alkaline Phosphatase 142 (H) 30 - 99 U/L    Total Bilirubin 0.2 0.1 - 1.5 mg/dL    Albumin 3.4 3.2 - 4.9 g/dL    Total Protein 6.4 6.0 - 8.2 g/dL    Globulin 3.0 1.9 - 3.5 g/dL    A-G Ratio 1.1 g/dL   proBrain Natriuretic Peptide, NT   Result Value Ref Range    NT-proBNP 1654 (H) 0 - 125 pg/mL   URINE DRUG SCREEN   Result Value Ref Range    Amphetamines Urine Positive (A) Negative    Barbiturates Negative Negative    Benzodiazepines Negative Negative    Cocaine Metabolite Negative Negative    Methadone Negative Negative    Opiates Negative Negative    Oxycodone Negative Negative    Phencyclidine -Pcp Negative Negative    Propoxyphene Negative Negative    Cannabinoid Metab Negative Negative   ESTIMATED GFR   Result Value Ref Range    GFR If African American >60 >60 mL/min/1.73 m 2    GFR If Non African American >60 >60 mL/min/1.73 m 2   EKG   Result Value Ref Range    Report       [facility]    Test Date:  2021-10-25  Pt Name:    ZEESHAN VASQUEZ            Department: ER  MRN:        [id number]                      Room:  Gender:     Female                       Technician: [id number]  :        [id number]                   Requested By:ER TRIAGE PROTOCOL  Order #:    [id number]                    Reading MD:    Measurements  Intervals                                Axis  Rate:       109                          P:          72  LA:         168                          QRS:        -3  QRSD:       82                           T:  QT:         360  QTc:        485    Interpretive Statements  SINUS TACHYCARDIA  FARHANA, CONSIDER BIATRIAL ABNORMALITIES  ABNRM R PROG, CONSIDER ASMI OR LEAD PLACEMENT  BORDERLINE T ABNORMALITIES, INFERIOR LEADS  Compared to ECG 2015  14:55:45  Myocardial infarct finding now present  T-wave abnormality now present           RADIOLOGY  DX-CHEST-PORTABLE (1 VIEW)   Final Result         1.  Cardiomegaly with central pulmonary vascular prominence, appearance suggests pulmonary vascular congestion and mild edema.   2.  Bibasilar atelectasis              COURSE & MEDICAL DECISION MAKING  Pertinent Labs & Imaging studies reviewed. (See chart for details)  50-year-old female presented emergency room with acute on chronic CHF. History as above. Exam and x-ray imaging as well as BNP all correlative to acute CHF exacerbation. She has been started on diuretics and also nitroglycerin to assist with blood pressure and volume management. No significant change while here in the ER. Troponin is additionally mildly elevated. This may be secondary to selling hypoxic demand. I have a lower suspicion for acute ACS type I troponin elevation. Will the patient brought in under the hospital service for ongoing inpatient care at this time.          FINAL IMPRESSION  1. Acute on chronic congestive heart failure, unspecified heart failure type (HCC)            Electronically signed by: Jose Alberto Farah M.D., 10/26/2021 1:34 AM

## 2021-10-27 ENCOUNTER — TELEPHONE (OUTPATIENT)
Dept: CARDIOLOGY | Facility: MEDICAL CENTER | Age: 51
End: 2021-10-27

## 2021-10-27 ENCOUNTER — APPOINTMENT (OUTPATIENT)
Dept: CARDIOLOGY | Facility: MEDICAL CENTER | Age: 51
End: 2021-10-27
Attending: INTERNAL MEDICINE
Payer: MEDICAID

## 2021-10-27 VITALS
WEIGHT: 186.07 LBS | BODY MASS INDEX: 35.13 KG/M2 | RESPIRATION RATE: 18 BRPM | TEMPERATURE: 98.7 F | SYSTOLIC BLOOD PRESSURE: 142 MMHG | DIASTOLIC BLOOD PRESSURE: 96 MMHG | HEART RATE: 78 BPM | OXYGEN SATURATION: 92 % | HEIGHT: 61 IN

## 2021-10-27 LAB
ANION GAP SERPL CALC-SCNC: 6 MMOL/L (ref 7–16)
BASOPHILS # BLD AUTO: 0.5 % (ref 0–1.8)
BASOPHILS # BLD: 0.04 K/UL (ref 0–0.12)
BUN SERPL-MCNC: 17 MG/DL (ref 8–22)
CALCIUM SERPL-MCNC: 8.4 MG/DL (ref 8.5–10.5)
CHLORIDE SERPL-SCNC: 96 MMOL/L (ref 96–112)
CO2 SERPL-SCNC: 35 MMOL/L (ref 20–33)
CREAT SERPL-MCNC: 1.14 MG/DL (ref 0.5–1.4)
EOSINOPHIL # BLD AUTO: 0.13 K/UL (ref 0–0.51)
EOSINOPHIL NFR BLD: 1.7 % (ref 0–6.9)
ERYTHROCYTE [DISTWIDTH] IN BLOOD BY AUTOMATED COUNT: 43.4 FL (ref 35.9–50)
EST. AVERAGE GLUCOSE BLD GHB EST-MCNC: 263 MG/DL
GLUCOSE BLD-MCNC: 222 MG/DL (ref 65–99)
GLUCOSE BLD-MCNC: 254 MG/DL (ref 65–99)
GLUCOSE BLD-MCNC: 295 MG/DL (ref 65–99)
GLUCOSE SERPL-MCNC: 259 MG/DL (ref 65–99)
HBA1C MFR BLD: 10.8 % (ref 4–5.6)
HCT VFR BLD AUTO: 42.1 % (ref 37–47)
HGB BLD-MCNC: 13.4 G/DL (ref 12–16)
IMM GRANULOCYTES # BLD AUTO: 0.03 K/UL (ref 0–0.11)
IMM GRANULOCYTES NFR BLD AUTO: 0.4 % (ref 0–0.9)
LV EJECT FRACT  99904: 30
LV EJECT FRACT MOD 2C 99903: 24.4
LV EJECT FRACT MOD 4C 99902: 31.54
LV EJECT FRACT MOD BP 99901: 30.3
LYMPHOCYTES # BLD AUTO: 2.39 K/UL (ref 1–4.8)
LYMPHOCYTES NFR BLD: 31.9 % (ref 22–41)
MCH RBC QN AUTO: 27.9 PG (ref 27–33)
MCHC RBC AUTO-ENTMCNC: 31.8 G/DL (ref 33.6–35)
MCV RBC AUTO: 87.7 FL (ref 81.4–97.8)
MONOCYTES # BLD AUTO: 0.71 K/UL (ref 0–0.85)
MONOCYTES NFR BLD AUTO: 9.5 % (ref 0–13.4)
NEUTROPHILS # BLD AUTO: 4.2 K/UL (ref 2–7.15)
NEUTROPHILS NFR BLD: 56 % (ref 44–72)
NRBC # BLD AUTO: 0 K/UL
NRBC BLD-RTO: 0 /100 WBC
PLATELET # BLD AUTO: 316 K/UL (ref 164–446)
PMV BLD AUTO: 9.2 FL (ref 9–12.9)
POTASSIUM SERPL-SCNC: 3.9 MMOL/L (ref 3.6–5.5)
RBC # BLD AUTO: 4.8 M/UL (ref 4.2–5.4)
SODIUM SERPL-SCNC: 137 MMOL/L (ref 135–145)
WBC # BLD AUTO: 7.5 K/UL (ref 4.8–10.8)

## 2021-10-27 PROCEDURE — 700102 HCHG RX REV CODE 250 W/ 637 OVERRIDE(OP): Performed by: INTERNAL MEDICINE

## 2021-10-27 PROCEDURE — 93306 TTE W/DOPPLER COMPLETE: CPT | Mod: 26 | Performed by: INTERNAL MEDICINE

## 2021-10-27 PROCEDURE — 93306 TTE W/DOPPLER COMPLETE: CPT

## 2021-10-27 PROCEDURE — 82962 GLUCOSE BLOOD TEST: CPT

## 2021-10-27 PROCEDURE — 80048 BASIC METABOLIC PNL TOTAL CA: CPT

## 2021-10-27 PROCEDURE — A9270 NON-COVERED ITEM OR SERVICE: HCPCS | Performed by: INTERNAL MEDICINE

## 2021-10-27 PROCEDURE — 85025 COMPLETE CBC W/AUTO DIFF WBC: CPT

## 2021-10-27 PROCEDURE — 96376 TX/PRO/DX INJ SAME DRUG ADON: CPT | Mod: XU

## 2021-10-27 PROCEDURE — 700102 HCHG RX REV CODE 250 W/ 637 OVERRIDE(OP)

## 2021-10-27 PROCEDURE — G0378 HOSPITAL OBSERVATION PER HR: HCPCS

## 2021-10-27 PROCEDURE — 96372 THER/PROPH/DIAG INJ SC/IM: CPT

## 2021-10-27 PROCEDURE — 99217 PR OBSERVATION CARE DISCHARGE: CPT | Performed by: INTERNAL MEDICINE

## 2021-10-27 PROCEDURE — 700111 HCHG RX REV CODE 636 W/ 250 OVERRIDE (IP): Performed by: INTERNAL MEDICINE

## 2021-10-27 PROCEDURE — 83036 HEMOGLOBIN GLYCOSYLATED A1C: CPT

## 2021-10-27 PROCEDURE — 700111 HCHG RX REV CODE 636 W/ 250 OVERRIDE (IP)

## 2021-10-27 RX ORDER — SPIRONOLACTONE 25 MG/1
25 TABLET ORAL DAILY
Qty: 30 TABLET | Refills: 3 | Status: SHIPPED | OUTPATIENT
Start: 2021-10-27 | End: 2021-11-12 | Stop reason: SDUPTHER

## 2021-10-27 RX ORDER — POTASSIUM CHLORIDE 20 MEQ/1
20 TABLET, EXTENDED RELEASE ORAL DAILY
Qty: 30 TABLET | Refills: 0 | Status: SHIPPED | OUTPATIENT
Start: 2021-10-27 | End: 2021-10-27 | Stop reason: SDUPTHER

## 2021-10-27 RX ORDER — OXYCODONE HYDROCHLORIDE 5 MG/1
5 TABLET ORAL EVERY 6 HOURS PRN
Status: DISCONTINUED | OUTPATIENT
Start: 2021-10-27 | End: 2021-10-27 | Stop reason: HOSPADM

## 2021-10-27 RX ORDER — POTASSIUM CHLORIDE 750 MG/1
10 TABLET, EXTENDED RELEASE ORAL DAILY
Qty: 30 TABLET | Refills: 0 | Status: SHIPPED | OUTPATIENT
Start: 2021-10-27 | End: 2021-11-12 | Stop reason: SDUPTHER

## 2021-10-27 RX ORDER — FUROSEMIDE 20 MG/1
20 TABLET ORAL 2 TIMES DAILY
Qty: 60 TABLET | Refills: 0 | Status: SHIPPED | OUTPATIENT
Start: 2021-10-27 | End: 2021-11-12 | Stop reason: SDUPTHER

## 2021-10-27 RX ADMIN — INSULIN HUMAN 3 UNITS: 100 INJECTION, SOLUTION PARENTERAL at 11:56

## 2021-10-27 RX ADMIN — FUROSEMIDE 40 MG: 10 INJECTION, SOLUTION INTRAMUSCULAR; INTRAVENOUS at 11:56

## 2021-10-27 RX ADMIN — INSULIN HUMAN 2 UNITS: 100 INJECTION, SOLUTION PARENTERAL at 07:35

## 2021-10-27 RX ADMIN — ASPIRIN 81 MG: 81 TABLET, COATED ORAL at 05:42

## 2021-10-27 RX ADMIN — CYCLOBENZAPRINE 5 MG: 10 TABLET, FILM COATED ORAL at 05:43

## 2021-10-27 RX ADMIN — INSULIN GLARGINE 5 UNITS: 100 INJECTION, SOLUTION SUBCUTANEOUS at 00:17

## 2021-10-27 RX ADMIN — OXYCODONE 5 MG: 5 TABLET ORAL at 01:22

## 2021-10-27 RX ADMIN — POTASSIUM CHLORIDE 40 MEQ: 1500 TABLET, EXTENDED RELEASE ORAL at 05:42

## 2021-10-27 RX ADMIN — Medication 400 MG: at 05:43

## 2021-10-27 RX ADMIN — CARVEDILOL 6.25 MG: 6.25 TABLET, FILM COATED ORAL at 05:43

## 2021-10-27 RX ADMIN — SIMVASTATIN 40 MG: 20 TABLET, FILM COATED ORAL at 05:42

## 2021-10-27 RX ADMIN — LEVOTHYROXINE SODIUM 50 MCG: 0.05 TABLET ORAL at 05:43

## 2021-10-27 RX ADMIN — ENOXAPARIN SODIUM 40 MG: 40 INJECTION SUBCUTANEOUS at 05:44

## 2021-10-27 RX ADMIN — CYCLOBENZAPRINE 5 MG: 10 TABLET, FILM COATED ORAL at 11:56

## 2021-10-27 RX ADMIN — LISINOPRIL 40 MG: 20 TABLET ORAL at 05:43

## 2021-10-27 ASSESSMENT — PAIN DESCRIPTION - PAIN TYPE
TYPE: ACUTE PAIN

## 2021-10-27 NOTE — PROGRESS NOTES
Daily Progress Note:     Date of Service: 10/26/2021  Primary Team: [facility] Team   Attending: Lukas Schmid M.D.   Senior Resident: Dr. Monreal  Intern: Dr. Loo  Contact:  [phone]    Chief Complaint:  SOB     ID:  Ms. Arriaza with a PMHx of CHF, CADx1 stent, DM, HTN admitted for acute exacerbation of CHF with associated sx SOB, leg swelling.    Subjective:  Pt wincing in pain states her legs are swollen with distension causing her distress.  Pt's boyfriend available at beside.  Pt had requested to leave AMA after my encounter stating that she was hungry and wanted to go home.  I discussed the need for her to consider staying for admission in order to diurese her retained fluid.  Pt's diet order was placed at 830A and pt missed breakfast leading to confusion as to when she would eat.  All questions were answered.  Pt agreed with treatment plan to stay overnight.  Denies fever, chills, CP, palpitations, abd pain, N/V/D, dysuria, blood in stool.    Interval History:  K+ 3.6. K-dur 20 mEq BID. Mg 1.5 repleted with 4g IV Mg. 40mg IV  Furosemide TID.  Transferred to CDU.      Consultants/Specialty:      Review of Systems:    Review of Systems   Constitutional: Negative for chills and fever.   HENT: Negative for hearing loss and tinnitus.    Eyes: Negative for blurred vision and double vision.   Respiratory: Positive for shortness of breath. Negative for cough and hemoptysis.    Cardiovascular: Positive for leg swelling (BLE). Negative for chest pain and palpitations.   Gastrointestinal: Negative for abdominal pain, diarrhea, heartburn, nausea and vomiting.   Genitourinary: Negative for dysuria, frequency, hematuria and urgency.   Musculoskeletal: Negative for myalgias and neck pain.   Skin: Negative for itching and rash.   Neurological: Negative for dizziness and headaches.   Endo/Heme/Allergies: Negative for polydipsia.   Psychiatric/Behavioral: Negative for depression and suicidal ideas.       Objective Data:    Physical Exam:   Vitals:   Temp:  [36.1 °C (97 °F)-37.1 °C (98.7 °F)] 37.1 °C (98.7 °F)  Pulse:  [101-123] 106  Resp:  [18-24] 20  BP: (128-176)/() 128/81  SpO2:  [84 %-99 %] 95 %    Physical Exam  Vitals and nursing note reviewed.   Constitutional:       General: She is in acute distress.      Appearance: Normal appearance. She is obese. She is not diaphoretic.   HENT:      Head: Normocephalic and atraumatic.      Right Ear: Tympanic membrane and external ear normal.      Left Ear: Tympanic membrane and external ear normal.      Nose: Nose normal. No congestion or rhinorrhea.      Mouth/Throat:      Mouth: Mucous membranes are moist.      Pharynx: Oropharynx is clear. No oropharyngeal exudate or posterior oropharyngeal erythema.   Eyes:      General: No scleral icterus.        Right eye: No discharge.         Left eye: No discharge.      Extraocular Movements: Extraocular movements intact.      Conjunctiva/sclera: Conjunctivae normal.      Pupils: Pupils are equal, round, and reactive to light.   Cardiovascular:      Rate and Rhythm: Regular rhythm. Tachycardia present.      Pulses: Normal pulses.      Heart sounds: No murmur heard.   No friction rub. No gallop.       Comments: No S3. No S4 noted.    Trace pitting edema, BLE.      Pulmonary:      Effort: Pulmonary effort is normal. No respiratory distress.      Breath sounds: Normal breath sounds. No stridor.   Abdominal:      General: Abdomen is flat. Bowel sounds are normal. There is no distension.      Palpations: Abdomen is soft. There is no mass.      Tenderness: There is no abdominal tenderness. There is no guarding.   Musculoskeletal:         General: Normal range of motion.   Skin:     General: Skin is warm and dry.   Neurological:      General: No focal deficit present.      Mental Status: She is alert and oriented to person, place, and time.           Labs:   Recent Results (from the past 24 hour(s))   EKG    Collection Time: 10/25/21  6:49 PM    Result Value Ref Range    Report       [facility] Emergency Dept.    Test Date:  2021-10-25  Pt Name:    ZEESHAN VASQUEZ            Department: ER  MRN:        [id number]                      Room:  Gender:     Female                       [facility]  :        1970                   Requested By:ER TRIAGE PROTOCOL  Order #:    [id number]                    Reading MD:    Measurements  Intervals                                Axis  Rate:       110                          P:          68  CA:         144                          QRS:        -22  QRSD:       86                           T:          41  QT:         352  QTc:        477    Interpretive Statements  SINUS TACHYCARDIA  PROBABLE LEFT ATRIAL ABNORMALITY  BORDERLINE LEFT AXIS DEVIATION  BORDERLINE T WAVE ABNORMALITIES  BASELINE WANDER IN LEAD(S) V1,V3,V4  Compared to ECG 2015 14:55:45  T-wave abnormality now present     Complete Metabolic Panel (CMP)    Collection Time: 10/25/21 10:12 PM   Result Value Ref Range    Sodium 138 135 - 145 mmol/L    Potassium 3.8 3.6 - 5.5 mmol/L    Chloride 101 96 - 112 mmol/L    Co2 25 20 - 33 mmol/L    Anion Gap 12.0 7.0 - 16.0    Glucose 295 (H) 65 - 99 mg/dL    Bun 13 8 - 22 mg/dL    Creatinine 0.83 0.50 - 1.40 mg/dL    Calcium 8.3 (L) 8.5 - 10.5 mg/dL    AST(SGOT) 57 (H) 12 - 45 U/L    ALT(SGPT) 94 (H) 2 - 50 U/L    Alkaline Phosphatase 145 (H) 30 - 99 U/L    Total Bilirubin 0.2 0.1 - 1.5 mg/dL    Albumin 3.6 3.2 - 4.9 g/dL    Total Protein 7.2 6.0 - 8.2 g/dL    Globulin 3.6 (H) 1.9 - 3.5 g/dL    A-G Ratio 1.0 g/dL   Troponin    Collection Time: 10/25/21 10:12 PM   Result Value Ref Range    Troponin T 32 (H) 6 - 19 ng/L   ESTIMATED GFR    Collection Time: 10/25/21 10:12 PM   Result Value Ref Range    GFR If African American >60 >60 mL/min/1.73 m 2    GFR If Non African American >60 >60 mL/min/1.73 m 2   EKG    Collection Time: 10/25/21 11:30 PM   Result Value Ref Range    Report        [facility] Emergency Dept.    Test Date:  2021-10-25  Pt Name:    ZEESHAN VASQUEZ            Department: ER  MRN:        [id number]                      Room:  Gender:     Female                       Technician: [id number]  :        1970                   Requested By:ER TRIAGE PROTOCOL  Order #:    [id number]                    Reading MD:    Measurements  Intervals                                Axis  Rate:       109                          P:          72  DE:         168                          QRS:        -3  QRSD:       82                           T:  QT:         360  QTc:        485    Interpretive Statements  SINUS TACHYCARDIA  FARHANA, CONSIDER BIATRIAL ABNORMALITIES  ABNRM R PROG, CONSIDER ASMI OR LEAD PLACEMENT  BORDERLINE T ABNORMALITIES, INFERIOR LEADS  Compared to ECG 2015 14:55:45  Myocardial infarct finding now present  T-wave abnormality now present     TROPONIN    Collection Time: 10/26/21  1:12 AM   Result Value Ref Range    Troponin T 37 (H) 6 - 19 ng/L   proBrain Natriuretic Peptide, NT    Collection Time: 10/26/21  1:12 AM   Result Value Ref Range    NT-proBNP 1769 (H) 0 - 125 pg/mL   Magnesium    Collection Time: 10/26/21  1:56 AM   Result Value Ref Range    Magnesium 1.5 1.5 - 2.5 mg/dL   Comp Metabolic Panel    Collection Time: 10/26/21  1:56 AM   Result Value Ref Range    Sodium 138 135 - 145 mmol/L    Potassium 3.6 3.6 - 5.5 mmol/L    Chloride 102 96 - 112 mmol/L    Co2 27 20 - 33 mmol/L    Anion Gap 9.0 7.0 - 16.0    Glucose 279 (H) 65 - 99 mg/dL    Bun 13 8 - 22 mg/dL    Creatinine 0.86 0.50 - 1.40 mg/dL    Calcium 8.5 8.5 - 10.5 mg/dL    AST(SGOT) 53 (H) 12 - 45 U/L    ALT(SGPT) 83 (H) 2 - 50 U/L    Alkaline Phosphatase 142 (H) 30 - 99 U/L    Total Bilirubin 0.2 0.1 - 1.5 mg/dL    Albumin 3.4 3.2 - 4.9 g/dL    Total Protein 6.4 6.0 - 8.2 g/dL    Globulin 3.0 1.9 - 3.5 g/dL    A-G Ratio 1.1 g/dL   proBrain Natriuretic Peptide, NT    Collection Time:  10/26/21  1:56 AM   Result Value Ref Range    NT-proBNP 1654 (H) 0 - 125 pg/mL   URINE DRUG SCREEN    Collection Time: 10/26/21  1:56 AM   Result Value Ref Range    Amphetamines Urine Positive (A) Negative    Barbiturates Negative Negative    Benzodiazepines Negative Negative    Cocaine Metabolite Negative Negative    Methadone Negative Negative    Opiates Negative Negative    Oxycodone Negative Negative    Phencyclidine -Pcp Negative Negative    Propoxyphene Negative Negative    Cannabinoid Metab Negative Negative   ESTIMATED GFR    Collection Time: 10/26/21  1:56 AM   Result Value Ref Range    GFR If African American >60 >60 mL/min/1.73 m 2    GFR If Non African American >60 >60 mL/min/1.73 m 2   COV-2, FLU A/B, AND RSV BY PCR (2-4 HOURS CEPHEID): Collect NP swab in VTM    Collection Time: 10/26/21  2:54 AM    Specimen: Nasopharyngeal; Respirate   Result Value Ref Range    Influenza virus A RNA Negative Negative    Influenza virus B, PCR Negative Negative    RSV, PCR Negative Negative    SARS-CoV-2 by PCR NotDetected     SARS-CoV-2 Source NP Swab    POCT glucose device results    Collection Time: 10/26/21  9:55 AM   Result Value Ref Range    Glucose - Accu-Ck 296 (H) 65 - 99 mg/dL   POCT glucose device results    Collection Time: 10/26/21  5:18 PM   Result Value Ref Range    Glucose - Accu-Ck 275 (H) 65 - 99 mg/dL       Imaging:   Independant Imaging Review: Completed  DX-CHEST-PORTABLE (1 VIEW)   Final Result         1.  Cardiomegaly with central pulmonary vascular prominence, appearance suggests pulmonary vascular congestion and mild edema.   2.  Bibasilar atelectasis          Problem Representation:    EENT: patent airway. Mallalampati score grade I.    MSK/Pain: pain mostly in BLE likely due distension 2/2 to fluid overload.  Treating with IV furosemide TID to help diurese third-spaced fluid to mitigate pain.    CVS: RRR. Volume status likely fluid overload.  1200 mL fluid restriction. 2 g sodium restriction.   IV furosemide 40 mg TID. 2+ radial pulses bilaterally.  Trace pitting edema noted BLE. BNP 1654 and 1769. Trop 37 and 32.  EKG showed sinus tachycardia, FARHANA.  H/o MI stent placement.  UDS positive for amphetamines. Tachycardia with third spacing of fluid likely reducing cardiac output.    Pulm: Coarse breath sounds heard throughout.  Fluid restriction restriction and   Sodium restriction will likely help with fluid accumulation in lungs. 3L O2. Pt appears uncomfortable but supplemental O2 demand has not increased from home O2.     GI: Elevated AST (53), ALT (83), alkaline phosphatase (142) likely due elevated  central venous pressure or hypoperfusion 2/2  CO.    Renal: no flank tenderness. BUN/Cr 13/0.86.      Heme: Enoxaparin for DVT prophylaxis.     Endo: DM2. On sliding scale. 5 u lantus once at bedtime.  POC consistently above 200 today.    Derm/Breast: none    ID: none        Tubes: none  Lines: pIV LUE  Drains: none  Fluids: none  Diet: diabetic diet with fluid restriction of 1200 and sodium restriction 2g.  DVT prophylaxis: Enoxaparin  GI prophylaxis: none  Antibiotics: none  Code Status: Full  Disposition: transfer to CDU

## 2021-10-27 NOTE — PROGRESS NOTES
Received report from gamaliel Rn assumed care of patient. Assessment done call light within reach bed in low position discussed poc. covid 189 surge charting in place

## 2021-10-27 NOTE — PROGRESS NOTES
"This RN went into patient's room to give morning medications, patient found tearful and screaming \"why can't I have any water\". Patient educated on fluid restrictions and that she had gotten 500 ml of milk since midnight. Patient given 250 ml water for morning medications.  "

## 2021-10-27 NOTE — CARE PLAN
Problem: Pain - Standard  Goal: Alleviation of pain or a reduction in pain to the patient’s comfort goal  Outcome: Progressing     Problem: Care Map:  Day 1 Optimal Outcome for the Heart Failure Patient  Goal: Day 1:  Optimal Care of the heart failure patient  Outcome: Progressing   The patient is Stable - Low risk of patient condition declining or worsening         Progress made toward(s) clinical / shift goals:  chf receiving iv lasix    Patient is not progressing towards the following goals:

## 2021-10-27 NOTE — DISCHARGE PLANNING
Care Transition Team Assessment    Spoke with patient at bedside and verified all information. Lives with S.O. in 2 story apartment. PCP Manny Araya. Has Medicaid HMO insurance.  States is suppose to have Home O2 with [employer] but when called [employer] and no order received. CM will attempt to F/U on Home O2. S.O. will be ride @ D/C.      Information Source  Orientation Level: Oriented X4  Information Given By: Patient    Readmission Evaluation  Is this a readmission?: No    Interdisciplinary Discharge Planning  Primary Care Physician: Manny Araya  Lives with - Patient's Self Care Capacity: Significant Other  Support Systems: Spouse / Significant Other  Housing / Facility: 2 Story Apartment / Condo  Do You Take your Prescribed Medications Regularly: Yes  Able to Return to Previous ADL's: Yes  Mobility Issues: No  Prior Services: Home-Independent  Patient Prefers to be Discharged to:: Home  Assistance Needed: Unknown at this Time  Durable Medical Equipment: Not Applicable    Discharge Preparedness  What are your discharge supports?: Spouse  Prior Functional Level: Ambulatory    Functional Assesment  Prior Functional Level: Ambulatory    Finances  Prescription Coverage: Yes    Discharge Risks or Barriers  Patient risk factors: Substance abuse    Anticipated Discharge Information  Discharge Disposition: Discharged to home/self care (01)  Discharge Address: [address]  Discharge Contact Phone Number: [phone]

## 2021-10-27 NOTE — PROGRESS NOTES
Patient tearful and restless complaining of spasms to right leg. Patient rates pain 8/10, Dr. Malin notified. New orders received for prn oxycodone.

## 2021-10-27 NOTE — PROGRESS NOTES
COVID-19 surge in effect.     Patient is AOx4, denies n/v but reports some shortness of breath and 8/10 cramping pain to right leg. Patient sating at 95% on 3L of O2 via nasal cannula. Patient medicated for pain per MAR. Patient updated on plan of care. Bed locked and in lowest position, call light and personal belongings within reach. Call light and personal belongings within reach.

## 2021-10-27 NOTE — HEART FAILURE PROGRAM
Patient admitted to CDU after her PCP found her to be in acute decompensation of her HFrEF. Our echocardiogram shows EF of 30%. Per the PCP note, patient follows with [facility] and has had several admissions to their hospital without having her HF medications optimized.    With that said, patient left AMA after a coronary stent was placed in September and her utox is positive for amphetamines. She has already expressed desire to leave AMA on this admission.    Unfortunately, if patient does not stay inpatient long enough to be thoroughly de-congested and if she continues to use methamphetamines, she will not be able to be optimized on her HF regimen.    I've asked the schedulers to arrange f/u at [facility]. Somehow, patient was scheduled at our heart failure clinic earlier this month and she did not show up to the appointment.    Residence: [place] address  Insurance: Medicaid  [facility] Eligible?: yes, I've sent a referral  Indication on facesheet for Hydralazine Hydrochloride/Isosorbide Dinitrate? no    DM dx? yes  Atrial arrhythmia dx? no  Current smoker? Yes    Dr. Nevarez was messaged about missing items: device screen, aldosterone antagonist, and documentation of smoking cessation counseling.    GDMT:    Where we stand right now with HFrEF MEASURES:    • Evidence Based Beta Blocker (bisoprolol, carvedilol, or toprol xl), for EF of 40% or less: carvedilol  • MICHELLE - I, for EF of 40% or less: lisinopril  • Aldosterone antagonist, for EF of 35% or less: missing  • Anticoagulation for atrial arrhythmia: n/a  • Glycemic control for DM + HF: insulin  • Lipid lowering medication for DM + HF: simvastatin  • Hydralazine Hydrochloride/Isosorbide Dinitrate: n/a  • Pneumococcal vaccine: given 10/26/21  • Influenza vaccine for current season: given 10/26/21  • Smoking cessation counseling documented: no  • Device screening: needs to be addressed- not clear how long her EF has  been less than 35%  • HF Education documented: I added it  • HF Care Plan documented: it is being used. Thank you, nursing!    Thank you, Altagracia, Cardio RN Navigator [id number]    HFrEF Specific Device Therapy Screening Tool   Dear Doctor,  Based on the Device Therapy Screen, this patient meets criteria for consideration of cardiac resynchronization therapy (BiV Pacer) and/or implantable cardiac defibrillator (ICD).    Criteria for Cardiac Resynchronization Therapy (must meet all 3)     · EF <35%: yes  · NYHA Class III or ambulatory Class IV: yes  · QRS Duration >120 ms: no is 82 ms      * CRT is a Class I recommendation for patients with Sinus Rhythm    Criteria for Implantable Cardiac Defibrillator (must meet all 3)     · EF <35%: yes  · > 40 days post MI: yes, per chart review, nSTEMI 9/4/21  · Post revascularization or non-ischemic dilated CMP > 3 months: unknown if low EF for greater than 3 months       * ICD therapy is a Class I recommendation    Source: [employer]- SCA Prevention Program Screening Tool  2013 ACC/ AHA Heart Failure Guidelines  Rev date: 12/2014

## 2021-10-28 NOTE — DISCHARGE SUMMARY
Discharge Summary    CHIEF COMPLAINT ON ADMISSION  Chief Complaint   Patient presents with   • CHF (Acute)     pt recent dx of CHF with new prescriptions. Presents for worsening swelling in last 3 days.        Reason for Admission  Sent by MD; Leg Swelling     Admission Date  10/26/2021    CODE STATUS  Full Code    HPI & HOSPITAL COURSE  This is a 50 y.o. female here with a past medical history of methamphetamine abuse, congestive heart failure with systolic dysfunction who presented with shortness of breath, nonproductive cough and lower extremity swelling.  Patient was treated with IV Lasix and nitroglycerin.  She was initially started on supplemental oxygen and was requiring 3 L.  She continues to use methamphetamines.  She was counseled on methamphetamine cessation.  The next day the patient was weaned off of oxygen and was saturating well on room air with improvement of her shortness of breath.  She will be discharged on Lasix, Coreg, lisinopril and aldosterone.  She has been instructed to follow-up with her PCP and heart failure clinic.          Therefore, she is discharged in fair and stable condition to home with close outpatient follow-up.      Discharge Date  10/27/2021    FOLLOW UP ITEMS POST DISCHARGE  Follow-up with PCP    DISCHARGE DIAGNOSES  Active Problems:    Abnormal liver function tests POA: Yes    Chronic obstructive pulmonary disease (HCC) POA: Yes    Left heart failure (HCC) POA: Yes    Methamphetamine dependence (HCC) POA: Yes    Type 2 diabetes mellitus without complications (HCC) POA: Yes  Resolved Problems:    * No resolved hospital problems. *      FOLLOW UP  Future Appointments   Date Time Provider Department Center   12/7/2021  1:40 PM Reji Rodrigez M.D. [facility] None     No follow-up provider specified.    MEDICATIONS ON DISCHARGE     Medication List      START taking these medications      Instructions   potassium chloride SA 10 MEQ Tbcr  Commonly known as: K-DUR   Doctor's comments:  Dc 20 MEQ/D AND ONLY PRESCRIBE 10 MEQ/D  Take 1 Tablet by mouth every day.  Dose: 10 mEq     spironolactone 25 MG Tabs  Commonly known as: ALDACTONE   Take 1 Tablet by mouth every day.  Dose: 25 mg        CONTINUE taking these medications      Instructions   albuterol 108 (90 Base) MCG/ACT Aers inhalation aerosol   Inhale 2 Puffs every 6 hours as needed for Shortness of Breath.  Dose: 2 Puff     Aspirin 81 81 MG EC tablet  Generic drug: aspirin   Take 1 Tablet by mouth every day.  Dose: 1 Tablet     carvedilol 6.25 MG Tabs  Commonly known as: COREG   Take 1 Tablet by mouth 2 times a day.  Dose: 1 Tablet     furosemide 20 MG Tabs  Commonly known as: LASIX   Take 1 Tablet by mouth 2 times a day.  Dose: 20 mg     Incruse Ellipta 62.5 MCG/INH Aepb  Generic drug: Umeclidinium Bromide   Inhale 1 Ampule every day.  Dose: 1 Ampule     levothyroxine 50 MCG Tabs  Commonly known as: SYNTHROID   Take 50 mcg by mouth every day.  Dose: 50 mcg     lisinopril 40 MG tablet  Commonly known as: PRINIVIL   Take 1 Tablet by mouth every day.  Dose: 1 Tablet     metFORMIN 500 MG Tabs  Commonly known as: GLUCOPHAGE   Take 1 Tablet by mouth 2 times a day.  Dose: 1 Tablet     simvastatin 40 MG Tabs  Commonly known as: ZOCOR   Take 1 Tablet by mouth every day.  Dose: 1 Tablet            Allergies  Allergies   Allergen Reactions   • Naltrexone Swelling     All over body   • Nkda [No Known Drug Allergy]        DIET  Orders Placed This Encounter   Procedures   • Diet Order Diet: 2 Gram Sodium; Second Modifier: (optional): Consistent CHO (Diabetic); Fluid modifications: (optional): 1200 ml Fluid Restriction     Standing Status:   Standing     Number of Occurrences:   1     Order Specific Question:   Diet:     Answer:   2 Gram Sodium [7]     Order Specific Question:   Second Modifier: (optional)     Answer:   Consistent CHO (Diabetic) [4]     Order Specific Question:   Fluid modifications: (optional)     Answer:   1200 ml Fluid Restriction [8]        ACTIVITY  As tolerated.  Weight bearing as tolerated    CONSULTATIONS  None    PROCEDURES  None    LABORATORY  Lab Results   Component Value Date    SODIUM 137 10/27/2021    POTASSIUM 3.9 10/27/2021    CHLORIDE 96 10/27/2021    CO2 35 (H) 10/27/2021    GLUCOSE 259 (H) 10/27/2021    BUN 17 10/27/2021    CREATININE 1.14 10/27/2021        Lab Results   Component Value Date    WBC 7.5 10/27/2021    HEMOGLOBIN 13.4 10/27/2021    HEMATOCRIT 42.1 10/27/2021    PLATELETCT 316 10/27/2021        Total time of the discharge process exceeds 32 minutes.

## 2021-10-28 NOTE — PROGRESS NOTES
Went in to discharge patient she was eating a candy bar and drinking fluids from company instructions given discharge paperwork I reviewed discharge instructions patient states I am going to eat it anyway I  am going home  anyway

## 2021-10-28 NOTE — DISCHARGE INSTRUCTIONS
Discharge Instructions    Discharged to home by car with friend. Discharged via wheelchair, hospital escort: Yes.  Special equipment needed: Not Applicable    Be sure to schedule a follow-up appointment with your primary care doctor or any specialists as instructed.     Discharge Plan:   Diet Plan: Discussed  Activity Level: Discussed  Confirmed Follow up Appointment: Patient to Call and Schedule Appointment  Confirmed Symptoms Management: Discussed  Medication Reconciliation Updated: Yes  Influenza Vaccine Given - only chart on this line when given: Influenza Vaccine Given (See MAR)    I understand that a diet low in cholesterol, fat, and sodium is recommended for good health. Unless I have been given specific instructions below for another diet, I accept this instruction as my diet prescription.   Other diet: low salt    Special Instructions: None    · Is patient discharged on Warfarin / Coumadin?   No     Depression / Suicide Risk    As you are discharged from this [facility] facility, it is important to learn how to keep safe from harming yourself.    Recognize the warning signs:  · Abrupt changes in personality, positive or negative- including increase in energy   · Giving away possessions  · Change in eating patterns- significant weight changes-  positive or negative  · Change in sleeping patterns- unable to sleep or sleeping all the time   · Unwillingness or inability to communicate  · Depression  · Unusual sadness, discouragement and loneliness  · Talk of wanting to die  · Neglect of personal appearance   · Rebelliousness- reckless behavior  · Withdrawal from people/activities they love  · Confusion- inability to concentrate     If you or a loved one observes any of these behaviors or has concerns about self-harm, here's what you can do:  · Talk about it- your feelings and reasons for harming yourself  · Remove any means that you might use to hurt yourself (examples: pills, rope, extension cords,  firearm)  · Get professional help from the community (Mental Health, Substance Abuse, psychological counseling)  · Do not be alone:Call your Safe Contact- someone whom you trust who will be there for you.  · Call your local CRISIS HOTLINE 252-2360 or 702-022-3812  · Call your local Children's Mobile Crisis Response Team [place] (632) 354-3633 or www.[employer]  · Call the toll free National Suicide Prevention Hotlines   · National Suicide Prevention Lifeline 954-866-RCYW (1018)  · National Hope Line Network 800-SUICIDE (012-0262)

## 2021-10-28 NOTE — TELEPHONE ENCOUNTER
Mer called our answering service asking for pain medications. I told her we as a cardiology group have not see her yet and thus I cannot legally prescribe her medications. I advised she contact her PCP or urgent care. Any severe issues overnight she can present to an emergency room. Recommended tylenol as well.

## 2021-11-03 ENCOUNTER — APPOINTMENT (OUTPATIENT)
Dept: RADIOLOGY | Facility: MEDICAL CENTER | Age: 51
DRG: 291 | End: 2021-11-03
Attending: EMERGENCY MEDICINE
Payer: MEDICAID

## 2021-11-03 ENCOUNTER — HOSPITAL ENCOUNTER (INPATIENT)
Facility: MEDICAL CENTER | Age: 51
LOS: 1 days | DRG: 291 | End: 2021-11-04
Attending: EMERGENCY MEDICINE | Admitting: FAMILY MEDICINE
Payer: MEDICAID

## 2021-11-03 DIAGNOSIS — I50.23 ACUTE ON CHRONIC SYSTOLIC CONGESTIVE HEART FAILURE (HCC): ICD-10-CM

## 2021-11-03 DIAGNOSIS — Z91.199 NONCOMPLIANCE: ICD-10-CM

## 2021-11-03 DIAGNOSIS — R09.02 HYPOXIA: ICD-10-CM

## 2021-11-03 PROCEDURE — 93005 ELECTROCARDIOGRAM TRACING: CPT | Performed by: EMERGENCY MEDICINE

## 2021-11-03 PROCEDURE — 99285 EMERGENCY DEPT VISIT HI MDM: CPT

## 2021-11-03 RX ORDER — FUROSEMIDE 10 MG/ML
20 INJECTION INTRAMUSCULAR; INTRAVENOUS ONCE
Status: COMPLETED | OUTPATIENT
Start: 2021-11-04 | End: 2021-11-04

## 2021-11-03 RX ORDER — CARVEDILOL 6.25 MG/1
6.25 TABLET ORAL ONCE
Status: COMPLETED | OUTPATIENT
Start: 2021-11-04 | End: 2021-11-04

## 2021-11-03 ASSESSMENT — FIBROSIS 4 INDEX: FIB4 SCORE: 0.9

## 2021-11-03 ASSESSMENT — PAIN SCALES - WONG BAKER: WONGBAKER_NUMERICALRESPONSE: DOESN'T HURT AT ALL

## 2021-11-04 ENCOUNTER — HOSPITAL ENCOUNTER (EMERGENCY)
Facility: MEDICAL CENTER | Age: 51
DRG: 291 | End: 2021-11-05
Payer: MEDICAID

## 2021-11-04 VITALS
SYSTOLIC BLOOD PRESSURE: 142 MMHG | BODY MASS INDEX: 30.41 KG/M2 | RESPIRATION RATE: 18 BRPM | TEMPERATURE: 98.1 F | DIASTOLIC BLOOD PRESSURE: 111 MMHG | HEART RATE: 106 BPM | HEIGHT: 65 IN | OXYGEN SATURATION: 90 %

## 2021-11-04 VITALS
SYSTOLIC BLOOD PRESSURE: 137 MMHG | RESPIRATION RATE: 14 BRPM | DIASTOLIC BLOOD PRESSURE: 111 MMHG | TEMPERATURE: 97.3 F | OXYGEN SATURATION: 100 % | HEART RATE: 62 BPM | WEIGHT: 182.76 LBS | BODY MASS INDEX: 34.51 KG/M2 | HEIGHT: 61 IN

## 2021-11-04 PROBLEM — I50.23 ACUTE ON CHRONIC CLINICAL SYSTOLIC HEART FAILURE (HCC): Status: ACTIVE | Noted: 2021-11-04

## 2021-11-04 LAB
ALBUMIN SERPL BCP-MCNC: 3.3 G/DL (ref 3.2–4.9)
ALBUMIN SERPL BCP-MCNC: 3.6 G/DL (ref 3.2–4.9)
ALBUMIN/GLOB SERPL: 0.8 G/DL
ALBUMIN/GLOB SERPL: 1 G/DL
ALP SERPL-CCNC: 143 U/L (ref 30–99)
ALP SERPL-CCNC: 151 U/L (ref 30–99)
ALT SERPL-CCNC: 52 U/L (ref 2–50)
ALT SERPL-CCNC: 60 U/L (ref 2–50)
AMPHET UR QL SCN: POSITIVE
ANION GAP SERPL CALC-SCNC: 12 MMOL/L (ref 7–16)
ANION GAP SERPL CALC-SCNC: 13 MMOL/L (ref 7–16)
AST SERPL-CCNC: 57 U/L (ref 12–45)
AST SERPL-CCNC: 67 U/L (ref 12–45)
BARBITURATES UR QL SCN: NEGATIVE
BASOPHILS # BLD AUTO: 0.6 % (ref 0–1.8)
BASOPHILS # BLD AUTO: 0.8 % (ref 0–1.8)
BASOPHILS # BLD: 0.04 K/UL (ref 0–0.12)
BASOPHILS # BLD: 0.07 K/UL (ref 0–0.12)
BENZODIAZ UR QL SCN: NEGATIVE
BILIRUB SERPL-MCNC: 0.3 MG/DL (ref 0.1–1.5)
BILIRUB SERPL-MCNC: 0.4 MG/DL (ref 0.1–1.5)
BUN SERPL-MCNC: 21 MG/DL (ref 8–22)
BUN SERPL-MCNC: 29 MG/DL (ref 8–22)
BURR CELLS BLD QL SMEAR: NORMAL
BZE UR QL SCN: NEGATIVE
CALCIUM SERPL-MCNC: 8.7 MG/DL (ref 8.5–10.5)
CALCIUM SERPL-MCNC: 8.8 MG/DL (ref 8.5–10.5)
CANNABINOIDS UR QL SCN: NEGATIVE
CHLORIDE SERPL-SCNC: 100 MMOL/L (ref 96–112)
CHLORIDE SERPL-SCNC: 100 MMOL/L (ref 96–112)
CO2 SERPL-SCNC: 23 MMOL/L (ref 20–33)
CO2 SERPL-SCNC: 25 MMOL/L (ref 20–33)
CREAT SERPL-MCNC: 1.03 MG/DL (ref 0.5–1.4)
CREAT SERPL-MCNC: 1.42 MG/DL (ref 0.5–1.4)
EKG IMPRESSION: NORMAL
EKG IMPRESSION: NORMAL
EOSINOPHIL # BLD AUTO: 0 K/UL (ref 0–0.51)
EOSINOPHIL # BLD AUTO: 0.09 K/UL (ref 0–0.51)
EOSINOPHIL NFR BLD: 0 % (ref 0–6.9)
EOSINOPHIL NFR BLD: 1.4 % (ref 0–6.9)
ERYTHROCYTE [DISTWIDTH] IN BLOOD BY AUTOMATED COUNT: 45.2 FL (ref 35.9–50)
ERYTHROCYTE [DISTWIDTH] IN BLOOD BY AUTOMATED COUNT: 45.6 FL (ref 35.9–50)
GLOBULIN SER CALC-MCNC: 3.5 G/DL (ref 1.9–3.5)
GLOBULIN SER CALC-MCNC: 3.9 G/DL (ref 1.9–3.5)
GLUCOSE BLD-MCNC: 169 MG/DL (ref 65–99)
GLUCOSE BLD-MCNC: 261 MG/DL (ref 65–99)
GLUCOSE SERPL-MCNC: 286 MG/DL (ref 65–99)
GLUCOSE SERPL-MCNC: 309 MG/DL (ref 65–99)
HCT VFR BLD AUTO: 41.4 % (ref 37–47)
HCT VFR BLD AUTO: 42.8 % (ref 37–47)
HGB BLD-MCNC: 13.3 G/DL (ref 12–16)
HGB BLD-MCNC: 13.5 G/DL (ref 12–16)
IMM GRANULOCYTES # BLD AUTO: 0.02 K/UL (ref 0–0.11)
IMM GRANULOCYTES NFR BLD AUTO: 0.3 % (ref 0–0.9)
LYMPHOCYTES # BLD AUTO: 1.95 K/UL (ref 1–4.8)
LYMPHOCYTES # BLD AUTO: 2.39 K/UL (ref 1–4.8)
LYMPHOCYTES NFR BLD: 22.7 % (ref 22–41)
LYMPHOCYTES NFR BLD: 37.1 % (ref 22–41)
MANUAL DIFF BLD: NORMAL
MCH RBC QN AUTO: 27.3 PG (ref 27–33)
MCH RBC QN AUTO: 27.8 PG (ref 27–33)
MCHC RBC AUTO-ENTMCNC: 31.5 G/DL (ref 33.6–35)
MCHC RBC AUTO-ENTMCNC: 32.1 G/DL (ref 33.6–35)
MCV RBC AUTO: 86.4 FL (ref 81.4–97.8)
MCV RBC AUTO: 86.6 FL (ref 81.4–97.8)
METHADONE UR QL SCN: NEGATIVE
MONOCYTES # BLD AUTO: 0.15 K/UL (ref 0–0.85)
MONOCYTES # BLD AUTO: 0.56 K/UL (ref 0–0.85)
MONOCYTES NFR BLD AUTO: 1.7 % (ref 0–13.4)
MONOCYTES NFR BLD AUTO: 8.7 % (ref 0–13.4)
MORPHOLOGY BLD-IMP: NORMAL
NEUTROPHILS # BLD AUTO: 3.34 K/UL (ref 2–7.15)
NEUTROPHILS # BLD AUTO: 6.43 K/UL (ref 2–7.15)
NEUTROPHILS NFR BLD: 51.9 % (ref 44–72)
NEUTROPHILS NFR BLD: 74.8 % (ref 44–72)
NRBC # BLD AUTO: 0 K/UL
NRBC # BLD AUTO: 0 K/UL
NRBC BLD-RTO: 0 /100 WBC
NRBC BLD-RTO: 0 /100 WBC
NT-PROBNP SERPL IA-MCNC: 2887 PG/ML (ref 0–125)
OPIATES UR QL SCN: NEGATIVE
OXYCODONE UR QL SCN: NEGATIVE
PCP UR QL SCN: NEGATIVE
PLATELET # BLD AUTO: 307 K/UL (ref 164–446)
PLATELET # BLD AUTO: 340 K/UL (ref 164–446)
PLATELET BLD QL SMEAR: NORMAL
PMV BLD AUTO: 9.3 FL (ref 9–12.9)
PMV BLD AUTO: 9.5 FL (ref 9–12.9)
POIKILOCYTOSIS BLD QL SMEAR: NORMAL
POLYCHROMASIA BLD QL SMEAR: NORMAL
POTASSIUM SERPL-SCNC: 3.9 MMOL/L (ref 3.6–5.5)
POTASSIUM SERPL-SCNC: 3.9 MMOL/L (ref 3.6–5.5)
PROPOXYPH UR QL SCN: NEGATIVE
PROT SERPL-MCNC: 7.1 G/DL (ref 6–8.2)
PROT SERPL-MCNC: 7.2 G/DL (ref 6–8.2)
RBC # BLD AUTO: 4.79 M/UL (ref 4.2–5.4)
RBC # BLD AUTO: 4.94 M/UL (ref 4.2–5.4)
RBC BLD AUTO: PRESENT
SODIUM SERPL-SCNC: 136 MMOL/L (ref 135–145)
SODIUM SERPL-SCNC: 137 MMOL/L (ref 135–145)
TARGETS BLD QL SMEAR: NORMAL
TROPONIN T SERPL-MCNC: 32 NG/L (ref 6–19)
WBC # BLD AUTO: 6.4 K/UL (ref 4.8–10.8)
WBC # BLD AUTO: 8.6 K/UL (ref 4.8–10.8)

## 2021-11-04 PROCEDURE — A9270 NON-COVERED ITEM OR SERVICE: HCPCS | Performed by: STUDENT IN AN ORGANIZED HEALTH CARE EDUCATION/TRAINING PROGRAM

## 2021-11-04 PROCEDURE — 71045 X-RAY EXAM CHEST 1 VIEW: CPT

## 2021-11-04 PROCEDURE — 82962 GLUCOSE BLOOD TEST: CPT

## 2021-11-04 PROCEDURE — 85025 COMPLETE CBC W/AUTO DIFF WBC: CPT

## 2021-11-04 PROCEDURE — 96374 THER/PROPH/DIAG INJ IV PUSH: CPT

## 2021-11-04 PROCEDURE — 80053 COMPREHEN METABOLIC PANEL: CPT | Mod: 91

## 2021-11-04 PROCEDURE — 99221 1ST HOSP IP/OBS SF/LOW 40: CPT | Mod: GC | Performed by: FAMILY MEDICINE

## 2021-11-04 PROCEDURE — 700102 HCHG RX REV CODE 250 W/ 637 OVERRIDE(OP): Performed by: EMERGENCY MEDICINE

## 2021-11-04 PROCEDURE — 700102 HCHG RX REV CODE 250 W/ 637 OVERRIDE(OP): Performed by: STUDENT IN AN ORGANIZED HEALTH CARE EDUCATION/TRAINING PROGRAM

## 2021-11-04 PROCEDURE — 700111 HCHG RX REV CODE 636 W/ 250 OVERRIDE (IP): Performed by: STUDENT IN AN ORGANIZED HEALTH CARE EDUCATION/TRAINING PROGRAM

## 2021-11-04 PROCEDURE — 84484 ASSAY OF TROPONIN QUANT: CPT

## 2021-11-04 PROCEDURE — 80053 COMPREHEN METABOLIC PANEL: CPT

## 2021-11-04 PROCEDURE — 80307 DRUG TEST PRSMV CHEM ANLYZR: CPT

## 2021-11-04 PROCEDURE — 85007 BL SMEAR W/DIFF WBC COUNT: CPT

## 2021-11-04 PROCEDURE — 83880 ASSAY OF NATRIURETIC PEPTIDE: CPT

## 2021-11-04 PROCEDURE — A9270 NON-COVERED ITEM OR SERVICE: HCPCS | Performed by: EMERGENCY MEDICINE

## 2021-11-04 PROCEDURE — 93005 ELECTROCARDIOGRAM TRACING: CPT

## 2021-11-04 PROCEDURE — 700111 HCHG RX REV CODE 636 W/ 250 OVERRIDE (IP): Performed by: EMERGENCY MEDICINE

## 2021-11-04 PROCEDURE — 770020 HCHG ROOM/CARE - TELE (206)

## 2021-11-04 PROCEDURE — 96376 TX/PRO/DX INJ SAME DRUG ADON: CPT

## 2021-11-04 PROCEDURE — 85027 COMPLETE CBC AUTOMATED: CPT

## 2021-11-04 PROCEDURE — 96372 THER/PROPH/DIAG INJ SC/IM: CPT

## 2021-11-04 RX ORDER — LORAZEPAM 2 MG/ML
1.5 INJECTION INTRAMUSCULAR
Status: DISCONTINUED | OUTPATIENT
Start: 2021-11-04 | End: 2021-11-04 | Stop reason: HOSPADM

## 2021-11-04 RX ORDER — BISACODYL 10 MG
10 SUPPOSITORY, RECTAL RECTAL
Status: DISCONTINUED | OUTPATIENT
Start: 2021-11-04 | End: 2021-11-04 | Stop reason: HOSPADM

## 2021-11-04 RX ORDER — LORAZEPAM 2 MG/ML
2 INJECTION INTRAMUSCULAR
Status: DISCONTINUED | OUTPATIENT
Start: 2021-11-04 | End: 2021-11-04 | Stop reason: HOSPADM

## 2021-11-04 RX ORDER — LORAZEPAM 2 MG/1
4 TABLET ORAL
Status: DISCONTINUED | OUTPATIENT
Start: 2021-11-04 | End: 2021-11-04 | Stop reason: HOSPADM

## 2021-11-04 RX ORDER — ALBUTEROL SULFATE 90 UG/1
2 AEROSOL, METERED RESPIRATORY (INHALATION) EVERY 6 HOURS PRN
Status: DISCONTINUED | OUTPATIENT
Start: 2021-11-04 | End: 2021-11-04 | Stop reason: HOSPADM

## 2021-11-04 RX ORDER — AMOXICILLIN 250 MG
2 CAPSULE ORAL 2 TIMES DAILY
Status: DISCONTINUED | OUTPATIENT
Start: 2021-11-04 | End: 2021-11-04 | Stop reason: HOSPADM

## 2021-11-04 RX ORDER — LORAZEPAM 0.5 MG/1
0.5 TABLET ORAL EVERY 4 HOURS PRN
Status: DISCONTINUED | OUTPATIENT
Start: 2021-11-04 | End: 2021-11-04 | Stop reason: HOSPADM

## 2021-11-04 RX ORDER — ACETAMINOPHEN 325 MG/1
650 TABLET ORAL EVERY 6 HOURS PRN
Status: DISCONTINUED | OUTPATIENT
Start: 2021-11-04 | End: 2021-11-04 | Stop reason: HOSPADM

## 2021-11-04 RX ORDER — DEXTROSE MONOHYDRATE 25 G/50ML
50 INJECTION, SOLUTION INTRAVENOUS
Status: DISCONTINUED | OUTPATIENT
Start: 2021-11-04 | End: 2021-11-04 | Stop reason: HOSPADM

## 2021-11-04 RX ORDER — CARVEDILOL 6.25 MG/1
6.25 TABLET ORAL 2 TIMES DAILY
Status: DISCONTINUED | OUTPATIENT
Start: 2021-11-04 | End: 2021-11-04 | Stop reason: HOSPADM

## 2021-11-04 RX ORDER — LORAZEPAM 2 MG/1
2 TABLET ORAL
Status: DISCONTINUED | OUTPATIENT
Start: 2021-11-04 | End: 2021-11-04 | Stop reason: HOSPADM

## 2021-11-04 RX ORDER — LORAZEPAM 2 MG/ML
0.5 INJECTION INTRAMUSCULAR EVERY 4 HOURS PRN
Status: DISCONTINUED | OUTPATIENT
Start: 2021-11-04 | End: 2021-11-04 | Stop reason: HOSPADM

## 2021-11-04 RX ORDER — LISINOPRIL 20 MG/1
40 TABLET ORAL DAILY
Status: DISCONTINUED | OUTPATIENT
Start: 2021-11-04 | End: 2021-11-04 | Stop reason: HOSPADM

## 2021-11-04 RX ORDER — GAUZE BANDAGE 2" X 2"
100 BANDAGE TOPICAL DAILY
Status: DISCONTINUED | OUTPATIENT
Start: 2021-11-04 | End: 2021-11-04 | Stop reason: HOSPADM

## 2021-11-04 RX ORDER — SIMVASTATIN 40 MG
40 TABLET ORAL DAILY
Status: DISCONTINUED | OUTPATIENT
Start: 2021-11-04 | End: 2021-11-04 | Stop reason: HOSPADM

## 2021-11-04 RX ORDER — LEVOTHYROXINE SODIUM 0.05 MG/1
50 TABLET ORAL DAILY
Status: DISCONTINUED | OUTPATIENT
Start: 2021-11-04 | End: 2021-11-04 | Stop reason: HOSPADM

## 2021-11-04 RX ORDER — SPIRONOLACTONE 25 MG/1
25 TABLET ORAL DAILY
Status: DISCONTINUED | OUTPATIENT
Start: 2021-11-04 | End: 2021-11-04 | Stop reason: HOSPADM

## 2021-11-04 RX ORDER — NAPROXEN 250 MG/1
500 TABLET ORAL
COMMUNITY
End: 2021-11-06

## 2021-11-04 RX ORDER — FUROSEMIDE 20 MG/1
20 TABLET ORAL 2 TIMES DAILY
Status: DISCONTINUED | OUTPATIENT
Start: 2021-11-04 | End: 2021-11-04 | Stop reason: HOSPADM

## 2021-11-04 RX ORDER — HYDRALAZINE HYDROCHLORIDE 20 MG/ML
20 INJECTION INTRAMUSCULAR; INTRAVENOUS EVERY 6 HOURS PRN
Status: DISCONTINUED | OUTPATIENT
Start: 2021-11-04 | End: 2021-11-04 | Stop reason: HOSPADM

## 2021-11-04 RX ORDER — FOLIC ACID 1 MG/1
1 TABLET ORAL DAILY
Status: DISCONTINUED | OUTPATIENT
Start: 2021-11-04 | End: 2021-11-04 | Stop reason: HOSPADM

## 2021-11-04 RX ORDER — FUROSEMIDE 10 MG/ML
40 INJECTION INTRAMUSCULAR; INTRAVENOUS
Status: DISCONTINUED | OUTPATIENT
Start: 2021-11-04 | End: 2021-11-04 | Stop reason: HOSPADM

## 2021-11-04 RX ORDER — POLYETHYLENE GLYCOL 3350 17 G/17G
1 POWDER, FOR SOLUTION ORAL
Status: DISCONTINUED | OUTPATIENT
Start: 2021-11-04 | End: 2021-11-04 | Stop reason: HOSPADM

## 2021-11-04 RX ORDER — LORAZEPAM 1 MG/1
1 TABLET ORAL EVERY 4 HOURS PRN
Status: DISCONTINUED | OUTPATIENT
Start: 2021-11-04 | End: 2021-11-04 | Stop reason: HOSPADM

## 2021-11-04 RX ORDER — LORAZEPAM 2 MG/ML
1 INJECTION INTRAMUSCULAR
Status: DISCONTINUED | OUTPATIENT
Start: 2021-11-04 | End: 2021-11-04 | Stop reason: HOSPADM

## 2021-11-04 RX ORDER — POTASSIUM CHLORIDE 20 MEQ/1
10 TABLET, EXTENDED RELEASE ORAL DAILY
Status: DISCONTINUED | OUTPATIENT
Start: 2021-11-04 | End: 2021-11-04 | Stop reason: HOSPADM

## 2021-11-04 RX ADMIN — INSULIN HUMAN 1 UNITS: 100 INJECTION, SOLUTION PARENTERAL at 11:43

## 2021-11-04 RX ADMIN — CARVEDILOL 6.25 MG: 6.25 TABLET, FILM COATED ORAL at 07:47

## 2021-11-04 RX ADMIN — FUROSEMIDE 20 MG: 20 INJECTION, SOLUTION INTRAMUSCULAR; INTRAVENOUS at 00:00

## 2021-11-04 RX ADMIN — SPIRONOLACTONE 25 MG: 25 TABLET ORAL at 07:47

## 2021-11-04 RX ADMIN — FUROSEMIDE 40 MG: 10 INJECTION, SOLUTION INTRAMUSCULAR; INTRAVENOUS at 07:28

## 2021-11-04 RX ADMIN — ASPIRIN 81 MG: 81 TABLET, COATED ORAL at 07:47

## 2021-11-04 RX ADMIN — INSULIN HUMAN 3 UNITS: 100 INJECTION, SOLUTION PARENTERAL at 08:26

## 2021-11-04 RX ADMIN — LEVOTHYROXINE SODIUM 50 MCG: 0.05 TABLET ORAL at 07:27

## 2021-11-04 RX ADMIN — LISINOPRIL 40 MG: 20 TABLET ORAL at 07:26

## 2021-11-04 RX ADMIN — SIMVASTATIN 40 MG: 40 TABLET, FILM COATED ORAL at 07:27

## 2021-11-04 RX ADMIN — ENOXAPARIN SODIUM 40 MG: 40 INJECTION SUBCUTANEOUS at 07:47

## 2021-11-04 RX ADMIN — POTASSIUM CHLORIDE 10 MEQ: 1500 TABLET, EXTENDED RELEASE ORAL at 07:27

## 2021-11-04 RX ADMIN — CARVEDILOL 6.25 MG: 6.25 TABLET, FILM COATED ORAL at 00:15

## 2021-11-04 ASSESSMENT — PAIN DESCRIPTION - PAIN TYPE: TYPE: ACUTE PAIN

## 2021-11-04 ASSESSMENT — COGNITIVE AND FUNCTIONAL STATUS - GENERAL
MOBILITY SCORE: 22
CLIMB 3 TO 5 STEPS WITH RAILING: A LOT
SUGGESTED CMS G CODE MODIFIER DAILY ACTIVITY: CH
DAILY ACTIVITIY SCORE: 24
SUGGESTED CMS G CODE MODIFIER MOBILITY: CJ

## 2021-11-04 ASSESSMENT — COPD QUESTIONNAIRES
COPD SCREENING SCORE: 6
HAVE YOU SMOKED AT LEAST 100 CIGARETTES IN YOUR ENTIRE LIFE: YES
DO YOU EVER COUGH UP ANY MUCUS OR PHLEGM?: YES, EVERY DAY
DURING THE PAST 4 WEEKS HOW MUCH DID YOU FEEL SHORT OF BREATH: SOME OF THE TIME

## 2021-11-04 ASSESSMENT — PATIENT HEALTH QUESTIONNAIRE - PHQ9
SUM OF ALL RESPONSES TO PHQ9 QUESTIONS 1 AND 2: 0
2. FEELING DOWN, DEPRESSED, IRRITABLE, OR HOPELESS: NOT AT ALL
1. LITTLE INTEREST OR PLEASURE IN DOING THINGS: NOT AT ALL

## 2021-11-04 ASSESSMENT — FIBROSIS 4 INDEX
FIB4 SCORE: 1.14
FIB4 SCORE: 1.14
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
## 2021-11-04 NOTE — ASSESSMENT & PLAN NOTE
Patient has methamphetamine induced systolic heart failure with recent echo completed 10/27/2021 showing ejection fraction of 30%.  Patient was recently hospitalized and discharged on 10/26 for acute heart failure exacerbation.  Unclear on whether patient was taking medications.  Patient did admit to taking methamphetamine on 10/26.  Patient fluid overloaded on exam with a BNP of 2887.  Also minor elevation of troponin which is likely secondary to demand ischemia.  Patient denies chest pain at this time.  Plan:  -Initiate diuresis with IV 40 mg Lasix twice daily  -Accurate I's and O's  -Daily weights  -Fluid restriction of 1.5 L  -Salt restriction of 2 g  -Cardiac diet  -Trend troponins  -Consider repeat echo, last was 5 days prior  -Continue home heart failure meds including: carvedilol, furosemide, lisinopril, simvastatin, spironolactone

## 2021-11-04 NOTE — PROGRESS NOTES
Received call from nursing that patient actively leaving AMA after she became aggressive with staff. Security was called to escort her safely to exit and patient was gone before I was able to reach patient's room.     Malika Maldonado MD  PGY-2 [facility] Resident

## 2021-11-04 NOTE — ED NOTES
PT continues to remove monitors pulse ox and remains restless VSS at this time and awaiting transport yue tele floor when given a bed.

## 2021-11-04 NOTE — ED TRIAGE NOTES
"Chief Complaint   Patient presents with   • Shortness of Breath     PT BIB [facility] with c/o not able to get home O2 after DC from hospital x 3 days.  PT also reports ankle swelling with Hx of CHF.  PT not compliant with diet or meds.     Blood Pressure 142/110   Pulse (Abnormal) 110   Temperature 36.8 °C (98.2 °F) (Temporal)   Respiration 20   Height 1.549 m (5' 0.98\")   Weight 84.4 kg (186 lb 1.1 oz)   Oxygen Saturation 97% Comment: 82 on RA  Body Mass Index 35.18 kg/m²     "

## 2021-11-04 NOTE — ASSESSMENT & PLAN NOTE
No right upper quadrant pain reported by patient.  Abnormalities were reported on last admission.  No work-up could be found on limited chart review.  Likely secondary to TERAN/NAFLD, but consider right upper quadrant ultrasound and hepatitis work-up.

## 2021-11-04 NOTE — ED NOTES
PT restless taking off monitor BP and pulse Ox and refusing to put back on.  PT wanting to go smoke go to cafeteria and got to her car in the parking lot.  PT will not follow direction and was told that she would need to sign out AMA if she wanted to do any of those things.

## 2021-11-04 NOTE — ASSESSMENT & PLAN NOTE
Patient presented with acute hypoxic respiratory failure secondary to CHF exacerbation.  Plan:  -Titrate supplementary oxygen as needed to keep saturations above 90%  -If patient remains persistently tachycardic, consider D-dimer and further work-up for PE

## 2021-11-04 NOTE — ED PROVIDER NOTES
ED Provider Note    CHIEF COMPLAINT  Chief Complaint   Patient presents with   • Shortness of Breath     PT BIB [facility] with c/o not able to get home O2 after DC from hospital x 3 days.  PT also reports ankle swelling with Hx of CHF.  PT not compliant with diet or meds.       HPI  Mer Arriaza is a 50 y.o. female who presents to the emergency department chief complaint of worsening shortness of breath. The patient was discharged from the hospital on the 27th of last month after being admitted for fluid overload she states that since going home she has been using amphetamines and she also has not been sticking to her liquid restrictions. She states has been taking her medications but has not been on options been more short of breath and feeling fatigued and having bilateral pain in her feet mild swelling.    REVIEW OF SYSTEMS  Positives as above. Pertinent negatives include chest pain fevers chills cough congestion nausea vomiting abdominal pain flank pain  All other review of systems are negative    PAST MEDICAL HISTORY   has a past medical history of CAD (coronary artery disease), Congestive heart failure (HCC), Hypertension, and Psychiatric disorder.    SOCIAL HISTORY  Social History     Tobacco Use   • Smoking status: Current Every Day Smoker     Packs/day: 1.00     Types: Cigarettes     Start date: 1983   • Smokeless tobacco: Never Used   Substance and Sexual Activity   • Alcohol use: No   • Drug use: Yes     Types: Inhaled     Comment: occassional meth use, last 2 days ago   • Sexual activity: Not on file       SURGICAL HISTORY   has a past surgical history that includes other orthopedic surgery and abdominal hysterectomy total.    CURRENT MEDICATIONS  Home Medications    **Home medications have not yet been reviewed for this encounter**         ALLERGIES  Allergies   Allergen Reactions   • Naltrexone Swelling     All over body   • Nkda [No Known Drug Allergy]        PHYSICAL EXAM  VITAL SIGNS: BP  "142/110   Pulse (!) 110   Temp 36.8 °C (98.2 °F) (Temporal)   Resp 20   Ht 1.549 m (5' 0.98\")   Wt 84.4 kg (186 lb 1.1 oz)   SpO2 97% Comment: 82 on RA  BMI 35.18 kg/m²    Pulse ox interpretation: I interpret this pulse ox as low 82% on room air improved with 4 L nasal cannula  Constitutional: Alert in no distress  HENT: Normocephalic atraumatic, MMM  Eyes: PER, Conjunctiva normal, Non-icteric.   Neck: Normal range of motion, No tenderness, Supple, No stridor.   Cardiovascular: Regular rate and rhythm, no murmurs.   Thorax & Lungs: Coarse rales heard bilaterally no respiratory distress, No wheezing, No chest tenderness.   Abdomen: Bowel sounds normal, Soft, No tenderness, No pulsatile masses. No peritoneal signs.  Skin: Warm, Dry, No erythema, No rash.   Back: No bony tenderness, No CVA tenderness.   Extremities/MSK: Intact equal distal pulses, No edema, No tenderness, No cyanosis, no major deformities noted  Neurologic: Alert and oriented x3, No focal deficits noted.       DIFFERENTIAL DIAGNOSIS AND WORK UP PLAN    This is a 50 y.o. female who presents with a history of an EF of 30% secondary to amphetamine use and global dyskinesia, patient's been noncompliant with her medications and continued use amphetamines unfortunately she is here with likely evidence of fluid overload and hypoxia. On her last discharge she was weaned off oxygen was able to be discharged on room air. Unfortunately she presented 82% on room air she required nasal cannula she is hypertensive but does not appear to be in hypertensive emergency does not require nitro drip or BiPAP. She will be treated with IV Lasix laboratory analysis and likely hospitalization    DIAGNOSTIC STUDIES / PROCEDURES    EKG  Results for orders placed or performed during the hospital encounter of 11/03/21   EKG (NOW)   Result Value Ref Range    Report       [facility] Emergency Dept.    Test Date:  2021-11-03  Pt Name:    ZEESHAN VASQUEZ   "          Department: ER  MRN:        [id number]                      Room:       [facility]  Gender:     Female                       Technician: [id number]  :        1970                   Requested By:SARA REMY  Order #:    [id number]                    Reading MD: Sara Remy MD    Measurements  Intervals                                Axis  Rate:       107                          P:          64  OR:         140                          QRS:        -29  QRSD:       81                           T:          40  QT:         361  QTc:        482    Interpretive Statements  Sinus tachycardia rate of 107 no ST elevations or ST depressions Q waves seen  in  the anterior and inferior leads unchanged from prior normal intervals normal  axis  Compared to ECG 10/25/2021 23:30:27  No significant change  Electronically Signed On 2021 0:41:59 PDT by Sara Remy MD         LABS  Pertinent Lab Findings    Labs Reviewed   COMP METABOLIC PANEL - Abnormal; Notable for the following components:       Result Value    Glucose 286 (*)     AST(SGOT) 57 (*)     ALT(SGPT) 52 (*)     Alkaline Phosphatase 143 (*)     All other components within normal limits   TROPONIN - Abnormal; Notable for the following components:    Troponin T 32 (*)     All other components within normal limits   PROBRAIN NATRIURETIC PEPTIDE, NT - Abnormal; Notable for the following components:    NT-proBNP 2887 (*)     All other components within normal limits   ESTIMATED GFR - Abnormal; Notable for the following components:    GFR If Non  57 (*)     All other components within normal limits   CBC WITH DIFFERENTIAL       RADIOLOGY  DX-CHEST-PORTABLE (1 VIEW)   Final Result      Stable cardiomegaly with possible mild vascular congestion. No new consolidation or pleural effusion.        The radiologist's interpretation of all radiological studies have been reviewed by me.      COURSE & MEDICAL DECISION MAKING  Pertinent Labs & Imaging  studies reviewed. (See chart for details      Patient has evidence of fluid overload he was treated with IV Lasix, unfortunately she is hypoxic and is not on oxygen at home.  She will be hospitalized for further evaluation especially in light of the fact that she has not been compliant with her liquid restriction and is continue to use amphetamines.    I spoke with [facility] family team who is accepted the patient for hospitalization    I verified that the patient was wearing a mask and I was wearing appropriate PPE every time I entered the room. The patient's mask was on the patient at all times during my encounter except for a brief view of the oropharynx.          FINAL IMPRESSION  1. Hypoxia     2. Acute on chronic systolic congestive heart failure (HCC)     3. Noncompliance             Electronically signed by: Sara Quintanilla M.D., 11/3/2021 11:32 PM    This dictation has been created using voice recognition software and/or scribes. The accuracy of the dictation is limited by the abilities of the software and the expertise of the scribes. I expect there may be some errors of grammar and possibly content. I made every attempt to manually correct the errors within my dictation. However, errors related to voice recognition software and/or scribes may still exist and should be interpreted within the appropriate context.

## 2021-11-04 NOTE — H&P
[facility]     PATIENT ID:  NAME:  Mer Arriaza  MRN:               [id number]  YOB: 1970    Date of Admission: 11/3/2021     Attending: Hong Live MD     Resident: Neil Powell MD     Primary Care Physician:  Manny Araya MD     CC:  SOB       HPI: Mer Arriaza is a 50 y.o. female with past medical history of methamphetamine abuse, CHF with systolic dysfunction, and frequent CHF exacerbations who presented with shortness of breath, bilateral lower extremity swelling, and calf pain.  Patient was very poor historian and tangential in her thought process.  Patient was recently discharged on 10/26 when she was admitted for a CHF exacerbation.  She states that since her discharge her shortness of breath is gotten worse, she has developed a productive cough with clear sputum, and her lower extremities have gotten more swollen.  She states that her last methamphetamine use was right after discharge on 10/26.  She told the ERP that she has not been taking her medications, but when I spoke with her she was adamant that she had been and was able to list most of her heart failure medications.  Patient denied any fevers, chills, chest pain, palpitations, abdominal pain, nausea, vomiting, diarrhea, urinary symptoms, or any other concerning symptoms.      ERCourse:  While in the ER she is remained tachycardic from 105-110.  She desatted down to the low 80s on room air.  She was placed on 4 L nasal cannula and her saturations improved to the mid 90s.  She has also been mildly hypertensive with the highest at 161/103.  Labs in the ER were significant for a glucose of 286, AST of 57, ALT of 52, troponin of 32, BNP of 2887    Chest x-ray showed mild vascular congestion with no consolidation or pleural effusion    EKG showed no acute changes, no T wave inversions or ST depression/elevation    REVIEW OF SYSTEMS:   Ten systems reviewed and were negative except as noted  in the HPI.                PAST MEDICAL HISTORY:  Past Medical History:   Diagnosis Date   • CAD (coronary artery disease)    • Congestive heart failure (HCC)    • Hypertension    • Psychiatric disorder     multiple personality disorder/bipolar       PAST SURGICAL HISTORY:  Past Surgical History:   Procedure Laterality Date   • ABDOMINAL HYSTERECTOMY TOTAL      Hysterectomy, Total Abdominal   • OTHER ORTHOPEDIC SURGERY      right       FAMILY HISTORY:  No family history on file.    SOCIAL HISTORY:   Social history was unable to be obtained secondary to patient tangential thinking    DIET:   Orders Placed This Encounter   Procedures   • Diet Order Diet: Cardiac; Second Modifier: (optional): 2 Gram Sodium; Fluid modifications: (optional): 1500 ml Fluid Restriction     Standing Status:   Standing     Number of Occurrences:   1     Order Specific Question:   Diet:     Answer:   Cardiac [6]     Order Specific Question:   Second Modifier: (optional)     Answer:   2 Gram Sodium [7]     Order Specific Question:   Fluid modifications: (optional)     Answer:   1500 ml Fluid Restriction [9]       ALLERGIES:  Allergies   Allergen Reactions   • Incruse Ellipta [Umeclidinium Bromide] Unspecified     Blisters in mouth and throat   • Naltrexone Swelling     All over body       OUTPATIENT MEDICATIONS:    Current Facility-Administered Medications:   •  senna-docusate (PERICOLACE or SENOKOT S) 8.6-50 MG per tablet 2 Tablet, 2 Tablet, Oral, BID **AND** polyethylene glycol/lytes (MIRALAX) PACKET 1 Packet, 1 Packet, Oral, QDAY PRN **AND** magnesium hydroxide (MILK OF MAGNESIA) suspension 30 mL, 30 mL, Oral, QDAY PRN **AND** bisacodyl (DULCOLAX) suppository 10 mg, 10 mg, Rectal, QDAY PRN, Neil Powell M.D.  •  Respiratory Therapy Consult, , Nebulization, Continuous RT, Neil Powell M.D.  •  enoxaparin (LOVENOX) inj 40 mg, 40 mg, Subcutaneous, DAILY, Neil Powell M.D.  •  acetaminophen (Tylenol) tablet 650 mg, 650 mg, Oral,  Q6HRS PRN, Neil Powell M.D.  •  albuterol inhaler 2 Puff, 2 Puff, Inhalation, Q6HRS PRN, Neil Powell M.D.  •  aspirin EC (ECOTRIN) tablet 81 mg, 81 mg, Oral, DAILY, Neil Powell M.D.  •  carvedilol (COREG) tablet 6.25 mg, 6.25 mg, Oral, BID, Neil Powell M.D.  •  furosemide (LASIX) tablet 20 mg, 20 mg, Oral, BID, Neil Powell M.D.  •  levothyroxine (SYNTHROID) tablet 50 mcg, 50 mcg, Oral, DAILY, Neil Powell M.D.  •  lisinopril (PRINIVIL) tablet 40 mg, 40 mg, Oral, DAILY, Neil Powell M.D.  •  potassium chloride SA (Kdur) tablet 10 mEq, 10 mEq, Oral, DAILY, Neil Powell M.D.  •  simvastatin (ZOCOR) tablet 40 mg, 40 mg, Oral, DAILY, Neil Powell M.D.  •  spironolactone (ALDACTONE) tablet 25 mg, 25 mg, Oral, DAILY, Neil Powell M.D.    Current Outpatient Medications:   •  naproxen (NAPROSYN) 250 MG Tab, Take 500 mg by mouth one time as needed (Mild pain)., Disp: , Rfl:   •  furosemide (LASIX) 20 MG Tab, Take 1 Tablet by mouth 2 times a day., Disp: 60 Tablet, Rfl: 0  •  spironolactone (ALDACTONE) 25 MG Tab, Take 1 Tablet by mouth every day., Disp: 30 Tablet, Rfl: 3  •  potassium chloride SA (K-DUR) 10 MEQ Tab CR, Take 1 Tablet by mouth every day., Disp: 30 Tablet, Rfl: 0  •  albuterol 108 (90 Base) MCG/ACT Aero Soln inhalation aerosol, Inhale 2 Puffs every 6 hours as needed for Shortness of Breath., Disp: , Rfl:   •  aspirin (ASPIRIN 81) 81 MG EC tablet, Take 1 Tablet by mouth every day., Disp: , Rfl:   •  carvedilol (COREG) 6.25 MG Tab, Take 1 Tablet by mouth 2 times a day., Disp: , Rfl:   •  levothyroxine (SYNTHROID) 50 MCG Tab, Take 50 mcg by mouth every day., Disp: , Rfl:   •  lisinopril (PRINIVIL) 40 MG tablet, Take 1 Tablet by mouth every day., Disp: , Rfl:   •  simvastatin (ZOCOR) 40 MG Tab, Take 1 Tablet by mouth every day., Disp: , Rfl:   •  metFORMIN (GLUCOPHAGE) 500 MG Tab, Take 1 Tablet by mouth 2 times a day., Disp: , Rfl:     PHYSICAL EXAM:  Vitals:     21 0100 21 0130 21 0200 21 0300   BP: 143/103 146/103 152/97 (!) 161/103   Pulse: (!) 105 (!) 107     Resp: 16 (!)  20   Temp:       TempSrc:       SpO2: 95% 89%     Weight:       Height:       , Temp (24hrs), Av.8 °C (98.2 °F), Min:36.8 °C (98.2 °F), Max:36.8 °C (98.2 °F)  , Pulse Oximetry: 89 %, O2 (LPM): 4, O2 Delivery Device: Nasal Cannula    General: Pt resting in NAD, cooperative   Skin:  Pink, warm and dry.  No rashes  HEENT: NC/AT. PERRL. EOMI. MMM. No nasal discharge. Oropharynx nonerythematous without exudate/plaques  Neck:  Supple without lymphadenopathy or rigidity.   Lungs:  Symmetrical.  CTAB with no W/R/R.  Good air movement, bibasilar creps  Cardiovascular:  S1/S2 RRR without murmurs  Abdomen:  Abdomen is soft, nontender, nondistended. +BS. No masses noted.  Extremities:  Full range of motion. No gross deformities noted. 2+ pulses in all extremities. No edema, no clinical signs of DVT    Spine:  Straight without vertebral anomalies.  CNS:  Muscle tone is normal. No gross focal neurologic deficits      LAB TESTS:   Recent Labs     21  0000   WBC 8.6   RBC 4.79   HEMOGLOBIN 13.3   HEMATOCRIT 41.4   MCV 86.4   MCH 27.8   RDW 45.2   PLATELETCT 340   MPV 9.5   NEUTSPOLYS 74.80*   LYMPHOCYTES 22.70   MONOCYTES 1.70   EOSINOPHILS 0.00   BASOPHILS 0.80   RBCMORPHOLO Present         Recent Labs     21  0000   SODIUM 137   POTASSIUM 3.9   CHLORIDE 100   CO2 25   BUN 21   CREATININE 1.03   CALCIUM 8.7   ALBUMIN 3.6       CULTURES:   Results     ** No results found for the last 168 hours. **          IMAGES:  DX-CHEST-PORTABLE (1 VIEW)   Final Result      Stable cardiomegaly with possible mild vascular congestion. No new consolidation or pleural effusion.          CONSULTS:   None    ASSESSMENT/PLAN:   50 y.o. female admitted for acute CHF exacerbation    * Acute on chronic clinical systolic heart failure (HCC)  Assessment & Plan  Patient has methamphetamine induced  systolic heart failure with recent echo completed 10/27/2021 showing ejection fraction of 30%.  Patient was recently hospitalized and discharged on 10/26 for acute heart failure exacerbation.  Unclear on whether patient was taking medications.  Patient did admit to taking methamphetamine on 10/26.  Patient fluid overloaded on exam with a BNP of 2887.  Also minor elevation of troponin which is likely secondary to demand ischemia.  Patient denies chest pain at this time.  Plan:  -Initiate diuresis with IV 40 mg Lasix twice daily  -Accurate I's and O's  -Daily weights  -Fluid restriction of 1.5 L  -Salt restriction of 2 g  -Cardiac diet  -Trend troponins  -Consider repeat echo, last was 5 days prior  -Continue home heart failure meds including: carvedilol, furosemide, lisinopril, simvastatin, spironolactone    Type 2 diabetes mellitus without complications (HCC)- (present on admission)  Assessment & Plan  Patient appears to be on Metformin at home.  Not insulin-dependent diabetes.  Plan:  -Hold home Metformin  -Start low-dose insulin sliding scale  -Follow-up on A1c    Thyroid disorder- (present on admission)  Assessment & Plan  Continue home levothyroxine    Hypertension- (present on admission)  Assessment & Plan  Continue home lisinopril    Hyperlipidemia- (present on admission)  Assessment & Plan  Continue home statin    Chronic obstructive pulmonary disease (HCC)- (present on admission)  Assessment & Plan  Continue home albuterol    Acute respiratory failure with hypoxia (HCC)- (present on admission)  Assessment & Plan  Patient presented with acute hypoxic respiratory failure secondary to CHF exacerbation.  Plan:  -Titrate supplementary oxygen as needed to keep saturations above 90%  -If patient remains persistently tachycardic, consider D-dimer and further work-up for PE    Abnormal liver function tests- (present on admission)  Assessment & Plan  No right upper quadrant pain reported by patient.  Abnormalities  were reported on last admission.  No work-up could be found on limited chart review.  Likely secondary to TERAN/NAFLD, but consider right upper quadrant ultrasound and hepatitis work-up.        Lines: PIV  Abx: None  DVT prophylaxis: Lovenox  CODE Status: Full    Neil Powell MD   PGY-2 Family Medicine Resident   [facility]

## 2021-11-04 NOTE — PROGRESS NOTES
Emr Debbie Arsalan patient has chosen to leave the hospital against medical advice. The attending physician has not discharged the patient. Patient is not a risk to himself or others. I have discussed with the patient the following:  Physician has not determined patient is ready for discharge.      Discharge against medical advice form has been Patient left abruptly - no chance to sign.      Dr Maldonado notified via phone

## 2021-11-04 NOTE — ASSESSMENT & PLAN NOTE
Patient appears to be on Metformin at home.  Not insulin-dependent diabetes.  Plan:  -Hold home Metformin  -Start low-dose insulin sliding scale  -Follow-up on A1c

## 2021-11-05 PROCEDURE — 302449 STATCHG TRIAGE ONLY (STATISTIC)
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
## 2021-11-05 NOTE — ED NOTES
PT asking for ice chips. PT educated that there should be no food or drink until seen by an ERP. Ice chips not provided.

## 2021-11-06 ENCOUNTER — APPOINTMENT (OUTPATIENT)
Dept: RADIOLOGY | Facility: MEDICAL CENTER | Age: 51
DRG: 291 | End: 2021-11-06
Attending: EMERGENCY MEDICINE
Payer: MEDICAID

## 2021-11-06 ENCOUNTER — APPOINTMENT (OUTPATIENT)
Dept: RADIOLOGY | Facility: MEDICAL CENTER | Age: 51
DRG: 291 | End: 2021-11-06
Attending: STUDENT IN AN ORGANIZED HEALTH CARE EDUCATION/TRAINING PROGRAM
Payer: MEDICAID

## 2021-11-06 ENCOUNTER — HOSPITAL ENCOUNTER (INPATIENT)
Facility: MEDICAL CENTER | Age: 51
LOS: 3 days | DRG: 291 | End: 2021-11-09
Attending: EMERGENCY MEDICINE | Admitting: HOSPITALIST
Payer: MEDICAID

## 2021-11-06 DIAGNOSIS — F15.10 METHAMPHETAMINE ABUSE (HCC): ICD-10-CM

## 2021-11-06 DIAGNOSIS — I50.9 ACUTE ON CHRONIC CONGESTIVE HEART FAILURE, UNSPECIFIED HEART FAILURE TYPE (HCC): ICD-10-CM

## 2021-11-06 DIAGNOSIS — R09.02 HYPOXIA: ICD-10-CM

## 2021-11-06 PROBLEM — I16.0 HYPERTENSIVE URGENCY: Status: ACTIVE | Noted: 2021-11-06

## 2021-11-06 LAB
ALBUMIN SERPL BCP-MCNC: 3.6 G/DL (ref 3.2–4.9)
ALBUMIN/GLOB SERPL: 0.9 G/DL
ALP SERPL-CCNC: 158 U/L (ref 30–99)
ALT SERPL-CCNC: 83 U/L (ref 2–50)
AMPHET UR QL SCN: POSITIVE
ANION GAP SERPL CALC-SCNC: 11 MMOL/L (ref 7–16)
AST SERPL-CCNC: 77 U/L (ref 12–45)
BARBITURATES UR QL SCN: NEGATIVE
BASOPHILS # BLD AUTO: 0.4 % (ref 0–1.8)
BASOPHILS # BLD: 0.03 K/UL (ref 0–0.12)
BENZODIAZ UR QL SCN: NEGATIVE
BILIRUB SERPL-MCNC: 0.6 MG/DL (ref 0.1–1.5)
BUN SERPL-MCNC: 17 MG/DL (ref 8–22)
BZE UR QL SCN: NEGATIVE
CALCIUM SERPL-MCNC: 8.7 MG/DL (ref 8.5–10.5)
CANNABINOIDS UR QL SCN: NEGATIVE
CHLORIDE SERPL-SCNC: 103 MMOL/L (ref 96–112)
CO2 SERPL-SCNC: 26 MMOL/L (ref 20–33)
CREAT SERPL-MCNC: 0.94 MG/DL (ref 0.5–1.4)
EKG IMPRESSION: NORMAL
EOSINOPHIL # BLD AUTO: 0.07 K/UL (ref 0–0.51)
EOSINOPHIL NFR BLD: 1 % (ref 0–6.9)
ERYTHROCYTE [DISTWIDTH] IN BLOOD BY AUTOMATED COUNT: 45.5 FL (ref 35.9–50)
ETHANOL BLD-MCNC: <10.1 MG/DL (ref 0–10)
FLUAV RNA SPEC QL NAA+PROBE: NEGATIVE
FLUBV RNA SPEC QL NAA+PROBE: NEGATIVE
GLOBULIN SER CALC-MCNC: 4 G/DL (ref 1.9–3.5)
GLUCOSE BLD-MCNC: 156 MG/DL (ref 65–99)
GLUCOSE BLD-MCNC: 265 MG/DL (ref 65–99)
GLUCOSE SERPL-MCNC: 331 MG/DL (ref 65–99)
HCT VFR BLD AUTO: 44.2 % (ref 37–47)
HGB BLD-MCNC: 14.1 G/DL (ref 12–16)
IMM GRANULOCYTES # BLD AUTO: 0.02 K/UL (ref 0–0.11)
IMM GRANULOCYTES NFR BLD AUTO: 0.3 % (ref 0–0.9)
LYMPHOCYTES # BLD AUTO: 1.64 K/UL (ref 1–4.8)
LYMPHOCYTES NFR BLD: 23.7 % (ref 22–41)
MAGNESIUM SERPL-MCNC: 1.9 MG/DL (ref 1.5–2.5)
MCH RBC QN AUTO: 27.4 PG (ref 27–33)
MCHC RBC AUTO-ENTMCNC: 31.9 G/DL (ref 33.6–35)
MCV RBC AUTO: 86 FL (ref 81.4–97.8)
METHADONE UR QL SCN: NEGATIVE
MONOCYTES # BLD AUTO: 0.55 K/UL (ref 0–0.85)
MONOCYTES NFR BLD AUTO: 7.9 % (ref 0–13.4)
NEUTROPHILS # BLD AUTO: 4.62 K/UL (ref 2–7.15)
NEUTROPHILS NFR BLD: 66.7 % (ref 44–72)
NRBC # BLD AUTO: 0 K/UL
NRBC BLD-RTO: 0 /100 WBC
NT-PROBNP SERPL IA-MCNC: 2049 PG/ML (ref 0–125)
OPIATES UR QL SCN: POSITIVE
OXYCODONE UR QL SCN: NEGATIVE
PCP UR QL SCN: NEGATIVE
PHOSPHATE SERPL-MCNC: 3.5 MG/DL (ref 2.5–4.5)
PLATELET # BLD AUTO: 345 K/UL (ref 164–446)
PMV BLD AUTO: 9.4 FL (ref 9–12.9)
POTASSIUM SERPL-SCNC: 4 MMOL/L (ref 3.6–5.5)
PROPOXYPH UR QL SCN: NEGATIVE
PROT SERPL-MCNC: 7.6 G/DL (ref 6–8.2)
RBC # BLD AUTO: 5.14 M/UL (ref 4.2–5.4)
RSV RNA SPEC QL NAA+PROBE: NEGATIVE
SARS-COV-2 RNA RESP QL NAA+PROBE: NOTDETECTED
SODIUM SERPL-SCNC: 140 MMOL/L (ref 135–145)
SPECIMEN SOURCE: NORMAL
TROPONIN T SERPL-MCNC: 31 NG/L (ref 6–19)
WBC # BLD AUTO: 6.9 K/UL (ref 4.8–10.8)

## 2021-11-06 PROCEDURE — 700111 HCHG RX REV CODE 636 W/ 250 OVERRIDE (IP): Performed by: EMERGENCY MEDICINE

## 2021-11-06 PROCEDURE — 96376 TX/PRO/DX INJ SAME DRUG ADON: CPT

## 2021-11-06 PROCEDURE — 71045 X-RAY EXAM CHEST 1 VIEW: CPT

## 2021-11-06 PROCEDURE — 80307 DRUG TEST PRSMV CHEM ANLYZR: CPT

## 2021-11-06 PROCEDURE — A9270 NON-COVERED ITEM OR SERVICE: HCPCS | Performed by: STUDENT IN AN ORGANIZED HEALTH CARE EDUCATION/TRAINING PROGRAM

## 2021-11-06 PROCEDURE — 82962 GLUCOSE BLOOD TEST: CPT | Mod: 91

## 2021-11-06 PROCEDURE — 700111 HCHG RX REV CODE 636 W/ 250 OVERRIDE (IP): Performed by: STUDENT IN AN ORGANIZED HEALTH CARE EDUCATION/TRAINING PROGRAM

## 2021-11-06 PROCEDURE — C9803 HOPD COVID-19 SPEC COLLECT: HCPCS | Performed by: EMERGENCY MEDICINE

## 2021-11-06 PROCEDURE — 93005 ELECTROCARDIOGRAM TRACING: CPT

## 2021-11-06 PROCEDURE — 71250 CT THORAX DX C-: CPT

## 2021-11-06 PROCEDURE — 84100 ASSAY OF PHOSPHORUS: CPT

## 2021-11-06 PROCEDURE — 82077 ASSAY SPEC XCP UR&BREATH IA: CPT

## 2021-11-06 PROCEDURE — 0241U HCHG SARS-COV-2 COVID-19 NFCT DS RESP RNA 4 TRGT MIC: CPT

## 2021-11-06 PROCEDURE — 700102 HCHG RX REV CODE 250 W/ 637 OVERRIDE(OP): Performed by: STUDENT IN AN ORGANIZED HEALTH CARE EDUCATION/TRAINING PROGRAM

## 2021-11-06 PROCEDURE — 80053 COMPREHEN METABOLIC PANEL: CPT

## 2021-11-06 PROCEDURE — 94760 N-INVAS EAR/PLS OXIMETRY 1: CPT

## 2021-11-06 PROCEDURE — 99223 1ST HOSP IP/OBS HIGH 75: CPT | Mod: GC | Performed by: HOSPITALIST

## 2021-11-06 PROCEDURE — 84484 ASSAY OF TROPONIN QUANT: CPT

## 2021-11-06 PROCEDURE — 700102 HCHG RX REV CODE 250 W/ 637 OVERRIDE(OP): Performed by: HOSPITALIST

## 2021-11-06 PROCEDURE — 99285 EMERGENCY DEPT VISIT HI MDM: CPT

## 2021-11-06 PROCEDURE — 83880 ASSAY OF NATRIURETIC PEPTIDE: CPT

## 2021-11-06 PROCEDURE — 96374 THER/PROPH/DIAG INJ IV PUSH: CPT

## 2021-11-06 PROCEDURE — 93005 ELECTROCARDIOGRAM TRACING: CPT | Performed by: EMERGENCY MEDICINE

## 2021-11-06 PROCEDURE — 700111 HCHG RX REV CODE 636 W/ 250 OVERRIDE (IP)

## 2021-11-06 PROCEDURE — 36415 COLL VENOUS BLD VENIPUNCTURE: CPT

## 2021-11-06 PROCEDURE — 83735 ASSAY OF MAGNESIUM: CPT

## 2021-11-06 PROCEDURE — A9270 NON-COVERED ITEM OR SERVICE: HCPCS | Performed by: HOSPITALIST

## 2021-11-06 PROCEDURE — 96375 TX/PRO/DX INJ NEW DRUG ADDON: CPT

## 2021-11-06 PROCEDURE — 85025 COMPLETE CBC W/AUTO DIFF WBC: CPT

## 2021-11-06 PROCEDURE — 770020 HCHG ROOM/CARE - TELE (206)

## 2021-11-06 RX ORDER — POLYETHYLENE GLYCOL 3350 17 G/17G
1 POWDER, FOR SOLUTION ORAL
Status: DISCONTINUED | OUTPATIENT
Start: 2021-11-06 | End: 2021-11-09 | Stop reason: HOSPADM

## 2021-11-06 RX ORDER — FUROSEMIDE 10 MG/ML
40 INJECTION INTRAMUSCULAR; INTRAVENOUS
Status: DISCONTINUED | OUTPATIENT
Start: 2021-11-07 | End: 2021-11-07

## 2021-11-06 RX ORDER — CARVEDILOL 6.25 MG/1
6.25 TABLET ORAL 2 TIMES DAILY WITH MEALS
Status: DISCONTINUED | OUTPATIENT
Start: 2021-11-06 | End: 2021-11-07

## 2021-11-06 RX ORDER — PROMETHAZINE HYDROCHLORIDE 25 MG/1
12.5-25 TABLET ORAL EVERY 4 HOURS PRN
Status: DISCONTINUED | OUTPATIENT
Start: 2021-11-06 | End: 2021-11-09 | Stop reason: HOSPADM

## 2021-11-06 RX ORDER — LISINOPRIL 20 MG/1
40 TABLET ORAL
Status: DISCONTINUED | OUTPATIENT
Start: 2021-11-06 | End: 2021-11-06

## 2021-11-06 RX ORDER — DEXTROSE MONOHYDRATE 25 G/50ML
50 INJECTION, SOLUTION INTRAVENOUS
Status: DISCONTINUED | OUTPATIENT
Start: 2021-11-06 | End: 2021-11-08

## 2021-11-06 RX ORDER — ONDANSETRON 2 MG/ML
4 INJECTION INTRAMUSCULAR; INTRAVENOUS ONCE
Status: COMPLETED | OUTPATIENT
Start: 2021-11-06 | End: 2021-11-06

## 2021-11-06 RX ORDER — PROCHLORPERAZINE EDISYLATE 5 MG/ML
5-10 INJECTION INTRAMUSCULAR; INTRAVENOUS EVERY 4 HOURS PRN
Status: DISCONTINUED | OUTPATIENT
Start: 2021-11-06 | End: 2021-11-09 | Stop reason: HOSPADM

## 2021-11-06 RX ORDER — INSULIN LISPRO 100 [IU]/ML
5 INJECTION, SOLUTION INTRAVENOUS; SUBCUTANEOUS
Status: DISCONTINUED | OUTPATIENT
Start: 2021-11-06 | End: 2021-11-08

## 2021-11-06 RX ORDER — NICOTINE 21 MG/24HR
21 PATCH, TRANSDERMAL 24 HOURS TRANSDERMAL
Status: DISCONTINUED | OUTPATIENT
Start: 2021-11-06 | End: 2021-11-09 | Stop reason: HOSPADM

## 2021-11-06 RX ORDER — FUROSEMIDE 10 MG/ML
40 INJECTION INTRAMUSCULAR; INTRAVENOUS
Status: DISCONTINUED | OUTPATIENT
Start: 2021-11-06 | End: 2021-11-06

## 2021-11-06 RX ORDER — AMOXICILLIN 250 MG
2 CAPSULE ORAL 2 TIMES DAILY
Status: DISCONTINUED | OUTPATIENT
Start: 2021-11-07 | End: 2021-11-09 | Stop reason: HOSPADM

## 2021-11-06 RX ORDER — SIMVASTATIN 40 MG
40 TABLET ORAL EVERY EVENING
Status: DISCONTINUED | OUTPATIENT
Start: 2021-11-06 | End: 2021-11-09 | Stop reason: HOSPADM

## 2021-11-06 RX ORDER — HYDRALAZINE HYDROCHLORIDE 20 MG/ML
10 INJECTION INTRAMUSCULAR; INTRAVENOUS EVERY 4 HOURS PRN
Status: DISCONTINUED | OUTPATIENT
Start: 2021-11-06 | End: 2021-11-09 | Stop reason: HOSPADM

## 2021-11-06 RX ORDER — INSULIN LISPRO 100 [IU]/ML
1-6 INJECTION, SOLUTION INTRAVENOUS; SUBCUTANEOUS
Status: DISCONTINUED | OUTPATIENT
Start: 2021-11-06 | End: 2021-11-08

## 2021-11-06 RX ORDER — MORPHINE SULFATE 4 MG/ML
4 INJECTION, SOLUTION INTRAMUSCULAR; INTRAVENOUS ONCE
Status: COMPLETED | OUTPATIENT
Start: 2021-11-06 | End: 2021-11-06

## 2021-11-06 RX ORDER — BISACODYL 10 MG
10 SUPPOSITORY, RECTAL RECTAL
Status: DISCONTINUED | OUTPATIENT
Start: 2021-11-06 | End: 2021-11-09 | Stop reason: HOSPADM

## 2021-11-06 RX ORDER — LEVOTHYROXINE SODIUM 0.05 MG/1
50 TABLET ORAL DAILY
Status: DISCONTINUED | OUTPATIENT
Start: 2021-11-06 | End: 2021-11-09 | Stop reason: HOSPADM

## 2021-11-06 RX ORDER — ONDANSETRON 4 MG/1
4 TABLET, ORALLY DISINTEGRATING ORAL EVERY 4 HOURS PRN
Status: DISCONTINUED | OUTPATIENT
Start: 2021-11-06 | End: 2021-11-09 | Stop reason: HOSPADM

## 2021-11-06 RX ORDER — ACETAMINOPHEN 325 MG/1
650 TABLET ORAL EVERY 6 HOURS PRN
Status: DISCONTINUED | OUTPATIENT
Start: 2021-11-06 | End: 2021-11-09 | Stop reason: HOSPADM

## 2021-11-06 RX ORDER — IBUPROFEN 200 MG
800 TABLET ORAL EVERY 6 HOURS PRN
COMMUNITY
End: 2021-11-12

## 2021-11-06 RX ORDER — LISINOPRIL 20 MG/1
20 TABLET ORAL
Status: DISCONTINUED | OUTPATIENT
Start: 2021-11-07 | End: 2021-11-08

## 2021-11-06 RX ORDER — FUROSEMIDE 10 MG/ML
40 INJECTION INTRAMUSCULAR; INTRAVENOUS ONCE
Status: COMPLETED | OUTPATIENT
Start: 2021-11-06 | End: 2021-11-06

## 2021-11-06 RX ORDER — SPIRONOLACTONE 25 MG/1
25 TABLET ORAL DAILY
Status: DISCONTINUED | OUTPATIENT
Start: 2021-11-06 | End: 2021-11-09

## 2021-11-06 RX ORDER — ONDANSETRON 2 MG/ML
4 INJECTION INTRAMUSCULAR; INTRAVENOUS EVERY 4 HOURS PRN
Status: DISCONTINUED | OUTPATIENT
Start: 2021-11-06 | End: 2021-11-09 | Stop reason: HOSPADM

## 2021-11-06 RX ORDER — PROMETHAZINE HYDROCHLORIDE 25 MG/1
12.5-25 SUPPOSITORY RECTAL EVERY 4 HOURS PRN
Status: DISCONTINUED | OUTPATIENT
Start: 2021-11-06 | End: 2021-11-09 | Stop reason: HOSPADM

## 2021-11-06 RX ADMIN — INSULIN LISPRO 1 UNITS: 100 INJECTION, SOLUTION INTRAVENOUS; SUBCUTANEOUS at 21:14

## 2021-11-06 RX ADMIN — FUROSEMIDE 40 MG: 10 INJECTION, SOLUTION INTRAMUSCULAR; INTRAVENOUS at 14:26

## 2021-11-06 RX ADMIN — ONDANSETRON 4 MG: 2 INJECTION INTRAMUSCULAR; INTRAVENOUS at 18:07

## 2021-11-06 RX ADMIN — INSULIN LISPRO 5 UNITS: 100 INJECTION, SOLUTION INTRAVENOUS; SUBCUTANEOUS at 18:04

## 2021-11-06 RX ADMIN — SPIRONOLACTONE 25 MG: 25 TABLET ORAL at 14:27

## 2021-11-06 RX ADMIN — CARVEDILOL 6.25 MG: 6.25 TABLET, FILM COATED ORAL at 14:27

## 2021-11-06 RX ADMIN — LISINOPRIL 40 MG: 20 TABLET ORAL at 14:28

## 2021-11-06 RX ADMIN — INSULIN GLARGINE 14 UNITS: 100 INJECTION, SOLUTION SUBCUTANEOUS at 18:02

## 2021-11-06 RX ADMIN — MORPHINE SULFATE 4 MG: 4 INJECTION INTRAVENOUS at 12:30

## 2021-11-06 RX ADMIN — SIMVASTATIN 40 MG: 40 TABLET, FILM COATED ORAL at 18:07

## 2021-11-06 RX ADMIN — LEVOTHYROXINE SODIUM 50 MCG: 0.05 TABLET ORAL at 14:27

## 2021-11-06 RX ADMIN — ONDANSETRON 4 MG: 2 INJECTION INTRAMUSCULAR; INTRAVENOUS at 12:45

## 2021-11-06 RX ADMIN — INSULIN LISPRO 3 UNITS: 100 INJECTION, SOLUTION INTRAVENOUS; SUBCUTANEOUS at 18:03

## 2021-11-06 RX ADMIN — FUROSEMIDE 40 MG: 10 INJECTION, SOLUTION INTRAMUSCULAR; INTRAVENOUS at 12:35

## 2021-11-06 ASSESSMENT — FIBROSIS 4 INDEX: FIB4 SCORE: 1.38

## 2021-11-06 ASSESSMENT — ENCOUNTER SYMPTOMS
NAUSEA: 0
FEVER: 0
DIZZINESS: 0
HEADACHES: 0
SORE THROAT: 0
ABDOMINAL PAIN: 0
DOUBLE VISION: 0
CHILLS: 0
CONSTIPATION: 0
NECK PAIN: 0
TINGLING: 1
WEAKNESS: 0
BLURRED VISION: 0
VOMITING: 0
SHORTNESS OF BREATH: 1
COUGH: 0
PALPITATIONS: 0
BLOOD IN STOOL: 0
DIARRHEA: 0
BACK PAIN: 0
ORTHOPNEA: 1

## 2021-11-06 ASSESSMENT — LIFESTYLE VARIABLES
DO YOU DRINK ALCOHOL: NO
SUBSTANCE_ABUSE: 1

## 2021-11-06 ASSESSMENT — PAIN DESCRIPTION - PAIN TYPE: TYPE: ACUTE PAIN

## 2021-11-06 NOTE — ASSESSMENT & PLAN NOTE
Likely 2/2 noncompliance in the setting of meth induced cardiomyopathy. Patient recently left hospital AMA, was already in CHF exacerbation. Presents for worsening of symptoms. Patient states that they sometimes consume more fluids than their restriction, knowing it will worsen their fluid overload. BNP 2049. Trop 31; likely due to demand ischemia. TTE 10/27 showing 30% EF with moderate MR and TR, and RVSP 55 mmHg. EKG showing sinus tachy w/o ST elevation/depression.    -Discontinued lasix and spironolactone; patient's Cr increased from 1.01 to 1.35 this morning, likely dry  -Decreased lisinopril from 20mg to 5mg due to Cr bump  -Increased hydralazine from 25mg tid to 50mg tid  -Continue coreg 6.25mg bid  -Strict I&Os and daily weights  -Cardiac diet; 1L fluid restriction and 2g salt restriction

## 2021-11-06 NOTE — ED NOTES
Med Rec completed: per patient at bedside.     Patient reports no ORAL antibiotics at home in last 30 days    Preferred Pharmacy: [facility] [id number] [facility] at [phone]      Pt confirmed following allergies:  Allergies   Allergen Reactions   • Incruse Ellipta [Umeclidinium Bromide] Unspecified     Blisters in mouth and throat   • Naltrexone Swelling     All over body        Pt's home medications:   Medication Sig Notes   • Umeclidinium Bromide (INCRUSE ELLIPTA) 62.5 MCG/INH AEROSOL POWDER, BREATH ACTIVATED   Inhale 2 Puffs every day.    • furosemide (LASIX) 20 MG Tab Take 1 Tablet by mouth 2 times a day.      • spironolactone (ALDACTONE) 25 MG Tab Take 1 Tablet by mouth every day.      • potassium chloride SA (K-DUR) 10 MEQ Tab CR Take 1 Tablet by mouth every day.   Patient states that also has a 20 MEQ tablet at home and was instructed to start taking after finished with 10 MEQ tablet      • albuterol 108 (90 Base) MCG/ACT Aero Soln inhalation aerosol Inhale 2 Puffs every 6 hours as needed for Shortness of Breath.   Patient states has been four for 4 days and has not used    • aspirin (ASPIRIN 81) 81 MG EC tablet Take 1 Tablet by mouth every day.      • carvedilol (COREG) 6.25 MG Tab Take 6.25 mg by mouth every day.   Previously on twice daily. Patient reports only takes once daily.     Confirmed 2x with patient.    • levothyroxine (SYNTHROID) 50 MCG Tab Take 50 mcg by mouth every day.      • lisinopril (PRINIVIL) 40 MG tablet Take 1 Tablet by mouth every day.      • simvastatin (ZOCOR) 40 MG Tab Take 1 Tablet by mouth every day.      • metFORMIN (GLUCOPHAGE) 500 MG Tab Take 1 Tablet by mouth 2 times a day.        Removed medications:   Medication Removal Reason   • naproxen (NAPROSYN) 250 MG Tab Patient states no longer taking

## 2021-11-06 NOTE — ASSESSMENT & PLAN NOTE
Patient drinks regularly, 6 drinks every day or every other day. Drank 3 shots this morning before presenting to ED.    -Watch for alcohol withdrawal, may require CIWA  -Discuss alcohol cessation  -Alcohol neg

## 2021-11-06 NOTE — ASSESSMENT & PLAN NOTE
Presented with BP 180s-190s/120s-130s.    -Continue on coreg 6.25 bid  -Continue on hydralazine 10mg IV PRN for SBP>180  -Decrease lisinopril from 20mg daily to 5mg daily due to Cr increase  -Increased hydralazine from 25mg tid to 50mg tid

## 2021-11-06 NOTE — ASSESSMENT & PLAN NOTE
Likely 2/2 volume overload from CHF exacerbation as well as body habitus. CXR shows interstitial edema vs pneumonitis. COVID neg. Does not use oxygen at home. Positive orthopnea. CT thorax shows cardiomegaly, subsegmental atelectasis in lingula, and cholelithiasis; radiographic emphysema.    -Currently on 3.5L; wean as tolerated  -RT protocol  -Discontinued lasix and spironolactone due to Cr increase

## 2021-11-06 NOTE — SENIOR ADMIT NOTE
50 years old female with also notable for methamphetamine abuse admits that she used 5 days ago, heart failure with reduced ejection fraction 30% last echo couple weeks ago likely due to methamphetamine induced cardiomyopathy, pulmonary hypertension type II, type 2 diabetes mellitus under controlled A1c 11, hypertension presented to emergency department with shortness of breath, orthopnea and paroxysmal nocturnal dyspnea with lower extremity swelling about 3 days.  Patient did not take her medications today in the morning, recently was discharged home on spironolactone, Lasix, Coreg 6.25 twice daily.  Patient received her Lasix about 4 days ago.  She states she did not use any methamphetamine for the last 5 days, she usually snores and did not use any IV meth recently.  In the emergency department patient was significantly hypertensive at 190/120, chest x-ray showed interstitial edema versus COVID-19, normal white count and hemoglobin, BNP significant elevated at 2000, troponin mildly elevated 31 and patient denies any chest pain, normal electrolytes with significant elevated glucose at 330 without any anion gap, transaminitis hepatocellular pattern without T bili elevation.  Patient will be admitted to the hospital for acute hypoxic respiratory failure due to volume overload secondary to heart failure with reduced ejection fraction. Patient is covid negative.    Assessment and plan  #Heart failure with reduced ejection fraction, exacerbation  #Methamphetamine induced cardiomyopathy  #Hypertensive urgency  #Pulmonary hypertension type II  #Acute hypoxic respiratory failure due to volume overload  #Methamphetamine abuse  #Type 2 diabetes mellitus, uncontrolled  #Sinus tachycardia due to volume overload    -Admit patient to telemetry unit due to multiple cardiac problems and hypoxia  -Send urine drug screen, blood alcohol level  -Initiate patient on 40 IV twice daily Lasix, intake and output, daily weights, fluid  restriction, add spironolactone 25, continue Coreg 6.25 since patient did not miss besides today, lisinopril 20 daily.  Monitor for electrolytes, alkalosis.  Post diuretic assessment  -Patient denies any chest pain, EKG showed no signs of ischemia, therefore patient does not require catheterization  -Hydralazine IV for hypertensive urgency  -CT thorax without contrast  -Do not need to repeat echo.    Lovenox  Full code

## 2021-11-06 NOTE — ASSESSMENT & PLAN NOTE
Uncontrolled DM2. A1c 10.8%.    --235 over past 24hrs  -Continue on glargine from 14u qHS to 17u qHS  -Continue on lispro 5u tid premeal  -Continue on low SSI; 7u over past 24hrs

## 2021-11-06 NOTE — ED PROVIDER NOTES
ED Provider Note    CHIEF COMPLAINT  Chief Complaint   Patient presents with   • Leg Swelling     bilateral leg swelling increasing over last week. pt left [facility] from hospital 2 days ago for same s/s        HPI  Mer Arriaza is a 50 y.o. female who presents with a history of CHF, methamphetamine abuse, noncompliance, she recently was admitted for CHF exacerbation, at that time she was hypoxic and requiring oxygen.  The patient left AGAINST MEDICAL ADVICE and since going home has not been able to establish home oxygen.  She reports that this morning she did not take her Lasix.  She is complaining of severe shortness of breath and leg swelling and pain.  She denies fever or cough.  She has not vaccinated for Covid.    REVIEW OF SYSTEMS  See HPI for further details. All other systems are negative.     PAST MEDICAL HISTORY   has a past medical history of CAD (coronary artery disease), Congestive heart failure (HCC), Hypertension, and Psychiatric disorder.    SOCIAL HISTORY  Social History     Tobacco Use   • Smoking status: Current Every Day Smoker     Packs/day: 1.00     Types: Cigarettes     Start date: 1983   • Smokeless tobacco: Never Used   Substance and Sexual Activity   • Alcohol use: No   • Drug use: Yes     Types: Inhaled     Comment: occassional meth use, last 2 days ago   • Sexual activity: Not on file       SURGICAL HISTORY   has a past surgical history that includes other orthopedic surgery and abdominal hysterectomy total.    CURRENT MEDICATIONS  Home Medications     Reviewed by Patricia Burnette, Pharmacy Intern (Pharmacy Intern) on 11/06/21 at 2947  Med List Status: Complete   Medication Last Dose Status   albuterol 108 (90 Base) MCG/ACT Aero Soln inhalation aerosol 11/3/2021 Active   aspirin (ASPIRIN 81) 81 MG EC tablet 11/5/2021 Active   carvedilol (COREG) 6.25 MG Tab 11/5/2021 Active   furosemide (LASIX) 20 MG Tab 11/6/2021 Active   ibuprofen (MOTRIN) 200 MG Tab 11/6/2021 Active   levothyroxine  (SYNTHROID) 50 MCG Tab 11/5/2021 Active   lisinopril (PRINIVIL) 40 MG tablet 11/5/2021 Active   metFORMIN (GLUCOPHAGE) 500 MG Tab 11/6/2021 Active   potassium chloride SA (K-DUR) 10 MEQ Tab CR 11/5/2021 Active   simvastatin (ZOCOR) 40 MG Tab 11/5/2021 Active   spironolactone (ALDACTONE) 25 MG Tab 11/5/2021 Active   Umeclidinium Bromide (INCRUSE ELLIPTA) 62.5 MCG/INH AEROSOL POWDER, BREATH ACTIVATED 11/5/2021 Active                ALLERGIES  Allergies   Allergen Reactions   • Incruse Ellipta [Umeclidinium Bromide] Unspecified     Blisters in mouth and throat   • Naltrexone Swelling     All over body       FAMILY HISTORY  No pertinent family history    PHYSICAL EXAM  VITAL SIGNS: BP (!) 189/135   Pulse (!) 124   Temp 36.5 °C (97.7 °F) (Temporal)   Resp (!) 21   Wt 84 kg (185 lb 3 oz)   SpO2 92%   BMI 30.82 kg/m²  @DANG[[id number]::@   Pulse ox interpretation: I interpret this pulse ox as a corrected hypoxia on oxygen.  Constitutional: Alert, moderate respiratory distress on oxygen.  HENT: No signs of trauma, Bilateral external ears normal, Nose normal.   Eyes: Pupils are equal and reactive, Conjunctiva normal, Non-icteric.   Neck: Normal range of motion, No tenderness, Supple, No stridor.   Lymphatic: No lymphadenopathy noted.   Cardiovascular: Regular rate and rhythm, no murmurs.   Thorax & Lungs: Crackles in both bases, moderate respiratory distress on oxygen.  Abdomen: Bowel sounds normal, Soft, No tenderness, No masses, No pulsatile masses. No peritoneal signs.  Skin: Warm, Dry, No erythema, No rash.   Extremities: Intact distal pulses, moderate bilateral edema, No tenderness, No cyanosis.  Musculoskeletal: Good range of motion in all major joints. No tenderness to palpation or major deformities noted.   Neurologic: Alert , Normal motor function, Normal sensory function, No focal deficits noted.   Psychiatric: Affect normal, Judgment normal, Mood normal.       DIAGNOSTIC STUDIES / PROCEDURES    EKG  This is a  twelve-lead EKG interpretation by myself.  It is sinus tachycardia at a rate of 121, the axis is LAD -25 degrees.  The intervals are normal.  There are nonspecific ST-T wave changes.  My impression of this EKG, sinus tachycardia, does not meet STEMI criteria at this time.    LABS  Labs Reviewed   CBC WITH DIFFERENTIAL - Abnormal; Notable for the following components:       Result Value    MCHC 31.9 (*)     All other components within normal limits   COMP METABOLIC PANEL - Abnormal; Notable for the following components:    Glucose 331 (*)     AST(SGOT) 77 (*)     ALT(SGPT) 83 (*)     Alkaline Phosphatase 158 (*)     Globulin 4.0 (*)     All other components within normal limits   PROBRAIN NATRIURETIC PEPTIDE, NT - Abnormal; Notable for the following components:    NT-proBNP 2049 (*)     All other components within normal limits   TROPONIN - Abnormal; Notable for the following components:    Troponin T 31 (*)     All other components within normal limits   COV-2, FLU A/B, AND RSV BY PCR    Narrative:     Have you been in close contact with a person who is suspected  or known to be positive for COVID-19 within the last 30 days  (e.g. last seen that person < 30 days ago)->Unknown   ESTIMATED GFR   MAGNESIUM   PHOSPHORUS         RADIOLOGY  DX-CHEST-PORTABLE (1 VIEW)   Final Result      1.  Patchy and interstitial airspace opacities bilaterally may represent interstitial edema or pneumonitis. Covid 19 is not excluded.   2.  Cardiomegaly.   3.  Atherosclerotic plaque.              COURSE & MEDICAL DECISION MAKING  Pertinent Labs & Imaging studies reviewed. (See chart for details)    The patient presents clinically with CHF.  Chest x-ray was ordered, labs were drawn.    The patient was given 40 mg IV Lasix, she says she did not take her Lasix this morning.    Covid was ordered, negative test.    The patient was given 4 mg IV morphine for leg pain.    I spoke with the [facility] internal medicine resident who will  assess the patient for hospitalization.  She is in poor condition at this time.        FINAL IMPRESSION  1. Acute on chronic congestive heart failure, unspecified heart failure type (HCC)     2. Hypoxia     3. Methamphetamine abuse (HCC)                Electronically signed by: Juan C Desai M.D., 11/6/2021 12:15 PM

## 2021-11-06 NOTE — H&P
History & Physical Note    Date of Admission: 11/6/2021  Admission Status: Inpatient  [facility] Team: [facility]  Attending: EDIS Blount M.D.   Senior Resident: Dr. Josep Hanley MD  Intern: Dr. Hermelindo Carrasco MD  Contact Number: [phone]    Chief Complaint: leg swelling and shortness of breath    History of Present Illness (HPI): Patient is a 49yo female with PMH of methamphetamine use disorder and HFrEF 30% (10/27/21) that presents with leg swelling and shortness of breath. Patient was recently admitted on 11/03/21 for same symptoms and left AMA on 11/05/21. Patient states that nurse was rude and left due to that. Patient states that since then they have been taking their medication regularly, but despite that has been having worsening of symptoms. States that they have been having considerable swelling in their legs and difficulty breathing. Denies oxygen use at home. Complains of neuropathy of the feet; L>R. States that they smoked meth 2-3 days ago and consumed 3 shots of alcohol this morning. Also endorses an episode of diarrhea this morning without melena or hematochezia.    Review of Systems:   Review of Systems   Constitutional: Negative for chills and fever.   HENT: Negative for ear pain and sore throat.    Eyes: Negative for blurred vision and double vision.   Respiratory: Positive for shortness of breath. Negative for cough.    Cardiovascular: Positive for orthopnea and leg swelling. Negative for chest pain and palpitations.   Gastrointestinal: Negative for abdominal pain, blood in stool, constipation, diarrhea, melena, nausea and vomiting.   Genitourinary: Negative for hematuria and urgency.   Musculoskeletal: Negative for back pain, joint pain and neck pain.   Skin: Negative for itching and rash.   Neurological: Positive for tingling. Negative for dizziness, weakness and headaches.   Psychiatric/Behavioral: Positive for substance abuse.       Past Medical History:   Past Medical  History was reviewed with patient.   has a past medical history of CAD (coronary artery disease), Congestive heart failure (HCC), Hypertension, and Psychiatric disorder. She also has no past medical history of Breast cancer (HCC).    Past Surgical History: Past Surgical History was reviewed with patient.   has a past surgical history that includes other orthopedic surgery and abdominal hysterectomy total.    Medications: Medications have been reviewed with patient.  Prior to Admission Medications   Prescriptions Last Dose Informant Patient Reported? Taking?   Umeclidinium Bromide (INCRUSE ELLIPTA) 62.5 MCG/INH AEROSOL POWDER, BREATH ACTIVATED 11/5/2021 at 1300 Patient Yes Yes   Sig: Inhale 2 Puffs every day.   albuterol 108 (90 Base) MCG/ACT Aero Soln inhalation aerosol 11/3/2021 at UNK Patient Yes No   Sig: Inhale 2 Puffs every 6 hours as needed for Shortness of Breath.   aspirin (ASPIRIN 81) 81 MG EC tablet 11/5/2021 at 1300 Patient Yes No   Sig: Take 1 Tablet by mouth every day.   carvedilol (COREG) 6.25 MG Tab 11/5/2021 at 1300 Patient Yes No   Sig: Take 6.25 mg by mouth every day.   furosemide (LASIX) 20 MG Tab 11/6/2021 at 0200 Patient No No   Sig: Take 1 Tablet by mouth 2 times a day.   ibuprofen (MOTRIN) 200 MG Tab 11/6/2021 at 0800 Patient Yes Yes   Sig: Take 800 mg by mouth every 6 hours as needed for Mild Pain.   levothyroxine (SYNTHROID) 50 MCG Tab 11/5/2021 at 1300 Patient Yes No   Sig: Take 50 mcg by mouth every day.   lisinopril (PRINIVIL) 40 MG tablet 11/5/2021 at 1300 Patient Yes No   Sig: Take 1 Tablet by mouth every day.   metFORMIN (GLUCOPHAGE) 500 MG Tab 11/6/2021 at 0200 Patient Yes No   Sig: Take 1 Tablet by mouth 2 times a day.   potassium chloride SA (K-DUR) 10 MEQ Tab CR 11/5/2021 at 1300 Patient No No   Sig: Take 1 Tablet by mouth every day.   simvastatin (ZOCOR) 40 MG Tab 11/5/2021 at 1300 Patient Yes No   Sig: Take 1 Tablet by mouth every day.   spironolactone (ALDACTONE) 25 MG Tab  11/5/2021 at 1300 Patient No No   Sig: Take 1 Tablet by mouth every day.      Facility-Administered Medications: None        Allergies: Allergies have been reviewed with patient.  Allergies   Allergen Reactions   • Incruse Ellipta [Umeclidinium Bromide] Unspecified     Blisters in mouth and throat   • Naltrexone Swelling     All over body       Family History: Father: CHF. Mother: DM2.  family history is not on file.     Social History:   Tobacco: 92 pack year smoker; active.  Alcohol: Drinks up to 6 drinks regularly; consumed 3 shots this morning.  Recreational drugs (illegal and prescription):  History of crack smoking (6yrs, last used 9yrs ago), IV meth (last used 5yrs ago), and smoking meth (last used 3-4 days ago).   Employment: Never employed.  Activity Level: No restrictions.   Living situation:  Lives with partner in [place].  Recent travel:  Denies.  Primary Care Provider: reviewed Manny Araya M.D.  Other (stressors, spirituality, exposures):  Drug usage and psychiatric history.  Physical Exam:   Vitals:  Temp:  [36.5 °C (97.7 °F)] 36.5 °C (97.7 °F)  Pulse:  [122-127] 122  Resp:  [18-70] 27  BP: (158-197)/(105-135) 163/105  SpO2:  [91 %-92 %] 92 %    Physical Exam  Constitutional:       General: She is not in acute distress.     Appearance: She is obese. She is not diaphoretic.   HENT:      Head: Normocephalic and atraumatic.      Right Ear: External ear normal.      Left Ear: External ear normal.      Nose: No rhinorrhea.      Mouth/Throat:      Mouth: Mucous membranes are moist.      Pharynx: No oropharyngeal exudate or posterior oropharyngeal erythema.      Comments: Poor dentition  Eyes:      General: No scleral icterus.     Extraocular Movements: Extraocular movements intact.      Conjunctiva/sclera: Conjunctivae normal.      Pupils: Pupils are equal, round, and reactive to light.   Cardiovascular:      Rate and Rhythm: Regular rhythm. Tachycardia present.      Pulses: Normal pulses.      Heart  sounds: Normal heart sounds. No murmur heard.   No friction rub. No gallop.    Pulmonary:      Effort: Pulmonary effort is normal. No respiratory distress.      Breath sounds: Wheezing present. No rhonchi or rales.      Comments: Diffuse expiratory wheezing  Abdominal:      General: There is distension.      Palpations: Abdomen is soft. There is no mass.      Tenderness: There is no abdominal tenderness.   Musculoskeletal:         General: No swelling or tenderness. Normal range of motion.      Cervical back: Normal range of motion and neck supple. No rigidity or tenderness.      Right lower leg: No edema.      Left lower leg: No edema.   Lymphadenopathy:      Cervical: No cervical adenopathy.   Skin:     General: Skin is warm and dry.      Coloration: Skin is not jaundiced.   Neurological:      Mental Status: She is alert and oriented to person, place, and time.      Cranial Nerves: No cranial nerve deficit.         Labs:   Recent Labs     11/04/21  0000 11/04/21 2108 11/06/21  1150   WBC 8.6 6.4 6.9   RBC 4.79 4.94 5.14   HEMOGLOBIN 13.3 13.5 14.1   HEMATOCRIT 41.4 42.8 44.2   MCV 86.4 86.6 86.0   MCH 27.8 27.3 27.4   RDW 45.2 45.6 45.5   PLATELETCT 340 307 345   MPV 9.5 9.3 9.4   NEUTSPOLYS 74.80* 51.90 66.70   LYMPHOCYTES 22.70 37.10 23.70   MONOCYTES 1.70 8.70 7.90   EOSINOPHILS 0.00 1.40 1.00   BASOPHILS 0.80 0.60 0.40   RBCMORPHOLO Present  --   --      Recent Labs     11/04/21  0000 11/04/21 2108 11/06/21  1150   SODIUM 137 136 140   POTASSIUM 3.9 3.9 4.0   CHLORIDE 100 100 103   CO2 25 23 26   GLUCOSE 286* 309* 331*   BUN 21 29* 17     Recent Labs     11/04/21  0000 11/04/21 2108 11/06/21  1150   ALBUMIN 3.6 3.3 3.6   TBILIRUBIN 0.3 0.4 0.6   ALKPHOSPHAT 143* 151* 158*   TOTPROTEIN 7.1 7.2 7.6   ALTSGPT 52* 60* 83*   ASTSGOT 57* 67* 77*   CREATININE 1.03 1.42* 0.94         EKG: Per my read, sinus tachycardia.    Imaging:   DX-CHEST-PORTABLE (1 VIEW)   Final Result      1.  Patchy and interstitial  airspace opacities bilaterally may represent interstitial edema or pneumonitis. Covid 19 is not excluded.   2.  Cardiomegaly.   3.  Atherosclerotic plaque.      CT-CHEST (THORAX) W/O    (Results Pending)         Previous Data Review: reviewed    A&P  Patient is a 51yo female with PMH of methamphetamine use disorder and HFrEF 30% (10/27/21) that presents with leg swelling and shortness of breath. Patient was recently admitted on 11/03/21 and left AMA on 11/05/21. Admitted for AHRF 2/2 CHF exacerbation.    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 Patient recently left hospital AMA, was already in CHF exacerbation. Presents for worsening of symptoms. Patient states that they sometimes consume more fluids than their restriction, knowing it will worsen their fluid overload. BNP 2049. Trop 31; likely due to demand ischemia. TTE 10/27 showing 30% EF with moderate MR and TR, and RVSP 55 mmHg. EKG showing sinus tachy w/o ST elevation/depression.    -Start on lasix IV 40mg bid and spironolactone 25mg PO  -Start on coreg 6.25mg bid and lisinopril 20mg daily  -Strict I&Os and daily weights  -Cardiac diet; 1L fluid restriction and 2g salt restriction    Acute respiratory failure with hypoxia (HCC)- (present on admission)  Assessment & Plan  Likely 2/2 volume overload from CHF exacerbation as well as body habitus. CXR shows interstitial edema vs pneumonitis. COVID neg. Does not use oxygen at home. Positive orthopnea.    -Pending CT thorax  -Currently on 3L; wean as tolerated  -RT protocol  -Start diuresis with IV lasix and PO spironolactone    Hypertensive urgency  Assessment & Plan  Presented with BP 180s-190s/120s-130s.    -Started on lisinopril 20 daily and coreg 6.25 bid  -Started on hydralazine 10mg IV PRN for SBP>180; given now dose    Type 2 diabetes mellitus without complications (HCC)-  (present on admission)  Assessment & Plan  Uncontrolled DM2. A1c 10.8%.    -Started on glargine 15u qHS  -Started on lispro 5u tid premeal  -Started on low SSI    Tobacco dependence- (present on admission)  Assessment & Plan  92 pack year smoker.    -Started on nicotine patch 21mg and gum  -Discuss smoking cessation    Thyroid disorder- (present on admission)  Assessment & Plan  -Start on home levothyroxine 50mcg    Alcohol abuse- (present on admission)  Assessment & Plan  Patient drinks regularly, 6 drinks every day or every other day. Drank 3 shots this morning before presenting to ED.    -Watch for alcohol withdrawal, may require CIWA  -Discuss alcohol cessation  -Pending alcohol level     DVT ppx: enoxaparin  Code status: FULL CODE  Dispo: Admitted to telemetry.

## 2021-11-06 NOTE — ED TRIAGE NOTES
Pt to triage .  Chief Complaint   Patient presents with   • Leg Swelling     bilateral leg swelling increasing over last week. pt left [facility] from hospital 2 days ago for same s/s

## 2021-11-06 NOTE — ASSESSMENT & PLAN NOTE
92 pack year smoker.    -Started on nicotine patch 21mg and gum  -Patient refuses patch; says doesn't need  -Discuss smoking cessation

## 2021-11-07 PROBLEM — L30.9 ECZEMA: Status: ACTIVE | Noted: 2021-11-07

## 2021-11-07 LAB
ALBUMIN SERPL BCP-MCNC: 3.4 G/DL (ref 3.2–4.9)
ALBUMIN/GLOB SERPL: 0.9 G/DL
ALP SERPL-CCNC: 143 U/L (ref 30–99)
ALT SERPL-CCNC: 74 U/L (ref 2–50)
ANION GAP SERPL CALC-SCNC: 9 MMOL/L (ref 7–16)
AST SERPL-CCNC: 55 U/L (ref 12–45)
BASOPHILS # BLD AUTO: 0.7 % (ref 0–1.8)
BASOPHILS # BLD: 0.05 K/UL (ref 0–0.12)
BILIRUB SERPL-MCNC: 0.4 MG/DL (ref 0.1–1.5)
BUN SERPL-MCNC: 22 MG/DL (ref 8–22)
CALCIUM SERPL-MCNC: 8.9 MG/DL (ref 8.5–10.5)
CHLORIDE SERPL-SCNC: 97 MMOL/L (ref 96–112)
CO2 SERPL-SCNC: 29 MMOL/L (ref 20–33)
CREAT SERPL-MCNC: 1.01 MG/DL (ref 0.5–1.4)
EOSINOPHIL # BLD AUTO: 0.1 K/UL (ref 0–0.51)
EOSINOPHIL NFR BLD: 1.4 % (ref 0–6.9)
ERYTHROCYTE [DISTWIDTH] IN BLOOD BY AUTOMATED COUNT: 47.4 FL (ref 35.9–50)
GLOBULIN SER CALC-MCNC: 3.7 G/DL (ref 1.9–3.5)
GLUCOSE BLD-MCNC: 128 MG/DL (ref 65–99)
GLUCOSE BLD-MCNC: 192 MG/DL (ref 65–99)
GLUCOSE BLD-MCNC: 233 MG/DL (ref 65–99)
GLUCOSE BLD-MCNC: 235 MG/DL (ref 65–99)
GLUCOSE SERPL-MCNC: 175 MG/DL (ref 65–99)
HCT VFR BLD AUTO: 44.3 % (ref 37–47)
HGB BLD-MCNC: 13 G/DL (ref 12–16)
IMM GRANULOCYTES # BLD AUTO: 0.01 K/UL (ref 0–0.11)
IMM GRANULOCYTES NFR BLD AUTO: 0.1 % (ref 0–0.9)
LYMPHOCYTES # BLD AUTO: 2.71 K/UL (ref 1–4.8)
LYMPHOCYTES NFR BLD: 38.5 % (ref 22–41)
MAGNESIUM SERPL-MCNC: 1.8 MG/DL (ref 1.5–2.5)
MCH RBC QN AUTO: 26.3 PG (ref 27–33)
MCHC RBC AUTO-ENTMCNC: 29.3 G/DL (ref 33.6–35)
MCV RBC AUTO: 89.7 FL (ref 81.4–97.8)
MONOCYTES # BLD AUTO: 0.79 K/UL (ref 0–0.85)
MONOCYTES NFR BLD AUTO: 11.2 % (ref 0–13.4)
MORPHOLOGY BLD-IMP: NORMAL
NEUTROPHILS # BLD AUTO: 3.38 K/UL (ref 2–7.15)
NEUTROPHILS NFR BLD: 48.1 % (ref 44–72)
NRBC # BLD AUTO: 0.02 K/UL
NRBC BLD-RTO: 0.3 /100 WBC
PLATELET # BLD AUTO: 291 K/UL (ref 164–446)
PMV BLD AUTO: 9.6 FL (ref 9–12.9)
POTASSIUM SERPL-SCNC: 4.6 MMOL/L (ref 3.6–5.5)
PROT SERPL-MCNC: 7.1 G/DL (ref 6–8.2)
RBC # BLD AUTO: 4.94 M/UL (ref 4.2–5.4)
SODIUM SERPL-SCNC: 135 MMOL/L (ref 135–145)
WBC # BLD AUTO: 7 K/UL (ref 4.8–10.8)

## 2021-11-07 PROCEDURE — 700102 HCHG RX REV CODE 250 W/ 637 OVERRIDE(OP): Performed by: STUDENT IN AN ORGANIZED HEALTH CARE EDUCATION/TRAINING PROGRAM

## 2021-11-07 PROCEDURE — A9270 NON-COVERED ITEM OR SERVICE: HCPCS | Performed by: HOSPITALIST

## 2021-11-07 PROCEDURE — 700111 HCHG RX REV CODE 636 W/ 250 OVERRIDE (IP): Performed by: STUDENT IN AN ORGANIZED HEALTH CARE EDUCATION/TRAINING PROGRAM

## 2021-11-07 PROCEDURE — A9270 NON-COVERED ITEM OR SERVICE: HCPCS | Performed by: STUDENT IN AN ORGANIZED HEALTH CARE EDUCATION/TRAINING PROGRAM

## 2021-11-07 PROCEDURE — 99406 BEHAV CHNG SMOKING 3-10 MIN: CPT

## 2021-11-07 PROCEDURE — 700102 HCHG RX REV CODE 250 W/ 637 OVERRIDE(OP): Performed by: HOSPITALIST

## 2021-11-07 PROCEDURE — 770020 HCHG ROOM/CARE - TELE (206)

## 2021-11-07 PROCEDURE — 85025 COMPLETE CBC W/AUTO DIFF WBC: CPT

## 2021-11-07 PROCEDURE — 36415 COLL VENOUS BLD VENIPUNCTURE: CPT

## 2021-11-07 PROCEDURE — 80053 COMPREHEN METABOLIC PANEL: CPT

## 2021-11-07 PROCEDURE — 83735 ASSAY OF MAGNESIUM: CPT

## 2021-11-07 PROCEDURE — 99233 SBSQ HOSP IP/OBS HIGH 50: CPT | Mod: GC | Performed by: HOSPITALIST

## 2021-11-07 PROCEDURE — 82962 GLUCOSE BLOOD TEST: CPT

## 2021-11-07 PROCEDURE — 700111 HCHG RX REV CODE 636 W/ 250 OVERRIDE (IP): Performed by: HOSPITALIST

## 2021-11-07 RX ORDER — CARVEDILOL 6.25 MG/1
6.25 TABLET ORAL 2 TIMES DAILY WITH MEALS
Status: DISCONTINUED | OUTPATIENT
Start: 2021-11-07 | End: 2021-11-09 | Stop reason: HOSPADM

## 2021-11-07 RX ORDER — MAGNESIUM SULFATE HEPTAHYDRATE 40 MG/ML
2 INJECTION, SOLUTION INTRAVENOUS ONCE
Status: COMPLETED | OUTPATIENT
Start: 2021-11-07 | End: 2021-11-07

## 2021-11-07 RX ORDER — GABAPENTIN 400 MG/1
400 CAPSULE ORAL 3 TIMES DAILY
COMMUNITY
End: 2021-11-12 | Stop reason: SDUPTHER

## 2021-11-07 RX ORDER — CARVEDILOL 12.5 MG/1
12.5 TABLET ORAL 2 TIMES DAILY WITH MEALS
Status: DISCONTINUED | OUTPATIENT
Start: 2021-11-07 | End: 2021-11-07

## 2021-11-07 RX ORDER — HYDRALAZINE HYDROCHLORIDE 25 MG/1
25 TABLET, FILM COATED ORAL EVERY 8 HOURS
Status: DISCONTINUED | OUTPATIENT
Start: 2021-11-07 | End: 2021-11-08

## 2021-11-07 RX ORDER — ALBUTEROL SULFATE 90 UG/1
2 AEROSOL, METERED RESPIRATORY (INHALATION) EVERY 6 HOURS PRN
Status: DISCONTINUED | OUTPATIENT
Start: 2021-11-07 | End: 2021-11-09 | Stop reason: HOSPADM

## 2021-11-07 RX ORDER — GABAPENTIN 100 MG/1
200 CAPSULE ORAL 3 TIMES DAILY
Status: DISCONTINUED | OUTPATIENT
Start: 2021-11-07 | End: 2021-11-08

## 2021-11-07 RX ORDER — FUROSEMIDE 10 MG/ML
80 INJECTION INTRAMUSCULAR; INTRAVENOUS 2 TIMES DAILY
Status: DISCONTINUED | OUTPATIENT
Start: 2021-11-07 | End: 2021-11-09

## 2021-11-07 RX ADMIN — LISINOPRIL 20 MG: 20 TABLET ORAL at 05:48

## 2021-11-07 RX ADMIN — SIMVASTATIN 40 MG: 40 TABLET, FILM COATED ORAL at 17:54

## 2021-11-07 RX ADMIN — HYDRALAZINE HYDROCHLORIDE 25 MG: 25 TABLET, FILM COATED ORAL at 17:55

## 2021-11-07 RX ADMIN — ACETAMINOPHEN 650 MG: 325 TABLET, FILM COATED ORAL at 01:06

## 2021-11-07 RX ADMIN — MAGNESIUM SULFATE HEPTAHYDRATE 2 G: 40 INJECTION, SOLUTION INTRAVENOUS at 09:17

## 2021-11-07 RX ADMIN — TIOTROPIUM BROMIDE INHALATION SPRAY 5 MCG: 3.12 SPRAY, METERED RESPIRATORY (INHALATION) at 17:55

## 2021-11-07 RX ADMIN — INSULIN LISPRO 5 UNITS: 100 INJECTION, SOLUTION INTRAVENOUS; SUBCUTANEOUS at 17:55

## 2021-11-07 RX ADMIN — INSULIN LISPRO 2 UNITS: 100 INJECTION, SOLUTION INTRAVENOUS; SUBCUTANEOUS at 17:55

## 2021-11-07 RX ADMIN — ACETAMINOPHEN 650 MG: 325 TABLET, FILM COATED ORAL at 09:16

## 2021-11-07 RX ADMIN — NICOTINE TRANSDERMAL SYSTEM 21 MG: 21 PATCH, EXTENDED RELEASE TRANSDERMAL at 05:48

## 2021-11-07 RX ADMIN — HYDRALAZINE HYDROCHLORIDE 25 MG: 25 TABLET, FILM COATED ORAL at 20:10

## 2021-11-07 RX ADMIN — DOCUSATE SODIUM 50 MG AND SENNOSIDES 8.6 MG 2 TABLET: 8.6; 5 TABLET, FILM COATED ORAL at 17:54

## 2021-11-07 RX ADMIN — LEVOTHYROXINE SODIUM 50 MCG: 0.05 TABLET ORAL at 05:48

## 2021-11-07 RX ADMIN — INSULIN LISPRO 1 UNITS: 100 INJECTION, SOLUTION INTRAVENOUS; SUBCUTANEOUS at 12:00

## 2021-11-07 RX ADMIN — DOCUSATE SODIUM 50 MG AND SENNOSIDES 8.6 MG 2 TABLET: 8.6; 5 TABLET, FILM COATED ORAL at 05:48

## 2021-11-07 RX ADMIN — INSULIN GLARGINE 14 UNITS: 100 INJECTION, SOLUTION SUBCUTANEOUS at 17:54

## 2021-11-07 RX ADMIN — INSULIN LISPRO 5 UNITS: 100 INJECTION, SOLUTION INTRAVENOUS; SUBCUTANEOUS at 12:02

## 2021-11-07 RX ADMIN — ENOXAPARIN SODIUM 40 MG: 40 INJECTION SUBCUTANEOUS at 05:48

## 2021-11-07 RX ADMIN — FUROSEMIDE 80 MG: 10 INJECTION, SOLUTION INTRAMUSCULAR; INTRAVENOUS at 14:44

## 2021-11-07 RX ADMIN — INSULIN LISPRO 2 UNITS: 100 INJECTION, SOLUTION INTRAVENOUS; SUBCUTANEOUS at 06:38

## 2021-11-07 RX ADMIN — GABAPENTIN 200 MG: 100 CAPSULE ORAL at 20:12

## 2021-11-07 RX ADMIN — FUROSEMIDE 40 MG: 10 INJECTION, SOLUTION INTRAMUSCULAR; INTRAVENOUS at 05:48

## 2021-11-07 RX ADMIN — INSULIN LISPRO 5 UNITS: 100 INJECTION, SOLUTION INTRAVENOUS; SUBCUTANEOUS at 06:38

## 2021-11-07 RX ADMIN — CARVEDILOL 6.25 MG: 6.25 TABLET, FILM COATED ORAL at 06:37

## 2021-11-07 RX ADMIN — SPIRONOLACTONE 25 MG: 25 TABLET ORAL at 05:48

## 2021-11-07 RX ADMIN — CARVEDILOL 6.25 MG: 6.25 TABLET, FILM COATED ORAL at 17:55

## 2021-11-07 RX ADMIN — GABAPENTIN 200 MG: 100 CAPSULE ORAL at 14:23

## 2021-11-07 RX ADMIN — INSULIN LISPRO 2 UNITS: 100 INJECTION, SOLUTION INTRAVENOUS; SUBCUTANEOUS at 06:37

## 2021-11-07 ASSESSMENT — ENCOUNTER SYMPTOMS
HEADACHES: 0
CHILLS: 0
SORE THROAT: 0
ABDOMINAL PAIN: 0
DIARRHEA: 0
FEVER: 0
TINGLING: 1
VOMITING: 0
NAUSEA: 0
WEAKNESS: 0
NECK PAIN: 0
CONSTIPATION: 0
PALPITATIONS: 0
DIZZINESS: 0
BLOOD IN STOOL: 0
COUGH: 0
DOUBLE VISION: 0
SHORTNESS OF BREATH: 1
BLURRED VISION: 0
BACK PAIN: 0

## 2021-11-07 ASSESSMENT — LIFESTYLE VARIABLES
PACK_YEARS: 92
SUBSTANCE_ABUSE: 1

## 2021-11-07 ASSESSMENT — PAIN DESCRIPTION - PAIN TYPE
TYPE: ACUTE PAIN
TYPE: ACUTE PAIN

## 2021-11-07 ASSESSMENT — PATIENT HEALTH QUESTIONNAIRE - PHQ9
2. FEELING DOWN, DEPRESSED, IRRITABLE, OR HOPELESS: NOT AT ALL
1. LITTLE INTEREST OR PLEASURE IN DOING THINGS: NOT AT ALL
SUM OF ALL RESPONSES TO PHQ9 QUESTIONS 1 AND 2: 0

## 2021-11-07 ASSESSMENT — FIBROSIS 4 INDEX
FIB4 SCORE: 1.08
FIB4 SCORE: 1.08

## 2021-11-07 NOTE — PROGRESS NOTES
Assumed care of pt at 1900. Report received and bedside rounding completed with night RN. Pt is sitting up in bed calm, no SOB, no acute distress noted. AOx4.  Denies N,V. Pt rates pain 0/10  Last BM: PTA  Dressings/Drains: NA  Call light and pt belongings within reach - hourly rounding in place. See flowsheets for further assessment.     Pt is considered a LOW fall risk. edu provided on risk level.   Fall precautions in place,  bed alarm on for impulsivity. - Treaded non slip socks. Bed locked. Communication board updated with POC.
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
## 2021-11-07 NOTE — ASSESSMENT & PLAN NOTE
Patient states they were diagnosed with COPD in April. Patient has radiographic findings of emphysema on CT thorax.    -Continue on home inhalers

## 2021-11-07 NOTE — ASSESSMENT & PLAN NOTE
History of uncontrolled DM2. Previously on gabapentin 400mg tid. L foot pain specifically.    -Start on gabapentin 200mg tid; titrate up as needed  -Decrease gabapentin from 200mg tid to 200mg qHS due to somnolence

## 2021-11-07 NOTE — PROGRESS NOTES
Pt arrived to unit via gurney at 1740. Pt oriented to room, unit, and plan of care. Tele-monitor placed and monitor room notified. All questions answered at this time. Call light within reach; fall precautions in place.

## 2021-11-07 NOTE — RESPIRATORY CARE
"COPD EDUCATION by COPD CLINICAL EDUCATOR  11/7/2021  at  10:58 AM by Berenice Marcial RRT     Patient interviewed by COPD education team.  Patient declined to participate in full program.  A short intervention has been conducted.  A comprehensive packet including information about COPD, types of treatments to manage their disease and safe home Oxygen usage was provided and reviewed with patient at the bedside.  Patient states she is followed by [facility], and has an appointment with them some time in December of this year. Went over the Action Plan and keeping her nebulizer clean to reduce the risk of getting a respiratory infection.  Smoking Cessation Intervention and education completed, 4 minutes spent on smoking cessation education with patient.    Provided smoking cessation packet with \"Tips to Quit\" and brochure for \"Free Smoking Cessation Classes\".     COPD Screen  COPD Risk Screening  Do you have a history of COPD?: Yes  Do you have a Pulmonologist?: Yes ([facility])    COPD Assessment  COPD Clinical Specialists ONLY  COPD Education Initiated: Yes--Short Intervention (Pt claims to see pulmonary at [facility], has and uses spacer, has had PFT (results unavailable), given COPD and Smoking Cessation literature.)  DME Company: [employer]  DME Equipment Type: O2 @ 3 LPM and nebulizer  Physician Follow Up Appointment: 11/12/21  Appt Time: 1400  Physician Name: Manny Araya M.D.  Pulmonary Follow Up Appointment:  (has appt coming up in Dec 2021, unsure of date)  Pulmonologist Name: [facility]  Referrals Initiated: Yes  Pulmonary Rehab: Declined  Smoking Cessation: Yes  $ Smoking Cessation 3-10 Minutes: Symptomatic (4 min)  Smoking Pack Years: 92  Hospice: N/A  Home Health Care: N/A  [facility] Community Outreach: N/A (unavailable)  [facility]: N/A  [facility]: Yes (placed on referral list)  Is this a COPD exacerbation patient?: No  $ Demo/Eval of SVN's, MDI's and Aerosols:  " "(has and uses spacer)  (OP) Pulmonary Function Testing: Yes (results unavailable)    Meds to Beds  Would the patient like to opt in for Bedside Medication Delivery at Discharge?: Yes, interested     MY COPD ACTION PLAN     It is recommended that patients and physicians /healthcare providers complete this action plan together. This plan should be discussed at each physician visit and updated as needed.    The green, yellow and red zones show groups of symptoms of COPD. This list of symptoms is not comprehensive, and you may experience other symptoms. In the \"Actions\" column, your healthcare provider has recommended actions for you to take based on your symptoms.    Patient Name: Mer Arriaza   YOB: 1970   Last Updated on:     Green Zone:  I am doing well today Actions   •  Usual activitiy and exercise level •  Take daily medications   •  Usual amounts of cough and phlegm/mucus •  Use oxygen as prescribed   •  Sleep well at night •  Continue regular exercise/diet plan   •  Appetite is good •  At all times avoid cigarette smoke, inhaled irritants     Daily Medications (these medications are taken every day):   Umeclidinium (Incurse Ellipta) 2 Puffs Once daily     Additional Information:  Rinse mouth after taking    Yellow Zone:  I am having a bad day or a COPD flare Actions   •  More breathless than usual •  Continue daily medications   •  I have less energy for my daily activities •  Use quick relief inhaler as ordered   •  Increased or thicker phlegm/mucus •  Use oxygen as prescribed   •  Using quick relief inhaler/nebulizer more often •  Get plenty of rest   •  Swelling of ankles more than usual •  Use pursed lip breathing   •  More coughing than usual •  At all times avoid cigarette smoke, inhaled irritants   •  I feel like I have a \"chest cold\"   •  Poor sleep and my symptoms woke me up   •  My appetite is not good   •  My medicine is not helping    •  Call provider immediately if " symptoms don’t improve     Continue daily medications, add rescue medications:   Albuterol  Albuterol 2 Puffs  2.5mg via nebulizer Every 4 hours PRN  Every 4 hours PRN       Medications to be used during a flare up, (as Discussed with Provider):           Additional Information:  If really short of breath use the nebulizer.    If nebulizer is unavailable use the spacer with your rescue Albuterol inhaler.    Rinse the nebulizer after each treatment, and clean once a week to keep from getting a lung infection. Follow directions in COPD booklet.    Red Zone:  I need urgent medical care Actions   •  Severe shortness of breath even at rest •  Call 911 or seek medical care immediately   •  Not able to do any activity because of breathing    •  Fever or shaking chills    •  Feeling confused or very drowsy     •  Chest pains    •  Coughing up blood

## 2021-11-07 NOTE — CARE PLAN
The patient is Watcher - Medium risk of patient condition declining or worsening    Shift Goals  Clinical Goals: continue diuretics; remain free from withdrawal sx  Patient Goals: rest; comfort    Progress made toward(s) clinical / shift goals:  Pt remains free from pain. Understands need to use call light when getting up. Will continue to monitory.      Problem: Pain - Standard  Goal: Alleviation of pain or a reduction in pain to the patient’s comfort goal  Outcome: Progressing     Problem: Knowledge Deficit - Standard  Goal: Patient and family/care givers will demonstrate understanding of plan of care, disease process/condition, diagnostic tests and medications  Outcome: Progressing       Patient is not progressing towards the following goals:

## 2021-11-08 ENCOUNTER — PATIENT OUTREACH (OUTPATIENT)
Dept: HEALTH INFORMATION MANAGEMENT | Facility: OTHER | Age: 51
End: 2021-11-08

## 2021-11-08 LAB
ALBUMIN SERPL BCP-MCNC: 3.1 G/DL (ref 3.2–4.9)
ALBUMIN/GLOB SERPL: 0.8 G/DL
ALP SERPL-CCNC: 130 U/L (ref 30–99)
ALT SERPL-CCNC: 58 U/L (ref 2–50)
ANION GAP SERPL CALC-SCNC: 7 MMOL/L (ref 7–16)
AST SERPL-CCNC: 39 U/L (ref 12–45)
BASOPHILS # BLD AUTO: 0.4 % (ref 0–1.8)
BASOPHILS # BLD: 0.03 K/UL (ref 0–0.12)
BILIRUB SERPL-MCNC: 0.5 MG/DL (ref 0.1–1.5)
BUN SERPL-MCNC: 29 MG/DL (ref 8–22)
CALCIUM SERPL-MCNC: 8.6 MG/DL (ref 8.5–10.5)
CHLORIDE SERPL-SCNC: 96 MMOL/L (ref 96–112)
CO2 SERPL-SCNC: 31 MMOL/L (ref 20–33)
CREAT SERPL-MCNC: 1.35 MG/DL (ref 0.5–1.4)
EOSINOPHIL # BLD AUTO: 0.08 K/UL (ref 0–0.51)
EOSINOPHIL NFR BLD: 1.1 % (ref 0–6.9)
ERYTHROCYTE [DISTWIDTH] IN BLOOD BY AUTOMATED COUNT: 46.7 FL (ref 35.9–50)
GLOBULIN SER CALC-MCNC: 4 G/DL (ref 1.9–3.5)
GLUCOSE BLD-MCNC: 151 MG/DL (ref 65–99)
GLUCOSE BLD-MCNC: 223 MG/DL (ref 65–99)
GLUCOSE BLD-MCNC: 230 MG/DL (ref 65–99)
GLUCOSE BLD-MCNC: 257 MG/DL (ref 65–99)
GLUCOSE BLD-MCNC: 280 MG/DL (ref 65–99)
GLUCOSE SERPL-MCNC: 209 MG/DL (ref 65–99)
HCT VFR BLD AUTO: 44.6 % (ref 37–47)
HGB BLD-MCNC: 13.8 G/DL (ref 12–16)
IMM GRANULOCYTES # BLD AUTO: 0.02 K/UL (ref 0–0.11)
IMM GRANULOCYTES NFR BLD AUTO: 0.3 % (ref 0–0.9)
LYMPHOCYTES # BLD AUTO: 1.69 K/UL (ref 1–4.8)
LYMPHOCYTES NFR BLD: 22.8 % (ref 22–41)
MAGNESIUM SERPL-MCNC: 2.1 MG/DL (ref 1.5–2.5)
MCH RBC QN AUTO: 27 PG (ref 27–33)
MCHC RBC AUTO-ENTMCNC: 30.9 G/DL (ref 33.6–35)
MCV RBC AUTO: 87.1 FL (ref 81.4–97.8)
MONOCYTES # BLD AUTO: 0.61 K/UL (ref 0–0.85)
MONOCYTES NFR BLD AUTO: 8.2 % (ref 0–13.4)
NEUTROPHILS # BLD AUTO: 4.99 K/UL (ref 2–7.15)
NEUTROPHILS NFR BLD: 67.2 % (ref 44–72)
NRBC # BLD AUTO: 0 K/UL
NRBC BLD-RTO: 0 /100 WBC
PLATELET # BLD AUTO: 326 K/UL (ref 164–446)
PMV BLD AUTO: 9.3 FL (ref 9–12.9)
POTASSIUM SERPL-SCNC: 4.2 MMOL/L (ref 3.6–5.5)
PROT SERPL-MCNC: 7.1 G/DL (ref 6–8.2)
RBC # BLD AUTO: 5.12 M/UL (ref 4.2–5.4)
SODIUM SERPL-SCNC: 134 MMOL/L (ref 135–145)
TSH SERPL DL<=0.005 MIU/L-ACNC: 0.46 UIU/ML (ref 0.38–5.33)
WBC # BLD AUTO: 7.4 K/UL (ref 4.8–10.8)

## 2021-11-08 PROCEDURE — 36415 COLL VENOUS BLD VENIPUNCTURE: CPT

## 2021-11-08 PROCEDURE — 700111 HCHG RX REV CODE 636 W/ 250 OVERRIDE (IP): Performed by: STUDENT IN AN ORGANIZED HEALTH CARE EDUCATION/TRAINING PROGRAM

## 2021-11-08 PROCEDURE — 80053 COMPREHEN METABOLIC PANEL: CPT

## 2021-11-08 PROCEDURE — 84443 ASSAY THYROID STIM HORMONE: CPT

## 2021-11-08 PROCEDURE — 99232 SBSQ HOSP IP/OBS MODERATE 35: CPT | Mod: GC | Performed by: HOSPITALIST

## 2021-11-08 PROCEDURE — 85025 COMPLETE CBC W/AUTO DIFF WBC: CPT

## 2021-11-08 PROCEDURE — 770020 HCHG ROOM/CARE - TELE (206)

## 2021-11-08 PROCEDURE — 83735 ASSAY OF MAGNESIUM: CPT

## 2021-11-08 PROCEDURE — 700102 HCHG RX REV CODE 250 W/ 637 OVERRIDE(OP): Performed by: STUDENT IN AN ORGANIZED HEALTH CARE EDUCATION/TRAINING PROGRAM

## 2021-11-08 PROCEDURE — A9270 NON-COVERED ITEM OR SERVICE: HCPCS | Performed by: HOSPITALIST

## 2021-11-08 PROCEDURE — A9270 NON-COVERED ITEM OR SERVICE: HCPCS | Performed by: STUDENT IN AN ORGANIZED HEALTH CARE EDUCATION/TRAINING PROGRAM

## 2021-11-08 PROCEDURE — 82962 GLUCOSE BLOOD TEST: CPT | Mod: 91

## 2021-11-08 PROCEDURE — 700102 HCHG RX REV CODE 250 W/ 637 OVERRIDE(OP): Performed by: HOSPITALIST

## 2021-11-08 PROCEDURE — 700111 HCHG RX REV CODE 636 W/ 250 OVERRIDE (IP): Performed by: HOSPITALIST

## 2021-11-08 RX ORDER — HYDRALAZINE HYDROCHLORIDE 25 MG/1
25 TABLET, FILM COATED ORAL ONCE
Status: DISCONTINUED | OUTPATIENT
Start: 2021-11-08 | End: 2021-11-09

## 2021-11-08 RX ORDER — GABAPENTIN 100 MG/1
200 CAPSULE ORAL EVERY EVENING
Status: DISCONTINUED | OUTPATIENT
Start: 2021-11-08 | End: 2021-11-09 | Stop reason: HOSPADM

## 2021-11-08 RX ORDER — LISINOPRIL 5 MG/1
5 TABLET ORAL
Status: DISCONTINUED | OUTPATIENT
Start: 2021-11-09 | End: 2021-11-09

## 2021-11-08 RX ORDER — HYDRALAZINE HYDROCHLORIDE 50 MG/1
50 TABLET, FILM COATED ORAL EVERY 8 HOURS
Status: DISCONTINUED | OUTPATIENT
Start: 2021-11-08 | End: 2021-11-09

## 2021-11-08 RX ORDER — INSULIN LISPRO 100 [IU]/ML
5 INJECTION, SOLUTION INTRAVENOUS; SUBCUTANEOUS
Status: DISCONTINUED | OUTPATIENT
Start: 2021-11-08 | End: 2021-11-09 | Stop reason: HOSPADM

## 2021-11-08 RX ORDER — DEXTROSE MONOHYDRATE 25 G/50ML
50 INJECTION, SOLUTION INTRAVENOUS
Status: DISCONTINUED | OUTPATIENT
Start: 2021-11-08 | End: 2021-11-09 | Stop reason: HOSPADM

## 2021-11-08 RX ORDER — INSULIN LISPRO 100 [IU]/ML
1-6 INJECTION, SOLUTION INTRAVENOUS; SUBCUTANEOUS
Status: DISCONTINUED | OUTPATIENT
Start: 2021-11-08 | End: 2021-11-09 | Stop reason: HOSPADM

## 2021-11-08 RX ADMIN — GABAPENTIN 200 MG: 100 CAPSULE ORAL at 06:00

## 2021-11-08 RX ADMIN — DOCUSATE SODIUM 50 MG AND SENNOSIDES 8.6 MG 2 TABLET: 8.6; 5 TABLET, FILM COATED ORAL at 17:05

## 2021-11-08 RX ADMIN — CARVEDILOL 6.25 MG: 6.25 TABLET, FILM COATED ORAL at 06:00

## 2021-11-08 RX ADMIN — SIMVASTATIN 40 MG: 40 TABLET, FILM COATED ORAL at 17:05

## 2021-11-08 RX ADMIN — GABAPENTIN 200 MG: 100 CAPSULE ORAL at 17:06

## 2021-11-08 RX ADMIN — INSULIN LISPRO 1 UNITS: 100 INJECTION, SOLUTION INTRAVENOUS; SUBCUTANEOUS at 21:06

## 2021-11-08 RX ADMIN — INSULIN LISPRO 2 UNITS: 100 INJECTION, SOLUTION INTRAVENOUS; SUBCUTANEOUS at 11:14

## 2021-11-08 RX ADMIN — LEVOTHYROXINE SODIUM 50 MCG: 0.05 TABLET ORAL at 06:00

## 2021-11-08 RX ADMIN — SPIRONOLACTONE 25 MG: 25 TABLET ORAL at 06:00

## 2021-11-08 RX ADMIN — FUROSEMIDE 80 MG: 10 INJECTION, SOLUTION INTRAMUSCULAR; INTRAVENOUS at 05:59

## 2021-11-08 RX ADMIN — INSULIN LISPRO 5 UNITS: 100 INJECTION, SOLUTION INTRAVENOUS; SUBCUTANEOUS at 06:16

## 2021-11-08 RX ADMIN — INSULIN LISPRO 2 UNITS: 100 INJECTION, SOLUTION INTRAVENOUS; SUBCUTANEOUS at 06:16

## 2021-11-08 RX ADMIN — HYDRALAZINE HYDROCHLORIDE 50 MG: 50 TABLET, FILM COATED ORAL at 17:04

## 2021-11-08 RX ADMIN — INSULIN LISPRO 2 UNITS: 100 INJECTION, SOLUTION INTRAVENOUS; SUBCUTANEOUS at 17:10

## 2021-11-08 RX ADMIN — LISINOPRIL 20 MG: 20 TABLET ORAL at 06:00

## 2021-11-08 RX ADMIN — HYDRALAZINE HYDROCHLORIDE 25 MG: 25 TABLET, FILM COATED ORAL at 06:00

## 2021-11-08 RX ADMIN — ENOXAPARIN SODIUM 40 MG: 40 INJECTION SUBCUTANEOUS at 06:01

## 2021-11-08 RX ADMIN — INSULIN LISPRO 5 UNITS: 100 INJECTION, SOLUTION INTRAVENOUS; SUBCUTANEOUS at 17:06

## 2021-11-08 RX ADMIN — CARVEDILOL 6.25 MG: 6.25 TABLET, FILM COATED ORAL at 17:05

## 2021-11-08 RX ADMIN — INSULIN LISPRO 5 UNITS: 100 INJECTION, SOLUTION INTRAVENOUS; SUBCUTANEOUS at 11:14

## 2021-11-08 ASSESSMENT — ENCOUNTER SYMPTOMS
DIARRHEA: 0
SORE THROAT: 0
NAUSEA: 0
VOMITING: 0
CHILLS: 0
NECK PAIN: 0
CONSTIPATION: 0
DIZZINESS: 0
FEVER: 0
COUGH: 0
TINGLING: 0
HEADACHES: 0
BLOOD IN STOOL: 0
PALPITATIONS: 0
ABDOMINAL PAIN: 0
DOUBLE VISION: 0
SHORTNESS OF BREATH: 0
WEAKNESS: 0
BLURRED VISION: 0
BACK PAIN: 0

## 2021-11-08 ASSESSMENT — PATIENT HEALTH QUESTIONNAIRE - PHQ9
SUM OF ALL RESPONSES TO PHQ9 QUESTIONS 1 AND 2: 0
1. LITTLE INTEREST OR PLEASURE IN DOING THINGS: NOT AT ALL
2. FEELING DOWN, DEPRESSED, IRRITABLE, OR HOPELESS: NOT AT ALL

## 2021-11-08 ASSESSMENT — FIBROSIS 4 INDEX: FIB4 SCORE: 0.77

## 2021-11-08 NOTE — CARE PLAN
The patient is Watcher - Medium risk of patient condition declining or worsening    Shift Goals  Clinical Goals: continue diuretics; remain free from withdrawal sx  Patient Goals: rest; comfort    Progress made toward(s) clinical / shift goals:    Problem: Knowledge Deficit - Standard  Goal: Patient and family/care givers will demonstrate understanding of plan of care, disease process/condition, diagnostic tests and medications  Outcome: Progressing       Patient is not progressing towards the following goals:      Problem: Pain - Standard  Goal: Alleviation of pain or a reduction in pain to the patient’s comfort goal  Outcome: Not Progressing   Pt reports of pain, gabapentin rx has been started to relieve neuropathy sx  Problem: Fall Risk  Goal: Patient will remain free from falls  Outcome: Not Progressing   Pt refusing bed alarm

## 2021-11-08 NOTE — HEART FAILURE PROGRAM
Patient admitted in the setting of having recently been in HF exacerbation but leaving AMA. Unfortunately it looks like patient is still struggling with amphetamine use.    Residence: [place]  Insurance: Medicaid  [facility] Eligible?: yes, referred  Indication on facesheet for Hydralazine Hydrochloride/Isosorbide Dinitrate? no    DM dx? yes  Atrial arrhythmia dx? no  Current smoker? yes    GDMT:    Where we stand right now with HFrEF MEASURES:    • Evidence Based Beta Blocker (bisoprolol, carvedilol, or toprol xl), for EF of 40% or less: carvedilol  • MICHELLE - I, for EF of 40% or less: lisinopril  • Aldosterone antagonist, for EF of 35% or less: spironolactone  • Anticoagulation for atrial arrhythmia: n/a  • Glycemic control for DM + HF: insuling  • Lipid lowering medication for DM + HF: simvastatin  • Hydralazine Hydrochloride/Isosorbide Dinitrate: n/a  • Pneumococcal vaccine: 10/26/21  • Influenza vaccine for current season: 10/26/21  • Smoking cessation counseling documented: discussed in today's note by Dr. Franklin  • Device screening: see below  • HF Education documented: yes  • HF Care Plan documented: added    Thank you, Altagracia Cardio RN Navigator [phone]    HFrEF Specific Device Therapy Screening Tool   Dear Doctor,  Based on the Device Therapy Screen, this patient meets criteria for consideration of cardiac resynchronization therapy (BiV Pacer) and/or implantable cardiac defibrillator (ICD).    Criteria for Cardiac Resynchronization Therapy (must meet all 3)     · EF <35%: yes  · NYHA Class III or ambulatory Class IV: yes  · QRS Duration >120 ms: no, is 86 ms      * CRT is a Class I recommendation for patients with Sinus Rhythm    Criteria for Implantable Cardiac Defibrillator (must meet all 3)     · EF <35%: yes  · > 40 days post MI: n/a  · Post revascularization or non-ischemic dilated CMP > 3 months: unknown       * ICD therapy is a Class I recommendation    Source: [employer]-  SCA Prevention Program Screening Tool  2013 ACC/ AHA Heart Failure Guidelines  Rev date: 12/2014

## 2021-11-08 NOTE — PROGRESS NOTES
Pt remains on strict I&O's. Discussed at length with pt regarding I&O plan and monitoring, pt verbalized understanding. Noted pt did have some of visitors drinks that were at bedside and removed hat from toilet at times, not allowing for accurate measurements

## 2021-11-08 NOTE — PROGRESS NOTES
Pt refusing bed alarm. Educated patient on risk and health consequences of falls, pt continues to refuse. Charge RN Isah notified and spoke with patient.

## 2021-11-08 NOTE — DISCHARGE PLANNING
LSW attempted to meet with pt at bedside to complete assessment. Pt was sleeping and unable to be aroused at this time.    Addendum @140  LSW met with pt at bedside to complete assessment. Pt A&O and able to verify the information on the face sheet. Pt reported she lives with her S/O in a first floor apartment that does not have any steps into it. Pt is independent with all ADLs and IADLs. Pt reported she uses 3L O2 at baseline supplied by [employer]. Pt drives herself. Pt stated her S/O, son, and dtr are all good supports for her.    Pt stated she receives government assistance of about $400/month. Pt reported she smokes meth and last smoked about 7 days ago. Pt stated she has been diagnosed with multiple personality disorder. Pt reported she has been previously admitted to an inpatient psych hospital, however it was more than two years ago.    Pt's dtr will be able to provide transportation home upon DC. Pt also has MTM benefits if no one is able to pick her up.    Care Transition Team Assessment    Information Source  Orientation Level: Oriented X4  Information Given By: Patient  Informant's Name: Mer Arriaza  Who is responsible for making decisions for patient? : Patient    Readmission Evaluation  Is this a readmission?: Yes - unplanned readmission    Elopement Risk  Legal Hold: No  Ambulatory or Self Mobile in Wheelchair: Yes  Disoriented: No  Psychiatric Symptoms: None  History of Wandering: No  Elopement this Admit: No  Vocalizing Wanting to Leave: No  Displays Behaviors, Body Language Wanting to Leave: No-Not at Risk for Elopement  Elopement Risk: Not at Risk for Elopement    Interdisciplinary Discharge Planning  Durable Medical Equipment: Home Oxygen    Discharge Preparedness  What is your plan after discharge?: Home with help  What are your discharge supports?: Child,Partner  Prior Functional Level: Ambulatory,Drives Self,Independent with Activities of Daily Living,Independent with Medication  Management  Difficulity with ADLs: None  Difficulity with IADLs: None    Functional Assesment  Prior Functional Level: Ambulatory,Drives Self,Independent with Activities of Daily Living,Independent with Medication Management    Finances  Financial Barriers to Discharge: No  Prescription Coverage: Yes    Advance Directive  Advance Directive?: None  Advance Directive offered?: AD Booklet refused    Psychological Assessment  History of Substance Abuse: Methamphetamine  History of Psychiatric Problems: Yes  Non-compliant with Treatment: No  Newly Diagnosed Illness: No    Discharge Risks or Barriers  Discharge risks or barriers?: Mental health,Substance abuse  Patient risk factors: Mental health,Substance abuse    Anticipated Discharge Information  Discharge Disposition: Discharged to home/self care (01)

## 2021-11-08 NOTE — CARE PLAN
Problem: Pain - Standard  Goal: Alleviation of pain or a reduction in pain to the patient’s comfort goal  Outcome: Progressing  Note: Gabapentin added for pain management     Problem: Knowledge Deficit - Standard  Goal: Patient and family/care givers will demonstrate understanding of plan of care, disease process/condition, diagnostic tests and medications  Outcome: Progressing  Note: Education provided regarding disease process   The patient is Watcher - Medium risk of patient condition declining or worsening    Shift Goals  Clinical Goals: continue diuretics; remain free from withdrawal sx  Patient Goals: rest; comfort    Progress made toward(s) clinical / shift goals:  Pt voiding well    Patient is not progressing towards the following goals:

## 2021-11-08 NOTE — PROGRESS NOTES
Began care at 1845, Report received from Mini BARRIOS. Patient in SR/ST on the monitor. Plan of care updated with the patient, no questions at this time. Initial assessment completed, orders reviewed, call light within reach, and fall precautions are in place. Pt appears to be more lethargic then in the morning per the dayshift RN. Pt is still A&Ox4 and participates in conversation. This RN has updated MD Bailey of this.
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
## 2021-11-09 VITALS
SYSTOLIC BLOOD PRESSURE: 123 MMHG | RESPIRATION RATE: 18 BRPM | OXYGEN SATURATION: 92 % | DIASTOLIC BLOOD PRESSURE: 79 MMHG | BODY MASS INDEX: 30.01 KG/M2 | WEIGHT: 180.34 LBS | TEMPERATURE: 98 F | HEART RATE: 111 BPM

## 2021-11-09 LAB
ANION GAP SERPL CALC-SCNC: 6 MMOL/L (ref 7–16)
BUN SERPL-MCNC: 20 MG/DL (ref 8–22)
CALCIUM SERPL-MCNC: 8.6 MG/DL (ref 8.5–10.5)
CHLORIDE SERPL-SCNC: 95 MMOL/L (ref 96–112)
CO2 SERPL-SCNC: 33 MMOL/L (ref 20–33)
CREAT SERPL-MCNC: 0.8 MG/DL (ref 0.5–1.4)
GLUCOSE BLD-MCNC: 197 MG/DL (ref 65–99)
GLUCOSE SERPL-MCNC: 208 MG/DL (ref 65–99)
MAGNESIUM SERPL-MCNC: 1.8 MG/DL (ref 1.5–2.5)
POTASSIUM SERPL-SCNC: 4.1 MMOL/L (ref 3.6–5.5)
SODIUM SERPL-SCNC: 134 MMOL/L (ref 135–145)

## 2021-11-09 PROCEDURE — 99238 HOSP IP/OBS DSCHRG MGMT 30/<: CPT | Mod: GC | Performed by: HOSPITALIST

## 2021-11-09 PROCEDURE — A9270 NON-COVERED ITEM OR SERVICE: HCPCS | Performed by: STUDENT IN AN ORGANIZED HEALTH CARE EDUCATION/TRAINING PROGRAM

## 2021-11-09 PROCEDURE — 700102 HCHG RX REV CODE 250 W/ 637 OVERRIDE(OP): Performed by: STUDENT IN AN ORGANIZED HEALTH CARE EDUCATION/TRAINING PROGRAM

## 2021-11-09 PROCEDURE — 700111 HCHG RX REV CODE 636 W/ 250 OVERRIDE (IP): Performed by: STUDENT IN AN ORGANIZED HEALTH CARE EDUCATION/TRAINING PROGRAM

## 2021-11-09 PROCEDURE — 80048 BASIC METABOLIC PNL TOTAL CA: CPT

## 2021-11-09 PROCEDURE — 83735 ASSAY OF MAGNESIUM: CPT

## 2021-11-09 PROCEDURE — A9270 NON-COVERED ITEM OR SERVICE: HCPCS | Performed by: HOSPITALIST

## 2021-11-09 PROCEDURE — 82962 GLUCOSE BLOOD TEST: CPT

## 2021-11-09 PROCEDURE — 36415 COLL VENOUS BLD VENIPUNCTURE: CPT

## 2021-11-09 PROCEDURE — 700102 HCHG RX REV CODE 250 W/ 637 OVERRIDE(OP): Performed by: HOSPITALIST

## 2021-11-09 RX ORDER — HYDRALAZINE HYDROCHLORIDE 25 MG/1
25 TABLET, FILM COATED ORAL EVERY 8 HOURS
Status: DISCONTINUED | OUTPATIENT
Start: 2021-11-09 | End: 2021-11-09

## 2021-11-09 RX ORDER — LISINOPRIL 20 MG/1
20 TABLET ORAL
Status: DISCONTINUED | OUTPATIENT
Start: 2021-11-10 | End: 2021-11-09 | Stop reason: HOSPADM

## 2021-11-09 RX ORDER — CARVEDILOL 6.25 MG/1
6.25 TABLET ORAL ONCE
Status: COMPLETED | OUTPATIENT
Start: 2021-11-09 | End: 2021-11-09

## 2021-11-09 RX ORDER — HYDRALAZINE HYDROCHLORIDE 50 MG/1
50 TABLET, FILM COATED ORAL EVERY 8 HOURS
Status: DISCONTINUED | OUTPATIENT
Start: 2021-11-09 | End: 2021-11-09 | Stop reason: HOSPADM

## 2021-11-09 RX ORDER — SPIRONOLACTONE 25 MG/1
12.5 TABLET ORAL DAILY
Status: DISCONTINUED | OUTPATIENT
Start: 2021-11-10 | End: 2021-11-09 | Stop reason: HOSPADM

## 2021-11-09 RX ORDER — FUROSEMIDE 10 MG/ML
40 INJECTION INTRAMUSCULAR; INTRAVENOUS ONCE
Status: COMPLETED | OUTPATIENT
Start: 2021-11-09 | End: 2021-11-09

## 2021-11-09 RX ADMIN — Medication 400 MG: at 06:55

## 2021-11-09 RX ADMIN — INSULIN LISPRO 5 UNITS: 100 INJECTION, SOLUTION INTRAVENOUS; SUBCUTANEOUS at 06:26

## 2021-11-09 RX ADMIN — LISINOPRIL 5 MG: 5 TABLET ORAL at 05:14

## 2021-11-09 RX ADMIN — TIOTROPIUM BROMIDE INHALATION SPRAY 5 MCG: 3.12 SPRAY, METERED RESPIRATORY (INHALATION) at 09:14

## 2021-11-09 RX ADMIN — ENOXAPARIN SODIUM 40 MG: 40 INJECTION SUBCUTANEOUS at 05:15

## 2021-11-09 RX ADMIN — HYDRALAZINE HYDROCHLORIDE 50 MG: 50 TABLET, FILM COATED ORAL at 00:46

## 2021-11-09 RX ADMIN — CARVEDILOL 6.25 MG: 6.25 TABLET, FILM COATED ORAL at 09:11

## 2021-11-09 RX ADMIN — DOCUSATE SODIUM 50 MG AND SENNOSIDES 8.6 MG 2 TABLET: 8.6; 5 TABLET, FILM COATED ORAL at 05:14

## 2021-11-09 RX ADMIN — LEVOTHYROXINE SODIUM 50 MCG: 0.05 TABLET ORAL at 05:15

## 2021-11-09 RX ADMIN — FUROSEMIDE 40 MG: 10 INJECTION, SOLUTION INTRAMUSCULAR; INTRAVENOUS at 09:11

## 2021-11-09 RX ADMIN — INSULIN LISPRO 1 UNITS: 100 INJECTION, SOLUTION INTRAVENOUS; SUBCUTANEOUS at 06:22

## 2021-11-09 RX ADMIN — CARVEDILOL 6.25 MG: 6.25 TABLET, FILM COATED ORAL at 06:55

## 2021-11-09 NOTE — NON-PROVIDER
Daily Progress Note:     Date of Service: 11/9/2021  Primary Team: [facility] Team   Attending: No att. providers found   Senior Resident: Dr. Pimentel  Intern: Dr. Brady Odell  Contact:  [phone]    Chief Complaint:   Worsening leg swelling and SOB    Subjective:  50 year old female,with history of methamphetamine abuse, HFrEF 30% (10/27/2021), DMII, mood disorder, neuropathy, and HTN, presents with worsening bilateral lower leg swelling and SOB that started 11/3. She was admitted 11/3 for the same complaints but left AMA 11/5. She is an unreliable historian and it is unclear if she is compliant with her prescribed medications. She states that her last methamphetamine use was 5 days ago but per nursing and physician interview there is suspicion that patient's partner has been providing methamphetamines or other recreational drug to the patient while admitted. She notes that she drinks alcohol regularly and her last drink was a 5 days ago. She has a 92 pack year smoking history.     Patient denies any acute events overnight. Per nursing notes she has not been compliant with fall precautions or fluid restriction. She is currently on 5-6 liters of oxygen, it is uncertain if patient uses oxygen at home. She was updated on plan and was counseled on urgent need for cessation of drug abuse. She has no complaints at this time.       Review of Systems:    Constitutional: negative for fever or chills  HENT: negative for ear pain and sore throat  Eyes: negative for blurred or double vision  Respiratory: negative for SOB or cough  Cardiovascular: negative for chest pain, palpitations, calf pain, or leg swelling  GI: negative for abdominal pain, nausea, or vomiting  : negative for hematuria and urgency  Musculoskeletal: negative for back pain, joint pain, neck pain  Skin: negative for rash  Neurological: negative for dizziness, tingling, and headaches    Objective Data:   Physical Exam:   Vitals:    Temp:  [36.5 °C (97.7 °F)-36.9 °C (98.5 °F)] 36.7 °C (98 °F)  Pulse:  [108-122] 111  Resp:  [17-18] 18  BP: (114-162)/() 123/79  SpO2:  [92 %-96 %] 92 %  Constitutional: NAD, somnolent, obese   HENT; no rhinorrhea, mucous membranes are dry, no oropharyngeal erythema or exudate, poor dentition. EOMI, no scleral icterus, pupils are PEERL. Nasal cannula in place.  Cardio: tachy, regular rhythm, 2+ DP, PT, and radial pulses bilaterally. No murmurs, rubs, or gallops. Mild JVD <45 degrees elevation.   Pulmonary: no acute respiratory distress, normal breath sounds, CTAB no wheezing, rhonchi, or rales  Abdominal: soft, non-tender, no masses, no HSM  Musculoskeletal: no swelling or tenderness, normal ROM, no edema  Lymphadenopathy: no cervical adenopathy  Skin: warm, dry, intact. LLE anterior aspect there is scaly, crusted plaque with no surrounding erythema, streaking, or purulent drainage  Neuro: AAOx4, CNII-XII intact        Labs:   POCT glucose device results  Order: [id number]   Status: Final result     Visible to patient: No (inaccessible in [facility])     Next appt: 11/12/2021 at 02:00 PM in [facility] (Manny Araya M.D.)     0 Result Notes    Component Ref Range & Units  6:20 AM   (11/9/21) 1 d ago   (11/8/21) 1 d ago   (11/8/21) 1 d ago   (11/8/21) 1 d ago   (11/8/21) 1 d ago   (11/8/21) 2 d ago   (11/7/21)   Glucose - Accu-Ck 65 - 99 mg/dL 197 High   151 High   280 High   257 High   230 High   223 High   128 High                     ESTIMATED GFR  Order: [id number] - Reflex for Order [id number]   Status: Final result     Visible to patient: No (scheduled for 11/9/2021 11:53 PM)     Next appt: 11/12/2021 at 02:00 PM in [facility] (Manny Araya M.D.)     0 Result Notes    Component Ref Range & Units 12:47 AM 1 d ago 2 d ago 3 d ago 5 d ago 5 d ago 13 d ago   GFR If African American >60 mL/min/1.73 m 2 >60  50 Abnormal   >60  >60  47 Abnormal   >60  >60    GFR If Non African American >60 mL/min/1.73 m 2  >60  42 Abnormal   58 Abnormal   >60  39 Abnormal   57 Abnormal   50 Abnormal     Resulting Agency  M M M M M M M      [facility]  Order: [id number]   Status: Final result     Visible to patient: No (inaccessible in MyChart)     Next appt: 11/12/2021 at 02:00 PM in [facility] (Manny Araya M.D.)     0 Result Notes    Component Ref Range & Units 12:47 AM 1 d ago 2 d ago 3 d ago 2 wk ago   Magnesium 1.5 - 2.5 mg/dL 1.8  2.1  1.8  1.9  1.5    Resulting Agency  M M M M         Basic Metabolic Panel  Order: [id number]   Status: Final result     Visible to patient: No (inaccessible in MyChart)     Next appt: 11/12/2021 at 02:00 PM in [facility] (Manny Araya M.D.)     0 Result Notes     1 HM Topic    Component Ref Range & Units 12:47 AM 1 d ago 2 d ago 3 d ago 5 d ago 5 d ago 13 d ago   Sodium 135 - 145 mmol/L 134 Low   134 Low   135  140  136  137  137    Potassium 3.6 - 5.5 mmol/L 4.1  4.2  4.6  4.0  3.9  3.9  3.9    Chloride 96 - 112 mmol/L 95 Low   96  97  103  100  100  96    Co2 20 - 33 mmol/L 33  31  29  26  23  25  35 High     Glucose 65 - 99 mg/dL 208 High   209 High   175 High   331 High   309 High   286 High   259 High     Bun 8 - 22 mg/dL 20  29 High   22  17  29 High   21  17    Creatinine 0.50 - 1.40 mg/dL 0.80  1.35  1.01  0.94  1.42 High   1.03  1.14    Calcium 8.5 - 10.5 mg/dL 8.6  8.6  8.9  8.7  8.8  8.7  8.4 Low     Anion Gap 7.0 - 16.0 6.0 Low   7.0  9.0  11.0  13.0  12.0  6.0 Low              Imaging:   CT-CHEST (THORAX) W/O  Order: [id number]   Status: Final result     Visible to patient: No (inaccessible in MyChart)     Next appt: 11/12/2021 at 02:00 PM in [facility] (Manny Araya M.D.)     0 Result Notes    Details    Reading Physician Reading Date Result Priority   Anabella Wallace D.O.  [phone] 11/6/2021 Urgent     Narrative & Impression     11/6/2021 4:27 PM     HISTORY/REASON FOR EXAM:  Respiratory illness, nondiagnostic xray. Bilateral leg swelling. History of CHF and  methamphetamine abuse. Hypoxic and requiring oxygen.        TECHNIQUE/EXAM DESCRIPTION:  CT scan of the chest without contrast.     Noncontrast helical scanning of the chest was obtained from the lung apices through the adrenal glands.     Low dose optimization technique was utilized for this CT exam including automated exposure control and adjustment of the mA and/or kV according to patient size.     COMPARISON: 11/6/2021 radiograph     FINDINGS:  Lungs: No nodules or airspace process. There is subsegmental atelectasis in the lingula. No septal thickening     Mediastinum/Aimee: No significant adenopathy.     Pleura: No pleural effusion.     Cardiac: Heart normal in size without pericardial effusion. Coronary artery calcifications are present.     Vascular: No aneurysmal dilation. There is atherosclerotic disease of the aorta and branch vessels..     Soft tissues: Unremarkable.     Bones: No acute or destructive process.     Cholelithiasis.     IMPRESSION:     1.  Cardiomegaly.  2.  There is subsegmental atelectasis in the lingula with no consolidation or findings of pulmonary edema.  3.  Cholelithiasis.        Fleischner Society pulmonary nodule recommendations:  Not Applicable                Exam Ended: 11/06/21  4:39 PM Last Resulted: 11/06/21  5:23 PM             Problem Representation:  50 year old female, with history of meth abuse and HFrEF 30% admitted with leg swelling and SOB since 11/3. Her labs and imaging is concerning for acute on chronic systolic HF exacerbation requiring diuresis. In addition her presentation is concerning for continued meth abuse and deterioration of her condition due to her multiple herson commodities such as poorly controlled DM and HTN.     #acute on chronic clinical systolic HF  -systolic HF likely exacerbated secondary to methamphetamine abuse  -not fluid restriction compliant  -BMP 2049  -Trop 31  -TTE 10/27 shows 30% EF with moderate MR and TR  -EKG showed sinus tachy without  ST elevation or depression   -strict I&O, daily weight, and tele monitoring, continuous pulse oximetry monitoring   -continue laxis and lisinopril   -increase hydralazone     #Respiratory failure with hypoxia   -RT protocol   -wean off 5-6L oxygen as tolerated  -outpatient oxygen for at home use (unclear if patient has used home oxygen in the past and/or currently)    #HTN  -continue coreg 6.25 BID  -introduce Carvedilol PRN >180  -unclear if elevated HTN is baseline or due to possibility of methamphetamine use while inpatient     #neuropathy  -gabapentin 200mg PRN    #DM  -A1c is 10.8% and poorly controlled   -continue on glargine 17u qHS  -continue on lispro 5u TID premeal  -continue on low SSI; 7u over past 24 hrs     #tobacco abuse  - on smoking cessation  -offered nicotine patches     #methaphematine abuse  -counseled on urgent need to stop methamphetamine use  -offer resources through social work, NA, or pharmaceutical (Buprion and naltrexone has showed mixed effectiveness in some studies)  Assessment & plan notes cannot be loaded without a specified hospital service.

## 2021-11-09 NOTE — PROGRESS NOTES
Report received from DENIS Morse. Patient is A & O x 4, 6 L O2 via NC, SR/ST on the monitor, and denies pain at this time. Patient is not retaining information regarding fluid restriction. Patient has been educated on Fall risk and rationale for fall precautions. Bed alarm is on, bed in low position, and call light in reach. Patient will not use the call light and continues to exit bed without assistance.

## 2021-11-09 NOTE — PROGRESS NOTES
Bedside report received. No significant overnight events. Patient A&O x 4. Tachycardic in the 120's currently requiring 6 L via NC per pt baseline is 2-3 L at home, will attempt to titrate back to baseline throughout the day. Lungs diminished throughout. No complaints of pain at this time.  Kidney fxn improved, pt reports PATEL. IV Lasix resumed however Pt is currently refusing IV access as well as bed alarm. POC discussed with patient. Pt verbalizes understanding. Call light and belongings with in reach. Bed locked and in lowest position, alarm and fall precautions in place.

## 2021-11-09 NOTE — PROGRESS NOTES
"Patient refuses bed alarm and will not use call light for assistance to the bathroom.  Education provided for fall precaution rationale as well as education regarding her fall risk.      Patient also refuses fluid restriction. Education provided regarding heart failure and fluid overload. Patient stated, \"If you don't bring me water, I'll just go drink it out of the sink\".     "

## 2021-11-09 NOTE — PROGRESS NOTES
"Pt has been sleeping through morning and difficult to arouse. Pt boyfriend at bedside and had slept in the room staying over after visiting hours. CNA stated she saw pt boyfriend eating pt breakfast while she was sleeping. Night shift RN reported seeing both pt and pt boyfriend very lethergic with heads hanging down throughout the night. Spoke with Unit supervisor Teresa BARRIOS and charge Alaina BARRIOS. Called security up for standby and went to pt room to speak with pt and boyfriend. The boyfriend was informed he has impeding care and he was going to need to leave. Before Supervisor Teresa BARRIOS could explain further pt became very upset and ripped of O2 and stated \"I am leaving too!! You guys are rude!! These nurses are rude and I ma not staying!!\" Both pt and pt boyfriend would listen to any further explanations and continued ot speak over staff.  from R team came into room and spoke with pt and convinced her to stay. Pt boyfriend gathered his belongings and left with security. Placed pt back on O2 6L NC. Pt was sating 72% on RA prior to return to O2  "

## 2021-11-09 NOTE — CARE PLAN
The patient is Stable - Low risk of patient condition declining or worsening    Shift Goals  Clinical Goals: continue diuretics; remain free from withdrawal sx, safety  Patient Goals: rest; comfort    Progress made toward(s) clinical / shift goals:    Problem: Pain - Standard  Goal: Alleviation of pain or a reduction in pain to the patient’s comfort goal  Outcome: Progressing     Problem: Care Map:  Day 3 Optimal Outcome for the Heart Failure Patient  Goal: Day 3:  Optimal Care of the heart failure patient  Outcome: Progressing     Problem: Fall Risk  Goal: Patient will remain free from falls  Outcome: Progressing    All fall precautions are in place for HIGH RISK patient, however patient continues to exit bed without using joshua light.       Patient is not progressing towards the following goals:      Problem: Knowledge Deficit - Standard  Goal: Patient and family/care givers will demonstrate understanding of plan of care, disease process/condition, diagnostic tests and medications  Outcome: Not Progressing   Patient sow not retain information or demonstrate understanding of plan of care nor disease process.

## 2021-11-09 NOTE — DISCHARGE SUMMARY
Discharge Summary    CHIEF COMPLAINT ON ADMISSION  Chief Complaint   Patient presents with   • Leg Swelling     bilateral leg swelling increasing over last week. 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  She was recently admitted on 11/3/2021 for similar symptoms and left AMA on 11/5/2021.  During time of admission she reported recent methamphetamine use 3 days prior.  On admission blood pressure was elevated to the 180s over 130s       Acute on chronic clinical systolic heart failure (HCC)- (present on admission)  Complicated by hypoxic respiratory failure  Likely 2/2 noncompliance in the setting of meth induced cardiomyopathy  TTE 10/27 showing 30% EF with moderate MR and TR, and RVSP 55 mmHg  BNP 2049. Trop 31, chest x-ray with interstitial edema.  Covid negative.  She was continued on Coreg 6.25 mg twice daily however this dose was not increased due to concern for resuming  methamphetamine use  She was diuresed with Lasix 80 IV twice daily and Lasix were discontinued on 11/8/2021 due to elevation in creatinine from 1-1.35.  At this time Aldactone was also held and lisinopril was reduced from 20 mg to 5 mg daily. SHANI resolved on 11/9/2021 and lisinopril 20 mg was resumed patient received Lasix 40 mg IV    Acute hypoxic respiratory failure  Patient was on 5 L supplemental oxygen likely secondary to pulmonary edema from methamphetamine induced cardiomyopathy  Home O2 orders were placed however patient left AMA  She is to continue diuresis with Lasix 40 mg daily with every 2 pound weight gain    Hypertensive urgency  Patient with blood pressure in the 180s over 130s on admission,  likely secondary to recent methamphetamine use  She is initially treated with lisinopril and hydralazine and  restarted on low-dose Coreg 6.25 twice daily  Continue current medications with blood pressure goal under 130/80.  Avoid beta-blockers without alpha blocking activity due to current methamphetamine use  Patient will be continued on hydralazine 25 mg 3 times daily and lisinopril 20, Coreg 6.25 twice daily    Type 2 diabetes complicated by diabetic neuropathy  She was treated with glargine 17 units nightly and lispro sliding scale  Gabapentin as needed    Hypothyroidism  Continued on home levothyroxine 50 mcg daily    COPD with radiographic findings of emphysema on CT  She is continued on home inhalers    Amphetamine use disorder  Alcohol use disorder  Tobacco use disorder  Patient positive for methamphetamine on admission, blood alcohol negative on admission  Patient was counseled on cessation of alcohol and methamphetamine and smoking  She states that she did not need any nicotine replacement therapy during hospitalization.       On 11/7 a behavioral change was noted which gypsy suspicion for possible methamphetamine use in the hospital.  It was suspected that patient's partner may be supplying her with methamphetamines while inpatient.  Patient denied this and was counseled regarding the status of her heart condition.    On 11/9/2021 she was informed that her partner would not be allowed to visit her while inpatient.  At this time she decided to leave [facility] and verbalized understanding of the risk and benefits in doing so.  She stated that she understood if she continued to use methamphetamine she would significantly shorten her life and with an EF of 30% and current use may have less than a year to live.         No notes on file    Discharge Date  11/9/2021    FOLLOW UP ITEMS POST DISCHARGE  Follow-up with cardiology in 1 to 2 weeks  Follow-up with PCP in 1 to 2 weeks, discuss possibility of initiating SGLT2  inhibitor      DISCHARGE DIAGNOSES  Active Problems:    Acute respiratory failure with hypoxia (HCC) POA: Yes    Alcohol abuse POA: Yes    Chronic obstructive pulmonary disease (HCC) POA: Yes    Thyroid disorder POA: Yes    Peripheral neuropathy POA: Yes    Tobacco dependence POA: Yes    Type 2 diabetes mellitus without complications (HCC) POA: Yes    Acute on chronic clinical systolic heart failure (HCC) POA: Yes    Hypertensive urgency POA: Unknown    Eczema POA: Unknown  Resolved Problems:    * No resolved hospital problems. *      FOLLOW UP  Future Appointments   Date Time Provider Department Center   11/12/2021  2:00 PM Manny Araya M.D. [facility]   12/7/2021  1:40 PM Reji Rodrigez M.D. [facility] None     [address] [id number] [phone]  On 11/15/2021  Please arrive at 9am as a walk in appt to recieve follow up care for your heart failure diagnosis. Thank you    Manny Araya M.D.  [address] [facility] [address]  [phone]            MEDICATIONS ON DISCHARGE     Medication List      ASK your doctor about these medications      Instructions   albuterol 108 (90 Base) MCG/ACT Aers inhalation aerosol   Inhale 2 Puffs every 6 hours as needed for Shortness of Breath.  Dose: 2 Puff     Aspirin 81 81 MG EC tablet  Generic drug: aspirin   Take 1 Tablet by mouth every day.  Dose: 1 Tablet     carvedilol 6.25 MG Tabs  Commonly known as: COREG   Take 6.25 mg by mouth every day.  Dose: 6.25 mg     furosemide 20 MG Tabs  Commonly known as: LASIX   Take 1 Tablet by mouth 2 times a day.  Dose: 20 mg     gabapentin 400 MG Caps  Commonly known as: NEURONTIN   Take 400 mg by mouth 3 times a day. Indications: Neuropathic Pain  Dose: 400 mg     ibuprofen 200 MG Tabs  Commonly known as: MOTRIN   Take 800 mg by mouth every 6 hours as needed for Mild Pain.  Dose: 800 mg     Incruse Ellipta 62.5 MCG/INH Aepb  Generic drug: Umeclidinium Bromide   Inhale 2 Puffs  every day.  Dose: 2 Puff     levothyroxine 50 MCG Tabs  Commonly known as: SYNTHROID   Take 50 mcg by mouth every day.  Dose: 50 mcg     lisinopril 40 MG tablet  Commonly known as: PRINIVIL   Take 1 Tablet by mouth every day.  Dose: 1 Tablet     metFORMIN 500 MG Tabs  Commonly known as: GLUCOPHAGE   Take 1 Tablet by mouth 2 times a day.  Dose: 1 Tablet     potassium chloride SA 10 MEQ Tbcr  Commonly known as: K-DUR   Doctor's comments: Dc 20 MEQ/D AND ONLY PRESCRIBE 10 MEQ/D  Take 1 Tablet by mouth every day.  Dose: 10 mEq     simvastatin 40 MG Tabs  Commonly known as: ZOCOR   Take 1 Tablet by mouth every day.  Dose: 1 Tablet     spironolactone 25 MG Tabs  Commonly known as: ALDACTONE   Take 1 Tablet by mouth every day.  Dose: 25 mg            Allergies  Allergies   Allergen Reactions   • Incruse Ellipta [Umeclidinium Bromide] Unspecified     Blisters in mouth and throat   • Naltrexone Swelling     All over body       DIET  No orders of the defined types were placed in this encounter.      ACTIVITY  As tolerated.  Weight bearing as tolerated    CONSULTATIONS  NA    PROCEDURES  NA    LABORATORY  Lab Results   Component Value Date    SODIUM 134 (L) 11/09/2021    POTASSIUM 4.1 11/09/2021    CHLORIDE 95 (L) 11/09/2021    CO2 33 11/09/2021    GLUCOSE 208 (H) 11/09/2021    BUN 20 11/09/2021    CREATININE 0.80 11/09/2021        Lab Results   Component Value Date    WBC 7.4 11/08/2021    HEMOGLOBIN 13.8 11/08/2021    HEMATOCRIT 44.6 11/08/2021    PLATELETCT 326 11/08/2021        Total time of the discharge process exceeds 35 minutes.

## 2021-11-09 NOTE — PROGRESS NOTES
Mer Arriaza patient has chosen to leave the hospital against medical advice. The attending physician has not discharged the patient. Patient is not a risk to himself or others. I have discussed with the patient the following:  Physician has not determined patient is ready for discharge, Risks and consequences of leaving the hospital too soon and Benefit of continued hospitalization.      Discharge against medical advice form has been Patient refused to sign.       Attending physician has been notified.

## 2021-11-12 ENCOUNTER — OFFICE VISIT (OUTPATIENT)
Dept: MEDICAL GROUP | Facility: CLINIC | Age: 51
End: 2021-11-12
Payer: MEDICAID

## 2021-11-12 ENCOUNTER — PHARMACY VISIT (OUTPATIENT)
Dept: PHARMACY | Facility: MEDICAL CENTER | Age: 51
End: 2021-11-12
Payer: COMMERCIAL

## 2021-11-12 VITALS
OXYGEN SATURATION: 90 % | WEIGHT: 175 LBS | RESPIRATION RATE: 22 BRPM | BODY MASS INDEX: 33.04 KG/M2 | DIASTOLIC BLOOD PRESSURE: 102 MMHG | HEART RATE: 121 BPM | HEIGHT: 61 IN | TEMPERATURE: 97.6 F | SYSTOLIC BLOOD PRESSURE: 146 MMHG

## 2021-11-12 DIAGNOSIS — J44.9 COPD MIXED TYPE (HCC): ICD-10-CM

## 2021-11-12 DIAGNOSIS — G62.89 OTHER POLYNEUROPATHY: ICD-10-CM

## 2021-11-12 DIAGNOSIS — E07.9 THYROID DISORDER: ICD-10-CM

## 2021-11-12 DIAGNOSIS — E11.9 TYPE 2 DIABETES MELLITUS WITHOUT COMPLICATION, UNSPECIFIED WHETHER LONG TERM INSULIN USE (HCC): ICD-10-CM

## 2021-11-12 DIAGNOSIS — M79.89 LEFT LEG SWELLING: ICD-10-CM

## 2021-11-12 DIAGNOSIS — J43.9 PULMONARY EMPHYSEMA, UNSPECIFIED EMPHYSEMA TYPE (HCC): ICD-10-CM

## 2021-11-12 DIAGNOSIS — I50.1 LEFT HEART FAILURE (HCC): ICD-10-CM

## 2021-11-12 DIAGNOSIS — F15.20 METHAMPHETAMINE DEPENDENCE (HCC): ICD-10-CM

## 2021-11-12 PROBLEM — F19.10 SUBSTANCE ABUSE (HCC): Status: ACTIVE | Noted: 2021-04-30

## 2021-11-12 PROBLEM — I25.2 HX OF NON-ST ELEVATION MYOCARDIAL INFARCTION (NSTEMI): Status: ACTIVE | Noted: 2021-10-20

## 2021-11-12 PROBLEM — E03.9 HYPOTHYROIDISM: Status: ACTIVE | Noted: 2021-04-30

## 2021-11-12 PROBLEM — J43.8 OTHER EMPHYSEMA (HCC): Status: ACTIVE | Noted: 2021-04-30

## 2021-11-12 PROCEDURE — 99215 OFFICE O/P EST HI 40 MIN: CPT | Mod: GC | Performed by: STUDENT IN AN ORGANIZED HEALTH CARE EDUCATION/TRAINING PROGRAM

## 2021-11-12 PROCEDURE — RXMED WILLOW AMBULATORY MEDICATION CHARGE: Performed by: STUDENT IN AN ORGANIZED HEALTH CARE EDUCATION/TRAINING PROGRAM

## 2021-11-12 RX ORDER — LISINOPRIL 40 MG/1
20 TABLET ORAL DAILY
Qty: 30 TABLET | Refills: 11 | Status: SHIPPED | OUTPATIENT
Start: 2021-11-12

## 2021-11-12 RX ORDER — POTASSIUM CHLORIDE 750 MG/1
10 TABLET, EXTENDED RELEASE ORAL DAILY
Qty: 30 TABLET | Refills: 2 | Status: SHIPPED | OUTPATIENT
Start: 2021-11-12

## 2021-11-12 RX ORDER — ALBUTEROL SULFATE 90 UG/1
2 AEROSOL, METERED RESPIRATORY (INHALATION) EVERY 6 HOURS PRN
Qty: 8.5 G | Refills: 11 | Status: SHIPPED | OUTPATIENT
Start: 2021-11-12

## 2021-11-12 RX ORDER — SIMVASTATIN 40 MG
40 TABLET ORAL DAILY
Qty: 30 TABLET | Refills: 11 | Status: SHIPPED | OUTPATIENT
Start: 2021-11-12

## 2021-11-12 RX ORDER — GABAPENTIN 400 MG/1
400 CAPSULE ORAL 3 TIMES DAILY
Qty: 90 CAPSULE | Refills: 11 | Status: SHIPPED | OUTPATIENT
Start: 2021-11-12

## 2021-11-12 RX ORDER — FUROSEMIDE 20 MG/1
40 TABLET ORAL
Qty: 30 TABLET | Refills: 1 | Status: SHIPPED | OUTPATIENT
Start: 2021-11-12

## 2021-11-12 RX ORDER — CARVEDILOL 6.25 MG/1
6.25 TABLET ORAL 2 TIMES DAILY WITH MEALS
Qty: 60 TABLET | Refills: 11 | Status: SHIPPED | OUTPATIENT
Start: 2021-11-12

## 2021-11-12 RX ORDER — ASPIRIN 81 MG/1
81 TABLET ORAL DAILY
Qty: 100 TABLET | Refills: 3 | Status: SHIPPED | OUTPATIENT
Start: 2021-11-12

## 2021-11-12 RX ORDER — LEVOTHYROXINE SODIUM 0.05 MG/1
50 TABLET ORAL DAILY
Qty: 30 TABLET | Refills: 11 | Status: SHIPPED | OUTPATIENT
Start: 2021-11-12

## 2021-11-12 RX ORDER — SPIRONOLACTONE 25 MG/1
25 TABLET ORAL DAILY
Qty: 30 TABLET | Refills: 11 | Status: SHIPPED | OUTPATIENT
Start: 2021-11-12

## 2021-11-12 ASSESSMENT — FIBROSIS 4 INDEX: FIB4 SCORE: 0.79

## 2021-11-13 NOTE — PROGRESS NOTES
[facility] FAMILY MEDICINE OFFICE VISIT    Date: 11/12/2021    MRN: [id number]  Patient ID: Mer Arriaza    SUBJECTIVE:  Mer Arriaza is a 51 y.o. 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  Patient reports that she is due for refills of all of her medications at this time.  Patient also reports that since discharge, she has not obtained any home oxygen.  Patient reports that she was instructed by her cardiologist at [facility] that she would need home oxygen, however since order was placed for this on December 21 patient has yet to hear back on the status.  Patient was also told that she would have home oxygen prescribed by [facility] during her multiple hospitalizations, however due to leaving AMA multiple times has not heard back from the status of her oxygen.    Patient reports that since her last visit at this office, her leg cramping has improved markedly with the use of oral potassium.  Patient reports that she has ongoing swelling of the left lower extremity, which has been a somewhat chronic issue for her.  Patient reports that she has significant pain in this lower extremity.  She normally takes gabapentin for this issue.  Patient interested in potential other medications for treatment of this issue at this time.    Patient states that with respect to her methamphetamine use, she has been contacted by Medicaid HPN  who is helping her to address this issue.    PMHx/PSHx:  Past Medical History:   Diagnosis Date   • CAD (coronary artery disease)    • Congestive heart failure (HCC)    • Hypertension    • Psychiatric disorder     multiple personality disorder/bipolar     Past Surgical History:   Procedure Laterality Date   • ABDOMINAL HYSTERECTOMY TOTAL      Hysterectomy, Total Abdominal   • OTHER ORTHOPEDIC SURGERY      right       Allergies: Incruse  ellipta [umeclidinium bromide] and Naltrexone    OBJECTIVE:  Vitals:    11/12/21 1401   BP: 146/102   Pulse: (!) 121   Resp: (!) 22   Temp: 36.4 °C (97.6 °F)   SpO2: 90%       Physical Examination:  General: Animated appearing woman in no acute distress, resting on arrival to room  HEENT: Normocephalic, atraumatic, EOMI  Cardiovascular: RRR, no murmurs, gallops, or rubs  Pulmonary: Diminished bilateral breath sounds, no wheezing or rhonchi  Abdominal: Obese abdomen, non-tender to palpation, no guarding, rigidity, or distension  Extremities: Moves all spontaneously, left lower extremity with 1+ pitting edema, left lower extremity extremely tender to palpation  Neurological: Alert and oriented    ASSESSMENT & PLAN:  Mer Arriaza is a 51 y.o. woman with left ventricular systolic heart failure, thyroid disorder, left leg swelling, methamphetamine abuse, type 2 diabetes, polyneuropathy, COPD, and emphysema needing management for all of these issues at this time.    1. Left heart failure (HCC)  furosemide (LASIX) 20 MG Tab    potassium chloride SA (K-DUR) 10 MEQ Tab CR    lisinopril (PRINIVIL) 40 MG tablet    aspirin (ASPIRIN 81) 81 MG EC tablet    carvedilol (COREG) 6.25 MG Tab    spironolactone (ALDACTONE) 25 MG Tab    Basic Metabolic Panel    Referral to Home Oxygen   2. Thyroid disorder  levothyroxine (SYNTHROID) 50 MCG Tab   3. Left leg swelling  US-EXTREMITY VENOUS LOWER UNILAT LEFT   4. Methamphetamine dependence (HCC)     5. Type 2 diabetes mellitus without complication, unspecified whether long term insulin use (HCC)  simvastatin (ZOCOR) 40 MG Tab    metFORMIN (GLUCOPHAGE) 500 MG Tab    lisinopril (PRINIVIL) 40 MG tablet    Basic Metabolic Panel    MICROALBUMIN 24 HR URINE   6. Other polyneuropathy  gabapentin (NEURONTIN) 400 MG Cap   7. COPD mixed type (HCC)  albuterol 108 (90 Base) MCG/ACT Aero Soln inhalation aerosol    Umeclidinium Bromide (INCRUSE ELLIPTA) 62.5 MCG/INH AEROSOL POWDER, BREATH  ACTIVATED    DME Nebulizer    Referral to Home Oxygen   8. Pulmonary emphysema, unspecified emphysema type (HCC)  albuterol 108 (90 Base) MCG/ACT Aero Soln inhalation aerosol    Umeclidinium Bromide (INCRUSE ELLIPTA) 62.5 MCG/INH AEROSOL POWDER, BREATH ACTIVATED    DME Nebulizer    Referral to Home Oxygen       Orders Placed This Encounter   • DME Nebulizer   • US-EXTREMITY VENOUS LOWER UNILAT LEFT   • Basic Metabolic Panel   • MICROALBUMIN 24 HR URINE   • Referral to Home Oxygen   • furosemide (LASIX) 20 MG Tab   • potassium chloride SA (K-DUR) 10 MEQ Tab CR   • simvastatin (ZOCOR) 40 MG Tab   • levothyroxine (SYNTHROID) 50 MCG Tab   • metFORMIN (GLUCOPHAGE) 500 MG Tab   • lisinopril (PRINIVIL) 40 MG tablet   • aspirin (ASPIRIN 81) 81 MG EC tablet   • carvedilol (COREG) 6.25 MG Tab   • albuterol 108 (90 Base) MCG/ACT Aero Soln inhalation aerosol   • spironolactone (ALDACTONE) 25 MG Tab   • gabapentin (NEURONTIN) 400 MG Cap   • Umeclidinium Bromide (INCRUSE ELLIPTA) 62.5 MCG/INH AEROSOL POWDER, BREATH ACTIVATED     #Left heart failure  Patient with left ventricular systolic heart failure, LVEF 30% on most recent echocardiogram during most recent spree of hospitalizations.  Reviewed all recent hospitalization records including laboratory data.  At this time refilled all medications for patient for management of this issue and placed order for basic metabolic panel to check potassium levels.  Placed referral for home oxygen as well given need for this chronically.  Instructed patient to follow-up in the next 1-2 months for recheck of this issue and to ensure that she is being appropriately managed.  Patient verbalized agreement understanding of plan of care.    #Thyroid disorder  Reviewed recent TSH during patient's hospitalizations which revealed appropriate medical regimen at this time for hypothyroidism.  Refilled levothyroxine for patient and sent to pharmacy.    #Left leg swelling  Patient noted to have  unilateral left leg swelling with significant tenderness to palpation.  Does report a chronic history of this, however some concern potentially for DVT given her other risk factors.  At this time will order ultrasound of left lower extremity, though suspect that this issue is likely more chronic venous stasis rather than any other etiology.  Discussed with patient that she would benefit from use of daily, consistent compression stockings and advised her to get these at pharmacy and use regularly.  Further treatment for pain as discussed below (see polyneuropathy).  We will follow up with patient for this issue at next visit and to review all imaging.    #Methamphetamine dependence  Patient noted to have positive amphetamines at recent hospitalization, indicative of ongoing methamphetamine use.  Per hospital notes, some concern that she may have been supplied with methamphetamine during her hospitalization as well.  Advised patient that she should definitely continue to seek any social support from her Medicaid HPN  for this issue, and encouraged patient to continue cessation of methamphetamine use.  Will likely require referral in the near future to a dedicated behavioral specialist who can help her manage this issue for years.  We will follow up with this issue at next visit.    #Type 2 diabetes  Patient's most recent hemoglobin A1c 10.8 on recent hospital admission.  Suspect the patient was not actually taking her Metformin at home, especially in light of patient reported minimal use of medications prior to her hospitalizations.  At this time refilled Metformin 1000 mg p.o. twice daily as well as lisinopril and simvastatin.  Ordered BMP and urine microalbumin for patient and instructed her to take these to laboratory.  Discussed with patient that she will hear back on her results within 1 week of having these obtained and to contact physician if she does not hear back on them.  Will need to perform  diabetic foot examination as well as referral to optometry at next visit for continued management of diabetes.    #Polyneuropathy  Patient with significant reported left lower extremity pain (see above issue for leg swelling).  Does have history of polyneuropathy secondary to both methamphetamine use as well as type 2 diabetes.  At this time refilled gabapentin for patient.  Discussed with patient that she would benefit from use of topical Voltaren gel or lidocaine gel, though these are not presently covered by Medicaid and she will need to obtain these over-the-counter.  Patient verbalized agreement and understanding.    #COPD/emphysema  Reviewed imaging and reports from [facility] which revealed significant COPD and emphysema.  Patient was supposed to be on 3 L/min home O2, however has never had home oxygen established for her.  Does have access to home oxygen via a neighbor who provides her with this.  At this time have referred patient for home oxygen.  Provided patient with referral form and instructed her that she should hear back on this for follow-up in 2 weeks.  We will also refill albuterol and umeclidinium and sent to pharmacy.  Placed DME order for nebulizer as patient reports that hers is currently broken, and she will need this in order to continue treatment for her COPD.  Instructed patient to follow-up within the next 2 months for recheck of todays concerns and to ensure that all of her equipment was properly received.  Patient verbalized agreement and understanding with plan of care.    Manny Araya M.D.  Family Medicine Resident  PGY-3

## 2021-11-17 ENCOUNTER — TELEPHONE (OUTPATIENT)
Dept: MEDICAL GROUP | Facility: CLINIC | Age: 51
End: 2021-11-17

## 2021-11-17 NOTE — TELEPHONE ENCOUNTER
11/17/21 @ 0809 AM  Physician notified by MA that Mer left message for physician yesterday indicating difficulties with medications. Physician attempted to contact her by telephone, however no answer. Left voicemail indicating physician intent to reach patient. Will try to call again later.

## 2021-11-19 NOTE — TELEPHONE ENCOUNTER
Bri called this afternoon and wanted to know if you put to preferred home care  Oxygen and nebulizer order. Thank you

## 2021-11-23 NOTE — TELEPHONE ENCOUNTER
Please notify Mer that I placed a referral to home oxygen during her visit. [facility] should contact an oxygen supplier covered by her insurance provider who in turn will contact her to set this up. Thanks.

## 2021-12-04 PROCEDURE — RXMED WILLOW AMBULATORY MEDICATION CHARGE: Performed by: STUDENT IN AN ORGANIZED HEALTH CARE EDUCATION/TRAINING PROGRAM

## 2021-12-17 ENCOUNTER — PHARMACY VISIT (OUTPATIENT)
Dept: PHARMACY | Facility: MEDICAL CENTER | Age: 51
End: 2021-12-17
Payer: COMMERCIAL

## 2023-07-28 NOTE — TELEPHONE ENCOUNTER
11/18/21 @ 0759 AM  Attempted to reach Mer by telephone again, again with no answer. Will try once more when able.    Discharged